# Patient Record
Sex: MALE | Race: WHITE | NOT HISPANIC OR LATINO | Employment: UNEMPLOYED | URBAN - METROPOLITAN AREA
[De-identification: names, ages, dates, MRNs, and addresses within clinical notes are randomized per-mention and may not be internally consistent; named-entity substitution may affect disease eponyms.]

---

## 2023-01-01 ENCOUNTER — APPOINTMENT (OUTPATIENT)
Dept: CT IMAGING | Facility: HOSPITAL | Age: 63
DRG: 872 | End: 2023-01-01
Payer: COMMERCIAL

## 2023-01-01 ENCOUNTER — HOSPITAL ENCOUNTER (INPATIENT)
Facility: HOSPITAL | Age: 63
LOS: 1 days | DRG: 872 | End: 2023-10-26
Attending: EMERGENCY MEDICINE | Admitting: STUDENT IN AN ORGANIZED HEALTH CARE EDUCATION/TRAINING PROGRAM
Payer: COMMERCIAL

## 2023-01-01 VITALS
TEMPERATURE: 98.4 F | HEART RATE: 132 BPM | WEIGHT: 159.83 LBS | BODY MASS INDEX: 22.88 KG/M2 | RESPIRATION RATE: 16 BRPM | OXYGEN SATURATION: 99 % | HEIGHT: 70 IN | SYSTOLIC BLOOD PRESSURE: 86 MMHG | DIASTOLIC BLOOD PRESSURE: 50 MMHG

## 2023-01-01 DIAGNOSIS — R11.2 NAUSEA AND VOMITING, UNSPECIFIED VOMITING TYPE: ICD-10-CM

## 2023-01-01 DIAGNOSIS — A41.9 SEPSIS WITH ACUTE RENAL FAILURE WITHOUT SEPTIC SHOCK, DUE TO UNSPECIFIED ORGANISM, UNSPECIFIED ACUTE RENAL FAILURE TYPE: Primary | ICD-10-CM

## 2023-01-01 DIAGNOSIS — R65.20 SEPSIS WITH ACUTE RENAL FAILURE WITHOUT SEPTIC SHOCK, DUE TO UNSPECIFIED ORGANISM, UNSPECIFIED ACUTE RENAL FAILURE TYPE: Primary | ICD-10-CM

## 2023-01-01 DIAGNOSIS — K80.81 BILIARY CALCULUS OF OTHER SITE WITH OBSTRUCTION: ICD-10-CM

## 2023-01-01 DIAGNOSIS — E87.5 HYPERKALEMIA: ICD-10-CM

## 2023-01-01 DIAGNOSIS — N17.9 SEPSIS WITH ACUTE RENAL FAILURE WITHOUT SEPTIC SHOCK, DUE TO UNSPECIFIED ORGANISM, UNSPECIFIED ACUTE RENAL FAILURE TYPE: Primary | ICD-10-CM

## 2023-01-01 LAB
ADV 40+41 DNA STL QL NAA+NON-PROBE: NOT DETECTED
ALBUMIN SERPL-MCNC: 3 G/DL (ref 3.5–5.2)
ALBUMIN/GLOB SERPL: 0.9 G/DL
ALP SERPL-CCNC: 1012 U/L (ref 39–117)
ALT SERPL W P-5'-P-CCNC: 66 U/L (ref 1–41)
ANION GAP SERPL CALCULATED.3IONS-SCNC: 33.8 MMOL/L (ref 5–15)
AST SERPL-CCNC: 166 U/L (ref 1–40)
ASTRO TYP 1-8 RNA STL QL NAA+NON-PROBE: NOT DETECTED
B PARAPERT DNA SPEC QL NAA+PROBE: NOT DETECTED
B PERT DNA SPEC QL NAA+PROBE: NOT DETECTED
BASOPHILS # BLD AUTO: 0.15 10*3/MM3 (ref 0–0.2)
BASOPHILS NFR BLD AUTO: 0.6 % (ref 0–1.5)
BILIRUB SERPL-MCNC: 2.1 MG/DL (ref 0–1.2)
BUN SERPL-MCNC: 56 MG/DL (ref 8–23)
BUN/CREAT SERPL: 10.2 (ref 7–25)
C CAYETANENSIS DNA STL QL NAA+NON-PROBE: NOT DETECTED
C COLI+JEJ+UPSA DNA STL QL NAA+NON-PROBE: NOT DETECTED
C PNEUM DNA NPH QL NAA+NON-PROBE: NOT DETECTED
CALCIUM SPEC-SCNC: 9.8 MG/DL (ref 8.6–10.5)
CHLORIDE SERPL-SCNC: 84 MMOL/L (ref 98–107)
CO2 SERPL-SCNC: 16.2 MMOL/L (ref 22–29)
CREAT SERPL-MCNC: 5.51 MG/DL (ref 0.76–1.27)
CRYPTOSP DNA STL QL NAA+NON-PROBE: NOT DETECTED
D-LACTATE SERPL-SCNC: 10.8 MMOL/L (ref 0.5–2)
D-LACTATE SERPL-SCNC: 2.7 MMOL/L (ref 0.5–2)
D-LACTATE SERPL-SCNC: 3.3 MMOL/L (ref 0.5–2)
DEPRECATED RDW RBC AUTO: 51.8 FL (ref 37–54)
DEPRECATED RDW RBC AUTO: 52.5 FL (ref 37–54)
E HISTOLYT DNA STL QL NAA+NON-PROBE: NOT DETECTED
EAEC PAA PLAS AGGR+AATA ST NAA+NON-PRB: NOT DETECTED
EC STX1+STX2 GENES STL QL NAA+NON-PROBE: NOT DETECTED
EGFRCR SERPLBLD CKD-EPI 2021: 11 ML/MIN/1.73
EOSINOPHIL # BLD AUTO: 0.07 10*3/MM3 (ref 0–0.4)
EOSINOPHIL NFR BLD AUTO: 0.3 % (ref 0.3–6.2)
EPEC EAE GENE STL QL NAA+NON-PROBE: NOT DETECTED
ERYTHROCYTE [DISTWIDTH] IN BLOOD BY AUTOMATED COUNT: 16.6 % (ref 12.3–15.4)
ERYTHROCYTE [DISTWIDTH] IN BLOOD BY AUTOMATED COUNT: 17.1 % (ref 12.3–15.4)
ETEC LTA+ST1A+ST1B TOX ST NAA+NON-PROBE: NOT DETECTED
FLUAV SUBTYP SPEC NAA+PROBE: NOT DETECTED
FLUBV RNA ISLT QL NAA+PROBE: NOT DETECTED
G LAMBLIA DNA STL QL NAA+NON-PROBE: NOT DETECTED
GLOBULIN UR ELPH-MCNC: 3.5 GM/DL
GLUCOSE BLDC GLUCOMTR-MCNC: 102 MG/DL (ref 70–130)
GLUCOSE SERPL-MCNC: 101 MG/DL (ref 65–99)
HADV DNA SPEC NAA+PROBE: NOT DETECTED
HCOV 229E RNA SPEC QL NAA+PROBE: NOT DETECTED
HCOV HKU1 RNA SPEC QL NAA+PROBE: NOT DETECTED
HCOV NL63 RNA SPEC QL NAA+PROBE: NOT DETECTED
HCOV OC43 RNA SPEC QL NAA+PROBE: NOT DETECTED
HCT VFR BLD AUTO: 32.6 % (ref 37.5–51)
HCT VFR BLD AUTO: 35.1 % (ref 37.5–51)
HGB BLD-MCNC: 10.2 G/DL (ref 13–17.7)
HGB BLD-MCNC: 10.8 G/DL (ref 13–17.7)
HMPV RNA NPH QL NAA+NON-PROBE: NOT DETECTED
HPIV1 RNA ISLT QL NAA+PROBE: NOT DETECTED
HPIV2 RNA SPEC QL NAA+PROBE: NOT DETECTED
HPIV3 RNA NPH QL NAA+PROBE: NOT DETECTED
HPIV4 P GENE NPH QL NAA+PROBE: NOT DETECTED
IMM GRANULOCYTES # BLD AUTO: 0.97 10*3/MM3 (ref 0–0.05)
IMM GRANULOCYTES NFR BLD AUTO: 4.1 % (ref 0–0.5)
LIPASE SERPL-CCNC: 50 U/L (ref 13–60)
LYMPHOCYTES # BLD AUTO: 2.99 10*3/MM3 (ref 0.7–3.1)
LYMPHOCYTES NFR BLD AUTO: 12.6 % (ref 19.6–45.3)
M PNEUMO IGG SER IA-ACNC: NOT DETECTED
MCH RBC QN AUTO: 26.5 PG (ref 26.6–33)
MCH RBC QN AUTO: 27.3 PG (ref 26.6–33)
MCHC RBC AUTO-ENTMCNC: 30.8 G/DL (ref 31.5–35.7)
MCHC RBC AUTO-ENTMCNC: 31.3 G/DL (ref 31.5–35.7)
MCV RBC AUTO: 86 FL (ref 79–97)
MCV RBC AUTO: 87.2 FL (ref 79–97)
MONOCYTES # BLD AUTO: 1.01 10*3/MM3 (ref 0.1–0.9)
MONOCYTES NFR BLD AUTO: 4.3 % (ref 5–12)
NEUTROPHILS NFR BLD AUTO: 18.51 10*3/MM3 (ref 1.7–7)
NEUTROPHILS NFR BLD AUTO: 78.1 % (ref 42.7–76)
NOROVIRUS GI+II RNA STL QL NAA+NON-PROBE: DETECTED
NRBC BLD AUTO-RTO: 0.6 /100 WBC (ref 0–0.2)
P SHIGELLOIDES DNA STL QL NAA+NON-PROBE: NOT DETECTED
PLATELET # BLD AUTO: 361 10*3/MM3 (ref 140–450)
PLATELET # BLD AUTO: 518 10*3/MM3 (ref 140–450)
PMV BLD AUTO: 10.9 FL (ref 6–12)
PMV BLD AUTO: 9.9 FL (ref 6–12)
POTASSIUM SERPL-SCNC: 5.5 MMOL/L (ref 3.5–5.2)
PROT SERPL-MCNC: 6.5 G/DL (ref 6–8.5)
RBC # BLD AUTO: 3.74 10*6/MM3 (ref 4.14–5.8)
RBC # BLD AUTO: 4.08 10*6/MM3 (ref 4.14–5.8)
RHINOVIRUS RNA SPEC NAA+PROBE: NOT DETECTED
RSV RNA NPH QL NAA+NON-PROBE: NOT DETECTED
RVA RNA STL QL NAA+NON-PROBE: NOT DETECTED
S ENT+BONG DNA STL QL NAA+NON-PROBE: NOT DETECTED
SAPO I+II+IV+V RNA STL QL NAA+NON-PROBE: NOT DETECTED
SARS-COV-2 RNA NPH QL NAA+NON-PROBE: NOT DETECTED
SHIGELLA SP+EIEC IPAH ST NAA+NON-PROBE: NOT DETECTED
SODIUM SERPL-SCNC: 134 MMOL/L (ref 136–145)
V CHOL+PARA+VUL DNA STL QL NAA+NON-PROBE: NOT DETECTED
V CHOLERAE DNA STL QL NAA+NON-PROBE: NOT DETECTED
WBC NRBC COR # BLD: 23.7 10*3/MM3 (ref 3.4–10.8)
WBC NRBC COR # BLD: 24.62 10*3/MM3 (ref 3.4–10.8)
Y ENTEROCOL DNA STL QL NAA+NON-PROBE: NOT DETECTED

## 2023-01-01 PROCEDURE — 25810000003 SODIUM CHLORIDE 0.9 % SOLUTION: Performed by: NURSE PRACTITIONER

## 2023-01-01 PROCEDURE — 83605 ASSAY OF LACTIC ACID: CPT | Performed by: EMERGENCY MEDICINE

## 2023-01-01 PROCEDURE — 94799 UNLISTED PULMONARY SVC/PX: CPT

## 2023-01-01 PROCEDURE — 0202U NFCT DS 22 TRGT SARS-COV-2: CPT | Performed by: EMERGENCY MEDICINE

## 2023-01-01 PROCEDURE — 99285 EMERGENCY DEPT VISIT HI MDM: CPT

## 2023-01-01 PROCEDURE — 25810000003 LACTATED RINGERS PER 1000 ML: Performed by: EMERGENCY MEDICINE

## 2023-01-01 PROCEDURE — 87507 IADNA-DNA/RNA PROBE TQ 12-25: CPT | Performed by: EMERGENCY MEDICINE

## 2023-01-01 PROCEDURE — 5A12012 PERFORMANCE OF CARDIAC OUTPUT, SINGLE, MANUAL: ICD-10-PCS | Performed by: EMERGENCY MEDICINE

## 2023-01-01 PROCEDURE — 87040 BLOOD CULTURE FOR BACTERIA: CPT | Performed by: EMERGENCY MEDICINE

## 2023-01-01 PROCEDURE — 85025 COMPLETE CBC W/AUTO DIFF WBC: CPT | Performed by: EMERGENCY MEDICINE

## 2023-01-01 PROCEDURE — 92950 HEART/LUNG RESUSCITATION CPR: CPT

## 2023-01-01 PROCEDURE — 83690 ASSAY OF LIPASE: CPT | Performed by: EMERGENCY MEDICINE

## 2023-01-01 PROCEDURE — 25010000002 EPINEPHRINE 1 MG/10ML SOLUTION PREFILLED SYRINGE: Performed by: EMERGENCY MEDICINE

## 2023-01-01 PROCEDURE — 85027 COMPLETE CBC AUTOMATED: CPT | Performed by: NURSE PRACTITIONER

## 2023-01-01 PROCEDURE — 25010000002 PIPERACILLIN SOD-TAZOBACTAM PER 1 G: Performed by: EMERGENCY MEDICINE

## 2023-01-01 PROCEDURE — 36415 COLL VENOUS BLD VENIPUNCTURE: CPT | Performed by: EMERGENCY MEDICINE

## 2023-01-01 PROCEDURE — 82948 REAGENT STRIP/BLOOD GLUCOSE: CPT

## 2023-01-01 PROCEDURE — 0BH17EZ INSERTION OF ENDOTRACHEAL AIRWAY INTO TRACHEA, VIA NATURAL OR ARTIFICIAL OPENING: ICD-10-PCS | Performed by: EMERGENCY MEDICINE

## 2023-01-01 PROCEDURE — 74176 CT ABD & PELVIS W/O CONTRAST: CPT

## 2023-01-01 PROCEDURE — 25010000002 ONDANSETRON PER 1 MG: Performed by: EMERGENCY MEDICINE

## 2023-01-01 PROCEDURE — 80053 COMPREHEN METABOLIC PANEL: CPT | Performed by: EMERGENCY MEDICINE

## 2023-01-01 PROCEDURE — 25010000002 AMIODARONE PER 30 MG: Performed by: EMERGENCY MEDICINE

## 2023-01-01 RX ORDER — SODIUM CHLORIDE 0.9 % (FLUSH) 0.9 %
10 SYRINGE (ML) INJECTION AS NEEDED
Status: DISCONTINUED | OUTPATIENT
Start: 2023-01-01 | End: 2023-01-01 | Stop reason: HOSPADM

## 2023-01-01 RX ORDER — SODIUM CHLORIDE 0.9 % (FLUSH) 0.9 %
10 SYRINGE (ML) INJECTION EVERY 12 HOURS SCHEDULED
Status: DISCONTINUED | OUTPATIENT
Start: 2023-01-01 | End: 2023-01-01 | Stop reason: HOSPADM

## 2023-01-01 RX ORDER — SODIUM CHLORIDE 9 MG/ML
40 INJECTION, SOLUTION INTRAVENOUS AS NEEDED
Status: DISCONTINUED | OUTPATIENT
Start: 2023-01-01 | End: 2023-01-01 | Stop reason: HOSPADM

## 2023-01-01 RX ORDER — IBUPROFEN 600 MG/1
1 TABLET ORAL
Status: DISCONTINUED | OUTPATIENT
Start: 2023-01-01 | End: 2023-01-01 | Stop reason: HOSPADM

## 2023-01-01 RX ORDER — ONDANSETRON 4 MG/1
4 TABLET, FILM COATED ORAL EVERY 6 HOURS PRN
Status: DISCONTINUED | OUTPATIENT
Start: 2023-01-01 | End: 2023-01-01 | Stop reason: HOSPADM

## 2023-01-01 RX ORDER — ATORVASTATIN CALCIUM 20 MG/1
20 TABLET, FILM COATED ORAL NIGHTLY
COMMUNITY

## 2023-01-01 RX ORDER — ONDANSETRON 2 MG/ML
4 INJECTION INTRAMUSCULAR; INTRAVENOUS EVERY 6 HOURS PRN
Status: DISCONTINUED | OUTPATIENT
Start: 2023-01-01 | End: 2023-01-01 | Stop reason: HOSPADM

## 2023-01-01 RX ORDER — MIRTAZAPINE 15 MG/1
7.5 TABLET, FILM COATED ORAL NIGHTLY
COMMUNITY

## 2023-01-01 RX ORDER — NICOTINE POLACRILEX 4 MG
15 LOZENGE BUCCAL
Status: DISCONTINUED | OUTPATIENT
Start: 2023-01-01 | End: 2023-01-01 | Stop reason: HOSPADM

## 2023-01-01 RX ORDER — CALCIUM CARBONATE 500 MG/1
2 TABLET, CHEWABLE ORAL 2 TIMES DAILY PRN
Status: DISCONTINUED | OUTPATIENT
Start: 2023-01-01 | End: 2023-01-01 | Stop reason: HOSPADM

## 2023-01-01 RX ORDER — DEXTROAMPHETAMINE SACCHARATE, AMPHETAMINE ASPARTATE, DEXTROAMPHETAMINE SULFATE AND AMPHETAMINE SULFATE 1.25; 1.25; 1.25; 1.25 MG/1; MG/1; MG/1; MG/1
5 TABLET ORAL 3 TIMES DAILY
COMMUNITY

## 2023-01-01 RX ORDER — SODIUM CHLORIDE 9 MG/ML
100 INJECTION, SOLUTION INTRAVENOUS CONTINUOUS
Status: DISCONTINUED | OUTPATIENT
Start: 2023-01-01 | End: 2023-01-01 | Stop reason: HOSPADM

## 2023-01-01 RX ORDER — ONDANSETRON 2 MG/ML
4 INJECTION INTRAMUSCULAR; INTRAVENOUS ONCE
Status: COMPLETED | OUTPATIENT
Start: 2023-01-01 | End: 2023-01-01

## 2023-01-01 RX ORDER — FOLIC ACID 1 MG/1
1 TABLET ORAL DAILY
COMMUNITY

## 2023-01-01 RX ORDER — PREDNISONE 10 MG/1
10 TABLET ORAL DAILY
COMMUNITY

## 2023-01-01 RX ORDER — OMEPRAZOLE 20 MG/1
20 CAPSULE, DELAYED RELEASE ORAL DAILY
COMMUNITY

## 2023-01-01 RX ORDER — DEXTROSE MONOHYDRATE 25 G/50ML
25 INJECTION, SOLUTION INTRAVENOUS
Status: DISCONTINUED | OUTPATIENT
Start: 2023-01-01 | End: 2023-01-01 | Stop reason: HOSPADM

## 2023-01-01 RX ORDER — ACETAMINOPHEN 325 MG/1
325 TABLET ORAL EVERY 6 HOURS PRN
COMMUNITY

## 2023-01-01 RX ORDER — VENLAFAXINE HYDROCHLORIDE 75 MG/1
75 CAPSULE, EXTENDED RELEASE ORAL DAILY
COMMUNITY

## 2023-01-01 RX ORDER — CHOLECALCIFEROL (VITAMIN D3) 125 MCG
5 CAPSULE ORAL NIGHTLY
COMMUNITY

## 2023-01-01 RX ORDER — ONDANSETRON HYDROCHLORIDE 8 MG/1
8 TABLET, FILM COATED ORAL EVERY 8 HOURS PRN
COMMUNITY

## 2023-01-01 RX ORDER — CLONAZEPAM 0.5 MG/1
0.5 TABLET ORAL 2 TIMES DAILY PRN
COMMUNITY

## 2023-01-01 RX ORDER — SODIUM CHLORIDE, SODIUM LACTATE, POTASSIUM CHLORIDE, CALCIUM CHLORIDE 600; 310; 30; 20 MG/100ML; MG/100ML; MG/100ML; MG/100ML
125 INJECTION, SOLUTION INTRAVENOUS CONTINUOUS
Status: DISCONTINUED | OUTPATIENT
Start: 2023-01-01 | End: 2023-01-01 | Stop reason: HOSPADM

## 2023-01-01 RX ORDER — CALCIUM CARBONATE 500 MG/1
2 TABLET, CHEWABLE ORAL EVERY 6 HOURS PRN
COMMUNITY

## 2023-01-01 RX ORDER — ASPIRIN 81 MG/1
81 TABLET ORAL DAILY
COMMUNITY

## 2023-01-01 RX ORDER — BISACODYL 5 MG/1
10 TABLET, DELAYED RELEASE ORAL DAILY PRN
COMMUNITY

## 2023-01-01 RX ORDER — AMIODARONE HYDROCHLORIDE 50 MG/ML
INJECTION, SOLUTION INTRAVENOUS
Status: COMPLETED | OUTPATIENT
Start: 2023-01-01 | End: 2023-01-01

## 2023-01-01 RX ORDER — NITROGLYCERIN 0.4 MG/1
0.4 TABLET SUBLINGUAL
Status: DISCONTINUED | OUTPATIENT
Start: 2023-01-01 | End: 2023-01-01 | Stop reason: HOSPADM

## 2023-01-01 RX ORDER — INSULIN LISPRO 100 [IU]/ML
2-7 INJECTION, SOLUTION INTRAVENOUS; SUBCUTANEOUS
Status: DISCONTINUED | OUTPATIENT
Start: 2023-01-01 | End: 2023-01-01 | Stop reason: HOSPADM

## 2023-01-01 RX ORDER — TORSEMIDE 20 MG/1
20 TABLET ORAL DAILY
COMMUNITY

## 2023-01-01 RX ADMIN — ONDANSETRON 4 MG: 2 INJECTION INTRAMUSCULAR; INTRAVENOUS at 21:47

## 2023-01-01 RX ADMIN — PIPERACILLIN SODIUM AND TAZOBACTAM SODIUM 3.38 G: 3; .375 INJECTION, SOLUTION INTRAVENOUS at 00:35

## 2023-01-01 RX ADMIN — EPINEPHRINE 1 MG: 0.1 INJECTION INTRACARDIAC; INTRAVENOUS at 06:52

## 2023-01-01 RX ADMIN — SODIUM BICARBONATE 50 MEQ: 84 INJECTION, SOLUTION INTRAVENOUS at 06:58

## 2023-01-01 RX ADMIN — AMIODARONE HYDROCHLORIDE 150 MG: 50 INJECTION, SOLUTION INTRAVENOUS at 07:06

## 2023-01-01 RX ADMIN — EPINEPHRINE 1 MG: 0.1 INJECTION INTRACARDIAC; INTRAVENOUS at 07:09

## 2023-01-01 RX ADMIN — EPINEPHRINE 1 MG: 0.1 INJECTION INTRACARDIAC; INTRAVENOUS at 07:00

## 2023-01-01 RX ADMIN — SODIUM CHLORIDE 100 ML/HR: 9 INJECTION, SOLUTION INTRAVENOUS at 04:32

## 2023-01-01 RX ADMIN — Medication 10 ML: at 02:15

## 2023-01-01 RX ADMIN — SODIUM CHLORIDE, POTASSIUM CHLORIDE, SODIUM LACTATE AND CALCIUM CHLORIDE 125 ML/HR: 600; 310; 30; 20 INJECTION, SOLUTION INTRAVENOUS at 21:44

## 2023-01-01 RX ADMIN — EPINEPHRINE 1 MG: 0.1 INJECTION INTRACARDIAC; INTRAVENOUS at 00:52

## 2023-01-01 RX ADMIN — EPINEPHRINE 1 MG: 0.1 INJECTION INTRACARDIAC; INTRAVENOUS at 06:55

## 2023-01-01 RX ADMIN — EPINEPHRINE 1 MG: 0.1 INJECTION INTRACARDIAC; INTRAVENOUS at 07:12

## 2023-10-11 ENCOUNTER — HOSPITAL ENCOUNTER (INPATIENT)
Facility: HOSPITAL | Age: 63
LOS: 9 days | Discharge: NURSING, REHAB OR LONG-TERM CARE | End: 2023-10-20
Attending: STUDENT IN AN ORGANIZED HEALTH CARE EDUCATION/TRAINING PROGRAM | Admitting: INTERNAL MEDICINE
Payer: COMMERCIAL

## 2023-10-11 ENCOUNTER — APPOINTMENT (OUTPATIENT)
Dept: GENERAL RADIOLOGY | Facility: HOSPITAL | Age: 63
End: 2023-10-11
Payer: COMMERCIAL

## 2023-10-11 ENCOUNTER — APPOINTMENT (OUTPATIENT)
Dept: CT IMAGING | Facility: HOSPITAL | Age: 63
End: 2023-10-11
Payer: COMMERCIAL

## 2023-10-11 DIAGNOSIS — R65.21 SEPTIC SHOCK: Primary | ICD-10-CM

## 2023-10-11 DIAGNOSIS — I95.9 HYPOTENSION, UNSPECIFIED HYPOTENSION TYPE: ICD-10-CM

## 2023-10-11 DIAGNOSIS — A41.9 SEPTIC SHOCK: Primary | ICD-10-CM

## 2023-10-11 LAB
ALBUMIN SERPL-MCNC: 2.8 G/DL (ref 3.5–5.2)
ALBUMIN/GLOB SERPL: 1 G/DL
ALP SERPL-CCNC: 547 U/L (ref 39–117)
ALT SERPL W P-5'-P-CCNC: 25 U/L (ref 1–41)
ANION GAP SERPL CALCULATED.3IONS-SCNC: 12.5 MMOL/L (ref 5–15)
AST SERPL-CCNC: 146 U/L (ref 1–40)
B PARAPERT DNA SPEC QL NAA+PROBE: NOT DETECTED
B PERT DNA SPEC QL NAA+PROBE: NOT DETECTED
BACTERIA UR QL AUTO: ABNORMAL /HPF
BASOPHILS # BLD AUTO: 0.07 10*3/MM3 (ref 0–0.2)
BASOPHILS NFR BLD AUTO: 0.4 % (ref 0–1.5)
BILIRUB SERPL-MCNC: 0.9 MG/DL (ref 0–1.2)
BILIRUB UR QL STRIP: NEGATIVE
BUN SERPL-MCNC: 10 MG/DL (ref 8–23)
BUN/CREAT SERPL: 4.3 (ref 7–25)
C PNEUM DNA NPH QL NAA+NON-PROBE: NOT DETECTED
CALCIUM SPEC-SCNC: 8.6 MG/DL (ref 8.6–10.5)
CHLORIDE SERPL-SCNC: 95 MMOL/L (ref 98–107)
CLARITY UR: CLEAR
CO2 SERPL-SCNC: 27.5 MMOL/L (ref 22–29)
COLOR UR: YELLOW
CREAT SERPL-MCNC: 2.3 MG/DL (ref 0.76–1.27)
D-LACTATE SERPL-SCNC: 1.8 MMOL/L (ref 0.5–2)
DEPRECATED RDW RBC AUTO: 47.5 FL (ref 37–54)
EGFRCR SERPLBLD CKD-EPI 2021: 31.3 ML/MIN/1.73
EOSINOPHIL # BLD AUTO: 0.12 10*3/MM3 (ref 0–0.4)
EOSINOPHIL NFR BLD AUTO: 0.6 % (ref 0.3–6.2)
ERYTHROCYTE [DISTWIDTH] IN BLOOD BY AUTOMATED COUNT: 15 % (ref 12.3–15.4)
FLUAV SUBTYP SPEC NAA+PROBE: NOT DETECTED
FLUBV RNA ISLT QL NAA+PROBE: NOT DETECTED
GLOBULIN UR ELPH-MCNC: 2.7 GM/DL
GLUCOSE SERPL-MCNC: 94 MG/DL (ref 65–99)
GLUCOSE UR STRIP-MCNC: NEGATIVE MG/DL
HADV DNA SPEC NAA+PROBE: NOT DETECTED
HCOV 229E RNA SPEC QL NAA+PROBE: NOT DETECTED
HCOV HKU1 RNA SPEC QL NAA+PROBE: NOT DETECTED
HCOV NL63 RNA SPEC QL NAA+PROBE: NOT DETECTED
HCOV OC43 RNA SPEC QL NAA+PROBE: NOT DETECTED
HCT VFR BLD AUTO: 26.2 % (ref 37.5–51)
HGB BLD-MCNC: 8.3 G/DL (ref 13–17.7)
HGB UR QL STRIP.AUTO: NEGATIVE
HMPV RNA NPH QL NAA+NON-PROBE: NOT DETECTED
HPIV1 RNA ISLT QL NAA+PROBE: NOT DETECTED
HPIV2 RNA SPEC QL NAA+PROBE: NOT DETECTED
HPIV3 RNA NPH QL NAA+PROBE: NOT DETECTED
HPIV4 P GENE NPH QL NAA+PROBE: NOT DETECTED
HYALINE CASTS UR QL AUTO: ABNORMAL /LPF
IMM GRANULOCYTES # BLD AUTO: 0.77 10*3/MM3 (ref 0–0.05)
IMM GRANULOCYTES NFR BLD AUTO: 4.1 % (ref 0–0.5)
KETONES UR QL STRIP: NEGATIVE
LEUKOCYTE ESTERASE UR QL STRIP.AUTO: NEGATIVE
LYMPHOCYTES # BLD AUTO: 2.5 10*3/MM3 (ref 0.7–3.1)
LYMPHOCYTES NFR BLD AUTO: 13.4 % (ref 19.6–45.3)
M PNEUMO IGG SER IA-ACNC: NOT DETECTED
MAGNESIUM SERPL-MCNC: 1.3 MG/DL (ref 1.6–2.4)
MCH RBC QN AUTO: 27.2 PG (ref 26.6–33)
MCHC RBC AUTO-ENTMCNC: 31.7 G/DL (ref 31.5–35.7)
MCV RBC AUTO: 85.9 FL (ref 79–97)
MONOCYTES # BLD AUTO: 0.9 10*3/MM3 (ref 0.1–0.9)
MONOCYTES NFR BLD AUTO: 4.8 % (ref 5–12)
NEUTROPHILS NFR BLD AUTO: 14.33 10*3/MM3 (ref 1.7–7)
NEUTROPHILS NFR BLD AUTO: 76.7 % (ref 42.7–76)
NITRITE UR QL STRIP: NEGATIVE
NRBC BLD AUTO-RTO: 0.3 /100 WBC (ref 0–0.2)
PH UR STRIP.AUTO: 7 [PH] (ref 5–8)
PLATELET # BLD AUTO: 379 10*3/MM3 (ref 140–450)
PMV BLD AUTO: 9.8 FL (ref 6–12)
POTASSIUM SERPL-SCNC: 3.1 MMOL/L (ref 3.5–5.2)
PROCALCITONIN SERPL-MCNC: 10.6 NG/ML (ref 0–0.25)
PROT SERPL-MCNC: 5.5 G/DL (ref 6–8.5)
PROT UR QL STRIP: ABNORMAL
RBC # BLD AUTO: 3.05 10*6/MM3 (ref 4.14–5.8)
RBC # UR STRIP: ABNORMAL /HPF
REF LAB TEST METHOD: ABNORMAL
RHINOVIRUS RNA SPEC NAA+PROBE: NOT DETECTED
RSV RNA NPH QL NAA+NON-PROBE: NOT DETECTED
SARS-COV-2 RNA NPH QL NAA+NON-PROBE: NOT DETECTED
SODIUM SERPL-SCNC: 135 MMOL/L (ref 136–145)
SP GR UR STRIP: 1.01 (ref 1–1.03)
SQUAMOUS #/AREA URNS HPF: ABNORMAL /HPF
TROPONIN T SERPL HS-MCNC: 678 NG/L
UROBILINOGEN UR QL STRIP: ABNORMAL
WBC # UR STRIP: ABNORMAL /HPF
WBC NRBC COR # BLD: 18.69 10*3/MM3 (ref 3.4–10.8)

## 2023-10-11 PROCEDURE — 93010 ELECTROCARDIOGRAM REPORT: CPT | Performed by: INTERNAL MEDICINE

## 2023-10-11 PROCEDURE — 70450 CT HEAD/BRAIN W/O DYE: CPT

## 2023-10-11 PROCEDURE — 71045 X-RAY EXAM CHEST 1 VIEW: CPT

## 2023-10-11 PROCEDURE — 83605 ASSAY OF LACTIC ACID: CPT | Performed by: PHYSICIAN ASSISTANT

## 2023-10-11 PROCEDURE — 25010000002 VANCOMYCIN 10 G RECONSTITUTED SOLUTION: Performed by: PHYSICIAN ASSISTANT

## 2023-10-11 PROCEDURE — 85025 COMPLETE CBC W/AUTO DIFF WBC: CPT | Performed by: PHYSICIAN ASSISTANT

## 2023-10-11 PROCEDURE — 81001 URINALYSIS AUTO W/SCOPE: CPT | Performed by: PHYSICIAN ASSISTANT

## 2023-10-11 PROCEDURE — 99285 EMERGENCY DEPT VISIT HI MDM: CPT

## 2023-10-11 PROCEDURE — 84145 PROCALCITONIN (PCT): CPT | Performed by: PHYSICIAN ASSISTANT

## 2023-10-11 PROCEDURE — 25010000002 CEFEPIME PER 500 MG: Performed by: PHYSICIAN ASSISTANT

## 2023-10-11 PROCEDURE — 80053 COMPREHEN METABOLIC PANEL: CPT | Performed by: PHYSICIAN ASSISTANT

## 2023-10-11 PROCEDURE — 25010000002 POTASSIUM CHLORIDE 10 MEQ/100ML SOLUTION: Performed by: PHYSICIAN ASSISTANT

## 2023-10-11 PROCEDURE — 87086 URINE CULTURE/COLONY COUNT: CPT | Performed by: INTERNAL MEDICINE

## 2023-10-11 PROCEDURE — 93005 ELECTROCARDIOGRAM TRACING: CPT | Performed by: PHYSICIAN ASSISTANT

## 2023-10-11 PROCEDURE — 87040 BLOOD CULTURE FOR BACTERIA: CPT | Performed by: PHYSICIAN ASSISTANT

## 2023-10-11 PROCEDURE — 84484 ASSAY OF TROPONIN QUANT: CPT | Performed by: PHYSICIAN ASSISTANT

## 2023-10-11 PROCEDURE — 0202U NFCT DS 22 TRGT SARS-COV-2: CPT | Performed by: PHYSICIAN ASSISTANT

## 2023-10-11 PROCEDURE — 25810000003 SODIUM CHLORIDE 0.9 % SOLUTION: Performed by: PHYSICIAN ASSISTANT

## 2023-10-11 PROCEDURE — 83735 ASSAY OF MAGNESIUM: CPT | Performed by: PHYSICIAN ASSISTANT

## 2023-10-11 PROCEDURE — P9612 CATHETERIZE FOR URINE SPEC: HCPCS

## 2023-10-11 PROCEDURE — 36415 COLL VENOUS BLD VENIPUNCTURE: CPT

## 2023-10-11 RX ORDER — POTASSIUM CHLORIDE 7.45 MG/ML
10 INJECTION INTRAVENOUS ONCE
Status: COMPLETED | OUTPATIENT
Start: 2023-10-11 | End: 2023-10-12

## 2023-10-11 RX ORDER — SODIUM CHLORIDE 0.9 % (FLUSH) 0.9 %
10 SYRINGE (ML) INJECTION AS NEEDED
Status: DISCONTINUED | OUTPATIENT
Start: 2023-10-11 | End: 2023-10-20 | Stop reason: HOSPADM

## 2023-10-11 RX ORDER — NOREPINEPHRINE BITARTRATE 0.03 MG/ML
.02-.3 INJECTION, SOLUTION INTRAVENOUS
Status: DISCONTINUED | OUTPATIENT
Start: 2023-10-11 | End: 2023-10-13

## 2023-10-11 RX ADMIN — CEFEPIME 2000 MG: 2 INJECTION, POWDER, FOR SOLUTION INTRAVENOUS at 22:32

## 2023-10-11 RX ADMIN — POTASSIUM CHLORIDE 10 MEQ: 7.46 INJECTION, SOLUTION INTRAVENOUS at 23:39

## 2023-10-11 RX ADMIN — Medication 0.06 MCG/KG/MIN: at 23:30

## 2023-10-11 RX ADMIN — VANCOMYCIN HYDROCHLORIDE 1750 MG: 10 INJECTION, POWDER, LYOPHILIZED, FOR SOLUTION INTRAVENOUS at 23:18

## 2023-10-11 RX ADMIN — Medication 0.02 MCG/KG/MIN: at 22:58

## 2023-10-11 RX ADMIN — Medication 0.06 MCG/KG/MIN: at 23:17

## 2023-10-12 ENCOUNTER — APPOINTMENT (OUTPATIENT)
Dept: ULTRASOUND IMAGING | Facility: HOSPITAL | Age: 63
End: 2023-10-12
Payer: COMMERCIAL

## 2023-10-12 ENCOUNTER — APPOINTMENT (OUTPATIENT)
Dept: CARDIOLOGY | Facility: HOSPITAL | Age: 63
End: 2023-10-12
Payer: COMMERCIAL

## 2023-10-12 LAB
ALBUMIN SERPL-MCNC: 2.7 G/DL (ref 3.5–5.2)
ALBUMIN/GLOB SERPL: 1 G/DL
ALP SERPL-CCNC: 528 U/L (ref 39–117)
ALT SERPL W P-5'-P-CCNC: 36 U/L (ref 1–41)
AMMONIA BLD-SCNC: 21 UMOL/L (ref 16–60)
ANION GAP SERPL CALCULATED.3IONS-SCNC: 12 MMOL/L (ref 5–15)
AORTIC DIMENSIONLESS INDEX: 1 (DI)
AST SERPL-CCNC: 235 U/L (ref 1–40)
BH CV ECHO MEAS - ACS: 2.07 CM
BH CV ECHO MEAS - AO MAX PG: 8.4 MMHG
BH CV ECHO MEAS - AO MEAN PG: 4 MMHG
BH CV ECHO MEAS - AO ROOT DIAM: 3.9 CM
BH CV ECHO MEAS - AO V2 MAX: 145 CM/SEC
BH CV ECHO MEAS - AO V2 VTI: 26.5 CM
BH CV ECHO MEAS - AVA(I,D): 3.8 CM2
BH CV ECHO MEAS - EDV(CUBED): 79.5 ML
BH CV ECHO MEAS - EDV(MOD-SP2): 116 ML
BH CV ECHO MEAS - EDV(MOD-SP4): 80 ML
BH CV ECHO MEAS - EF(MOD-BP): 62.7 %
BH CV ECHO MEAS - EF(MOD-SP2): 65.5 %
BH CV ECHO MEAS - EF(MOD-SP4): 58.8 %
BH CV ECHO MEAS - ESV(CUBED): 31 ML
BH CV ECHO MEAS - ESV(MOD-SP2): 40 ML
BH CV ECHO MEAS - ESV(MOD-SP4): 33 ML
BH CV ECHO MEAS - FS: 27 %
BH CV ECHO MEAS - IVS/LVPW: 0.92 CM
BH CV ECHO MEAS - IVSD: 1.1 CM
BH CV ECHO MEAS - LAT PEAK E' VEL: 11.9 CM/SEC
BH CV ECHO MEAS - LV MASS(C)D: 173.6 GRAMS
BH CV ECHO MEAS - LV MAX PG: 8.6 MMHG
BH CV ECHO MEAS - LV MEAN PG: 4 MMHG
BH CV ECHO MEAS - LV V1 MAX: 147 CM/SEC
BH CV ECHO MEAS - LV V1 VTI: 27.4 CM
BH CV ECHO MEAS - LVIDD: 4.3 CM
BH CV ECHO MEAS - LVIDS: 3.1 CM
BH CV ECHO MEAS - LVOT AREA: 3.7 CM2
BH CV ECHO MEAS - LVOT DIAM: 2.17 CM
BH CV ECHO MEAS - LVPWD: 1.2 CM
BH CV ECHO MEAS - MED PEAK E' VEL: 8.5 CM/SEC
BH CV ECHO MEAS - MV A MAX VEL: 84 CM/SEC
BH CV ECHO MEAS - MV DEC SLOPE: 352 CM/SEC2
BH CV ECHO MEAS - MV DEC TIME: 238 SEC
BH CV ECHO MEAS - MV E MAX VEL: 76.3 CM/SEC
BH CV ECHO MEAS - MV E/A: 0.91
BH CV ECHO MEAS - MV MAX PG: 3.8 MMHG
BH CV ECHO MEAS - MV MEAN PG: 2.06 MMHG
BH CV ECHO MEAS - MV P1/2T: 81.5 MSEC
BH CV ECHO MEAS - MV V2 VTI: 25.7 CM
BH CV ECHO MEAS - MVA(P1/2T): 2.7 CM2
BH CV ECHO MEAS - MVA(VTI): 3.9 CM2
BH CV ECHO MEAS - PA ACC TIME: 0.07 SEC
BH CV ECHO MEAS - PA V2 MAX: 133.8 CM/SEC
BH CV ECHO MEAS - PULM A REVS DUR: 0.12 SEC
BH CV ECHO MEAS - PULM A REVS VEL: 39 CM/SEC
BH CV ECHO MEAS - PULM DIAS VEL: 45.4 CM/SEC
BH CV ECHO MEAS - PULM S/D: 1.53
BH CV ECHO MEAS - PULM SYS VEL: 69.3 CM/SEC
BH CV ECHO MEAS - QP/QS: 0.51
BH CV ECHO MEAS - RAP SYSTOLE: 3 MMHG
BH CV ECHO MEAS - RV MAX PG: 3.1 MMHG
BH CV ECHO MEAS - RV V1 MAX: 88.7 CM/SEC
BH CV ECHO MEAS - RV V1 VTI: 19 CM
BH CV ECHO MEAS - RVOT DIAM: 1.86 CM
BH CV ECHO MEAS - RVSP: 22 MMHG
BH CV ECHO MEAS - SV(LVOT): 101 ML
BH CV ECHO MEAS - SV(MOD-SP2): 76 ML
BH CV ECHO MEAS - SV(MOD-SP4): 47 ML
BH CV ECHO MEAS - SV(RVOT): 51.6 ML
BH CV ECHO MEAS - TR MAX PG: 19.1 MMHG
BH CV ECHO MEAS - TR MAX VEL: 218.5 CM/SEC
BH CV ECHO MEASUREMENTS AVERAGE E/E' RATIO: 7.48
BH CV XLRA - RV BASE: 3.1 CM
BH CV XLRA - RV LENGTH: 6.5 CM
BH CV XLRA - RV MID: 2.6 CM
BH CV XLRA - TDI S': 13.1 CM/SEC
BILIRUB SERPL-MCNC: 1.1 MG/DL (ref 0–1.2)
BUN SERPL-MCNC: 13 MG/DL (ref 8–23)
BUN/CREAT SERPL: 5.2 (ref 7–25)
CA-I BLD-MCNC: 4.3 MG/DL (ref 4.6–5.4)
CA-I SERPL ISE-MCNC: 1.07 MMOL/L (ref 1.15–1.35)
CALCIUM SPEC-SCNC: 8.5 MG/DL (ref 8.6–10.5)
CHLORIDE SERPL-SCNC: 97 MMOL/L (ref 98–107)
CHLORIDE UR-SCNC: 20 MMOL/L
CO2 SERPL-SCNC: 26 MMOL/L (ref 22–29)
CREAT SERPL-MCNC: 2.51 MG/DL (ref 0.76–1.27)
CREAT UR-MCNC: 111.2 MG/DL
D-LACTATE SERPL-SCNC: 1.3 MMOL/L (ref 0.5–2)
DEPRECATED RDW RBC AUTO: 46.1 FL (ref 37–54)
EGFRCR SERPLBLD CKD-EPI 2021: 28.2 ML/MIN/1.73
ERYTHROCYTE [DISTWIDTH] IN BLOOD BY AUTOMATED COUNT: 14.9 % (ref 12.3–15.4)
GEN 5 2HR TROPONIN T REFLEX: 675 NG/L
GLOBULIN UR ELPH-MCNC: 2.7 GM/DL
GLUCOSE BLDC GLUCOMTR-MCNC: 128 MG/DL (ref 70–130)
GLUCOSE BLDC GLUCOMTR-MCNC: 128 MG/DL (ref 70–130)
GLUCOSE BLDC GLUCOMTR-MCNC: 146 MG/DL (ref 70–130)
GLUCOSE BLDC GLUCOMTR-MCNC: 148 MG/DL (ref 70–130)
GLUCOSE BLDC GLUCOMTR-MCNC: 202 MG/DL (ref 70–130)
GLUCOSE BLDC GLUCOMTR-MCNC: 99 MG/DL (ref 70–130)
GLUCOSE SERPL-MCNC: 127 MG/DL (ref 65–99)
HCT VFR BLD AUTO: 27.1 % (ref 37.5–51)
HGB BLD-MCNC: 8.7 G/DL (ref 13–17.7)
LEFT ATRIUM VOLUME INDEX: 20.2 ML/M2
MCH RBC QN AUTO: 27.5 PG (ref 26.6–33)
MCHC RBC AUTO-ENTMCNC: 32.1 G/DL (ref 31.5–35.7)
MCV RBC AUTO: 85.8 FL (ref 79–97)
MRSA DNA SPEC QL NAA+PROBE: NORMAL
PLATELET # BLD AUTO: 334 10*3/MM3 (ref 140–450)
PMV BLD AUTO: 9.4 FL (ref 6–12)
POTASSIUM SERPL-SCNC: 3.5 MMOL/L (ref 3.5–5.2)
PROT ?TM UR-MCNC: 160.7 MG/DL
PROT SERPL-MCNC: 5.4 G/DL (ref 6–8.5)
PROT/CREAT UR: 1445.1 MG/G CREA (ref 0–200)
QT INTERVAL: 401 MS
QTC INTERVAL: 507 MS
RBC # BLD AUTO: 3.16 10*6/MM3 (ref 4.14–5.8)
SINUS: 3.5 CM
SODIUM SERPL-SCNC: 135 MMOL/L (ref 136–145)
SODIUM UR-SCNC: <20 MMOL/L
STJ: 3.1 CM
TROPONIN T DELTA: -3 NG/L
VANCOMYCIN SERPL-MCNC: 19.4 MCG/ML (ref 5–40)
WBC NRBC COR # BLD: 22.31 10*3/MM3 (ref 3.4–10.8)

## 2023-10-12 PROCEDURE — 25010000002 HYDROCORTISONE SOD SUC (PF) 250 MG RECONSTITUTED SOLUTION: Performed by: INTERNAL MEDICINE

## 2023-10-12 PROCEDURE — 93306 TTE W/DOPPLER COMPLETE: CPT | Performed by: INTERNAL MEDICINE

## 2023-10-12 PROCEDURE — 84300 ASSAY OF URINE SODIUM: CPT | Performed by: INTERNAL MEDICINE

## 2023-10-12 PROCEDURE — 93306 TTE W/DOPPLER COMPLETE: CPT

## 2023-10-12 PROCEDURE — 84156 ASSAY OF PROTEIN URINE: CPT | Performed by: INTERNAL MEDICINE

## 2023-10-12 PROCEDURE — 82330 ASSAY OF CALCIUM: CPT | Performed by: INTERNAL MEDICINE

## 2023-10-12 PROCEDURE — 25810000003 SODIUM CHLORIDE 0.9 % SOLUTION: Performed by: INTERNAL MEDICINE

## 2023-10-12 PROCEDURE — 82948 REAGENT STRIP/BLOOD GLUCOSE: CPT

## 2023-10-12 PROCEDURE — 80053 COMPREHEN METABOLIC PANEL: CPT | Performed by: INTERNAL MEDICINE

## 2023-10-12 PROCEDURE — 84484 ASSAY OF TROPONIN QUANT: CPT | Performed by: PHYSICIAN ASSISTANT

## 2023-10-12 PROCEDURE — 25810000003 SODIUM CHLORIDE 0.9 % SOLUTION 250 ML FLEX CONT: Performed by: INTERNAL MEDICINE

## 2023-10-12 PROCEDURE — 25010000002 VANCOMYCIN 750 MG RECONSTITUTED SOLUTION 1 EACH VIAL: Performed by: INTERNAL MEDICINE

## 2023-10-12 PROCEDURE — 76705 ECHO EXAM OF ABDOMEN: CPT

## 2023-10-12 PROCEDURE — 63710000001 INSULIN REGULAR HUMAN PER 5 UNITS: Performed by: INTERNAL MEDICINE

## 2023-10-12 PROCEDURE — 25010000002 MAGNESIUM SULFATE IN D5W 1G/100ML (PREMIX) 1-5 GM/100ML-% SOLUTION: Performed by: INTERNAL MEDICINE

## 2023-10-12 PROCEDURE — 82140 ASSAY OF AMMONIA: CPT | Performed by: INTERNAL MEDICINE

## 2023-10-12 PROCEDURE — 87641 MR-STAPH DNA AMP PROBE: CPT | Performed by: INTERNAL MEDICINE

## 2023-10-12 PROCEDURE — 83605 ASSAY OF LACTIC ACID: CPT | Performed by: INTERNAL MEDICINE

## 2023-10-12 PROCEDURE — 82436 ASSAY OF URINE CHLORIDE: CPT | Performed by: INTERNAL MEDICINE

## 2023-10-12 PROCEDURE — 87040 BLOOD CULTURE FOR BACTERIA: CPT | Performed by: INTERNAL MEDICINE

## 2023-10-12 PROCEDURE — 25010000002 CEFEPIME PER 500 MG: Performed by: INTERNAL MEDICINE

## 2023-10-12 PROCEDURE — 99254 IP/OBS CNSLTJ NEW/EST MOD 60: CPT | Performed by: INTERNAL MEDICINE

## 2023-10-12 PROCEDURE — 80202 ASSAY OF VANCOMYCIN: CPT | Performed by: INTERNAL MEDICINE

## 2023-10-12 PROCEDURE — 85027 COMPLETE CBC AUTOMATED: CPT | Performed by: INTERNAL MEDICINE

## 2023-10-12 PROCEDURE — 25010000002 HEPARIN (PORCINE) PER 1000 UNITS: Performed by: INTERNAL MEDICINE

## 2023-10-12 PROCEDURE — 25010000002 HYDROCORTISONE SOD SUC (PF) 100 MG RECONSTITUTED SOLUTION: Performed by: INTERNAL MEDICINE

## 2023-10-12 PROCEDURE — 82570 ASSAY OF URINE CREATININE: CPT | Performed by: INTERNAL MEDICINE

## 2023-10-12 PROCEDURE — 99223 1ST HOSP IP/OBS HIGH 75: CPT | Performed by: INTERNAL MEDICINE

## 2023-10-12 RX ORDER — BISACODYL 10 MG
10 SUPPOSITORY, RECTAL RECTAL DAILY PRN
Status: DISCONTINUED | OUTPATIENT
Start: 2023-10-12 | End: 2023-10-20 | Stop reason: HOSPADM

## 2023-10-12 RX ORDER — POLYETHYLENE GLYCOL 3350 17 G/17G
17 POWDER, FOR SOLUTION ORAL DAILY PRN
Status: DISCONTINUED | OUTPATIENT
Start: 2023-10-12 | End: 2023-10-20 | Stop reason: HOSPADM

## 2023-10-12 RX ORDER — AMOXICILLIN 250 MG
2 CAPSULE ORAL 2 TIMES DAILY
Status: DISCONTINUED | OUTPATIENT
Start: 2023-10-12 | End: 2023-10-20 | Stop reason: HOSPADM

## 2023-10-12 RX ORDER — HEPARIN SODIUM 5000 [USP'U]/ML
5000 INJECTION, SOLUTION INTRAVENOUS; SUBCUTANEOUS EVERY 12 HOURS SCHEDULED
Status: DISCONTINUED | OUTPATIENT
Start: 2023-10-12 | End: 2023-10-20 | Stop reason: HOSPADM

## 2023-10-12 RX ORDER — MAGNESIUM SULFATE 1 G/100ML
1 INJECTION INTRAVENOUS
Status: COMPLETED | OUTPATIENT
Start: 2023-10-12 | End: 2023-10-12

## 2023-10-12 RX ORDER — IBUPROFEN 600 MG/1
1 TABLET ORAL
Status: DISCONTINUED | OUTPATIENT
Start: 2023-10-12 | End: 2023-10-20 | Stop reason: HOSPADM

## 2023-10-12 RX ORDER — NICOTINE POLACRILEX 4 MG
15 LOZENGE BUCCAL
Status: DISCONTINUED | OUTPATIENT
Start: 2023-10-12 | End: 2023-10-20 | Stop reason: HOSPADM

## 2023-10-12 RX ORDER — FAMOTIDINE 10 MG/ML
20 INJECTION, SOLUTION INTRAVENOUS DAILY
Status: DISCONTINUED | OUTPATIENT
Start: 2023-10-12 | End: 2023-10-13

## 2023-10-12 RX ORDER — SODIUM CHLORIDE 0.9 % (FLUSH) 0.9 %
10 SYRINGE (ML) INJECTION AS NEEDED
Status: DISCONTINUED | OUTPATIENT
Start: 2023-10-12 | End: 2023-10-20 | Stop reason: HOSPADM

## 2023-10-12 RX ORDER — ACETAMINOPHEN 325 MG/1
650 TABLET ORAL EVERY 4 HOURS PRN
Status: DISCONTINUED | OUTPATIENT
Start: 2023-10-12 | End: 2023-10-20 | Stop reason: HOSPADM

## 2023-10-12 RX ORDER — ACETAMINOPHEN 650 MG/1
650 SUPPOSITORY RECTAL EVERY 6 HOURS PRN
Status: DISCONTINUED | OUTPATIENT
Start: 2023-10-12 | End: 2023-10-20 | Stop reason: HOSPADM

## 2023-10-12 RX ORDER — SODIUM CHLORIDE 9 MG/ML
50 INJECTION, SOLUTION INTRAVENOUS CONTINUOUS
Status: DISCONTINUED | OUTPATIENT
Start: 2023-10-12 | End: 2023-10-14

## 2023-10-12 RX ORDER — HEPARIN SODIUM 1000 [USP'U]/ML
5000 INJECTION, SOLUTION INTRAVENOUS; SUBCUTANEOUS AS NEEDED
Status: DISCONTINUED | OUTPATIENT
Start: 2023-10-12 | End: 2023-10-20 | Stop reason: HOSPADM

## 2023-10-12 RX ORDER — SODIUM CHLORIDE 9 MG/ML
40 INJECTION, SOLUTION INTRAVENOUS AS NEEDED
Status: DISCONTINUED | OUTPATIENT
Start: 2023-10-12 | End: 2023-10-20 | Stop reason: HOSPADM

## 2023-10-12 RX ORDER — SODIUM CHLORIDE 0.9 % (FLUSH) 0.9 %
10 SYRINGE (ML) INJECTION EVERY 12 HOURS SCHEDULED
Status: DISCONTINUED | OUTPATIENT
Start: 2023-10-12 | End: 2023-10-20 | Stop reason: HOSPADM

## 2023-10-12 RX ORDER — BISACODYL 5 MG/1
5 TABLET, DELAYED RELEASE ORAL DAILY PRN
Status: DISCONTINUED | OUTPATIENT
Start: 2023-10-12 | End: 2023-10-20 | Stop reason: HOSPADM

## 2023-10-12 RX ORDER — NITROGLYCERIN 0.4 MG/1
0.4 TABLET SUBLINGUAL
Status: DISCONTINUED | OUTPATIENT
Start: 2023-10-12 | End: 2023-10-20 | Stop reason: HOSPADM

## 2023-10-12 RX ORDER — DEXTROSE MONOHYDRATE 25 G/50ML
25 INJECTION, SOLUTION INTRAVENOUS
Status: DISCONTINUED | OUTPATIENT
Start: 2023-10-12 | End: 2023-10-20 | Stop reason: HOSPADM

## 2023-10-12 RX ORDER — ONDANSETRON 2 MG/ML
4 INJECTION INTRAMUSCULAR; INTRAVENOUS EVERY 6 HOURS PRN
Status: DISCONTINUED | OUTPATIENT
Start: 2023-10-12 | End: 2023-10-20 | Stop reason: HOSPADM

## 2023-10-12 RX ADMIN — Medication 10 ML: at 03:03

## 2023-10-12 RX ADMIN — HYDROCORTISONE SODIUM SUCCINATE 200 MG: 250 INJECTION, POWDER, FOR SOLUTION INTRAMUSCULAR; INTRAVENOUS at 00:19

## 2023-10-12 RX ADMIN — FAMOTIDINE 20 MG: 10 INJECTION INTRAVENOUS at 15:06

## 2023-10-12 RX ADMIN — CEFEPIME 2000 MG: 2 INJECTION, POWDER, FOR SOLUTION INTRAVENOUS at 23:03

## 2023-10-12 RX ADMIN — HEPARIN SODIUM 5000 UNITS: 5000 INJECTION INTRAVENOUS; SUBCUTANEOUS at 03:06

## 2023-10-12 RX ADMIN — INSULIN HUMAN 8 UNITS: 100 INJECTION, SOLUTION PARENTERAL at 20:47

## 2023-10-12 RX ADMIN — Medication 10 ML: at 08:33

## 2023-10-12 RX ADMIN — Medication 10 ML: at 20:48

## 2023-10-12 RX ADMIN — HYDROCORTISONE SODIUM SUCCINATE 100 MG: 100 INJECTION, POWDER, FOR SOLUTION INTRAMUSCULAR; INTRAVENOUS at 15:59

## 2023-10-12 RX ADMIN — VANCOMYCIN HYDROCHLORIDE 750 MG: 750 INJECTION, POWDER, LYOPHILIZED, FOR SOLUTION INTRAVENOUS at 15:05

## 2023-10-12 RX ADMIN — HEPARIN SODIUM 5000 UNITS: 1000 INJECTION, SOLUTION INTRAVENOUS; SUBCUTANEOUS at 05:24

## 2023-10-12 RX ADMIN — HYDROCORTISONE SODIUM SUCCINATE 100 MG: 100 INJECTION, POWDER, FOR SOLUTION INTRAMUSCULAR; INTRAVENOUS at 08:33

## 2023-10-12 RX ADMIN — HEPARIN SODIUM 5000 UNITS: 5000 INJECTION INTRAVENOUS; SUBCUTANEOUS at 08:33

## 2023-10-12 RX ADMIN — CEFEPIME 2000 MG: 2 INJECTION, POWDER, FOR SOLUTION INTRAVENOUS at 10:42

## 2023-10-12 RX ADMIN — HEPARIN SODIUM 5000 UNITS: 5000 INJECTION INTRAVENOUS; SUBCUTANEOUS at 23:03

## 2023-10-12 RX ADMIN — SODIUM CHLORIDE 100 ML/HR: 9 INJECTION, SOLUTION INTRAVENOUS at 03:15

## 2023-10-12 RX ADMIN — MAGNESIUM SULFATE HEPTAHYDRATE 1 G: 1 INJECTION, SOLUTION INTRAVENOUS at 17:37

## 2023-10-12 RX ADMIN — MAGNESIUM SULFATE HEPTAHYDRATE 1 G: 1 INJECTION, SOLUTION INTRAVENOUS at 15:58

## 2023-10-12 RX ADMIN — MAGNESIUM SULFATE HEPTAHYDRATE 1 G: 1 INJECTION, SOLUTION INTRAVENOUS at 15:06

## 2023-10-12 NOTE — NURSING NOTE
Pt recently discharged from [facility] and was in [place] manner before he transferred here. As per his brother (tom), was diagnosed with diann and currently in dialysis 3 weeks ago and yesterday was last dialysis. For further information can call Dr. Tom Cardozo (Brother) phone no. [phone].

## 2023-10-12 NOTE — PAYOR COMM NOTE
"Chandler Cardozo (63 y.o. Male)                           ATTENTION; INPATIENT AUTHORIZATION REQUEST - ICD 10 - R65.21 , N17.9                           [facility]AC[facility] N[facility] [id number] [facility] [address] [facility] [facility] [id number]                          PHYSICIAN NPI - [id number] [facility] [facility] [address] [facility] [id number]                            REPLY TO UR DEPT  [phone] OR CALL   NISREEN BLACKWELL LPN        Date of Birth   1960    Social Security Number       Address   [address] [facility] [address]    Home Phone       MRN   [id number]       Samaritan   Samaritan    Marital Status                               Admission Date   10/11/23    Admission Type   Emergency    Admitting Provider   Jesus Shay Jr., MD    Attending Provider   Jesus Shay Jr., MD    Department, Room/Bed   [facility] INTENSIVE CARE, I373/1       Discharge Date       Discharge Disposition       Discharge Destination                                 Attending Provider: eJsus Shay Jr., MD    Allergies: Kiwi Extract    Isolation: None   Infection: None   Code Status: CPR    Ht: 170.2 cm (67\")   Wt: 81 kg (178 lb 9.2 oz)    Admission Cmt: None   Principal Problem: Septic shock [A41.9,R65.21]                   Active Insurance as of 10/11/2023       Primary Coverage       Payor Plan Insurance Group Employer/Plan Group    [facility] BY ERNESTO [facility] BY ERNESTO [id number]       Payor Plan Address Payor Plan Phone Number Payor Plan Fax Number Effective Dates    [address] [address]   2/1/2022 - None Entered    [facility] [id number]         Subscriber Name Subscriber Birth Date Member ID       CHANDLER CARDOZO 1960 [id number]                     Emergency Contacts        (Rel.) Home Phone Work Phone Mobile Phone    ANN CARDOZO (Son) [phone]    ANTHONY DWYER (Sister) [phone]    " Judy Garg (Sister) -- -- [phone]                 History & Physical        Jesus Wilcox Jr., MD at 10/12/23 0016                        Patient Care Team:  Provider, No Known as PCP - General      Subjective    Patient is a 63 y.o. male.  Asked to admit for septic shock.  [facility] Saturday after a 3-week stay with several episodes of sepsis the etiology was never determined they suspected possibly an aspiration pneumonia.  Unfortunately there are no [facility] records on the care everywhere and calls to [facility] medical records they could not provide anything.  Thankfully his sisters arrived and his brother is an ID physician in [place] and they were able to provide excellent history.  He has metastatic melanoma with liver metastases.  He had been on immunotherapy and that had to be stopped he had a lot of side effects he had checkpoint inhibitor enteritis among others he has been on chronic steroids for this they note that every time he got septic he would get hypotensive and he was responded briskly to steroids.  They have been trying to wean him back off of steroids.  With his previous episodes of sepsis he sustained some acute kidney injury and has been dialyzed he has a temporary tunneled dialysis catheter he had been making urine and they were hopeful that he would not need continued dialysis.  Apparently yesterday he was bright and alert and today he just started getting lethargic and weaker.  He has undergone dialysis the last couple of days.  The patient initially was quite lethargic but he is waking up now and he denies any pain he denies shortness of breath may be a little cough.  He may have had a diarrhea stool today he is not certain.  We have not seen any diarrhea in the emergency department.  He does make urine and he has not had any dysuria or hematuria.  No nausea or vomiting.  He is additionally a diabetic  "and he had some surgery on his left second toe related to diabetes complications.      Review of Systems:  As above patient is still pretty lethargic      History  No past medical history on file.  No past surgical history on file.  Social History     Socioeconomic History    Marital status:      No family history on file.      Allergies:  Patient has no allergy information on record.    Medications:  Prior to Admission medications    Not on File     Hydrocortisone Sod Suc (PF), 200 mg, Intravenous, Once  potassium chloride, 10 mEq, Intravenous, Once  vancomycin, 20 mg/kg, Intravenous, Once      norepinephrine, 0.02-0.3 mcg/kg/min, Last Rate: 0.06 mcg/kg/min (10/11/23 2330)        Objective    Vital Signs  Vital Sign Min/Max for last 24 hours  Temp  Min: 100.4 °F (38 °C)  Max: 100.4 °F (38 °C)   BP  Min: 81/48  Max: 102/56   Pulse  Min: 89  Max: 97   Resp  Min: 16  Max: 16   SpO2  Min: 90 %  Max: 96 %   No data recorded   Weight  Min: 83.9 kg (184 lb 14.4 oz)  Max: 83.9 kg (184 lb 14.4 oz)       Intake/Output Summary (Last 24 hours) at 10/12/2023 0016  Last data filed at 10/11/2023 2302  Gross per 24 hour   Intake 600 ml   Output --   Net 600 ml     I/O this shift:  In: 600 [I.V.:500; IV Piggyback:100]  Out: -   Last Weight and Admission Weight        10/11/23  2153   Weight: 83.9 kg (184 lb 14.4 oz)     Flowsheet Rows      Flowsheet Row First Filed Value   Admission Height 170.2 cm (67\") Documented at 10/11/2023 2153   Admission Weight 83.9 kg (184 lb 14.4 oz) Documented at 10/11/2023 2153            Body mass index is 28.96 kg/m².           Physical Exam:  General Appearance: Well-developed white male he is resting on a stretcher again he is pretty somnolent but we can arouse him he will talk he does move all extremities  Eyes: Conjunctiva are clear and anicteric pupils are about 3 to 3-1/2 mm and equal bilaterally  ENT: Mucous membranes are dry no erythema no exudates, nasal septum midline  Neck: No " adenopathy or thyromegaly no jugular venous tension and trachea midline  Lungs: Clear I do not hear wheezes rales or rhonchi  Cardiac: Regular rate rhythm no murmur no gallop  Abdomen: Soft nontender no palpable hepatosplenomegaly or masses  : Normal external male genitalia  Musculoskeletal: He has looks like may be some sutures in the distal portion of his left second toe that toes a little shorter looks like may be a portion was removed his sister said he had surgery secondary to diabetes complications on that toe.  Right anterior chest tunneled dialysis type catheter the site looks okay  Skin: No jaundice no petechiae I do not see any significant rashes  Neuro: He is waking up apparently was pretty obtunded when he arrived he is answering some questions he does fall asleep easily his answers seem to be appropriate he is moving all 4 extremities to command  Extremities/P Vascular: No clubbing no cyanosis no edema he does have palpable radial and dorsalis pedis pulses now his blood pressure is up he is on Levophed drip currently  MSE: Hard to tell he is very flat and very somnolent currently      Labs:  Results from last 7 days   Lab Units 10/11/23  2156   GLUCOSE mg/dL 94   SODIUM mmol/L 135*   POTASSIUM mmol/L 3.1*   MAGNESIUM mg/dL 1.3*   CO2 mmol/L 27.5   CHLORIDE mmol/L 95*   ANION GAP mmol/L 12.5   CREATININE mg/dL 2.30*   BUN mg/dL 10   BUN / CREAT RATIO  4.3*   CALCIUM mg/dL 8.6   ALK PHOS U/L 547*   TOTAL PROTEIN g/dL 5.5*   ALT (SGPT) U/L 25   AST (SGOT) U/L 146*   BILIRUBIN mg/dL 0.9   ALBUMIN g/dL 2.8*   GLOBULIN gm/dL 2.7     Estimated Creatinine Clearance: 34.5 mL/min (A) (by C-G formula based on SCr of 2.3 mg/dL (H)).      Results from last 7 days   Lab Units 10/11/23  2156   WBC 10*3/mm3 18.69*   RBC 10*6/mm3 3.05*   HEMOGLOBIN g/dL 8.3*   HEMATOCRIT % 26.2*   MCV fL 85.9   MCH pg 27.2   MCHC g/dL 31.7   RDW % 15.0   RDW-SD fl 47.5   MPV fL 9.8   PLATELETS 10*3/mm3 379   NEUTROPHIL % % 76.7*    LYMPHOCYTE % % 13.4*   MONOCYTES % % 4.8*   EOSINOPHIL % % 0.6   BASOPHIL % % 0.4   IMM GRAN % % 4.1*   NEUTROS ABS 10*3/mm3 14.33*   LYMPHS ABS 10*3/mm3 2.50   MONOS ABS 10*3/mm3 0.90   EOS ABS 10*3/mm3 0.12   BASOS ABS 10*3/mm3 0.07   IMMATURE GRANS (ABS) 10*3/mm3 0.77*   NRBC /100 WBC 0.3*         Results from last 7 days   Lab Units 10/11/23  2156   HSTROP T ng/L 678*             Results from last 7 days   Lab Units 10/11/23  2156   LACTATE mmol/L 1.8   PROCALCITONIN ng/mL 10.60*         Microbiology Results (last 10 days)       Procedure Component Value - Date/Time    Respiratory Panel PCR w/COVID-19(SARS-CoV-2) EL/BARNEY/DYLAN/PAD/COR/MAD/NIGEL In-House, NP Swab in UTM/VTM, 3-4 HR TAT - Swab, Nasopharynx [[id number]]  (Normal) Collected: 10/11/23 2235    Lab Status: Final result Specimen: Swab from Nasopharynx Updated: 10/11/23 2343     ADENOVIRUS, PCR Not Detected     Coronavirus 229E Not Detected     Coronavirus HKU1 Not Detected     Coronavirus NL63 Not Detected     Coronavirus OC43 Not Detected     COVID19 Not Detected     Human Metapneumovirus Not Detected     Human Rhinovirus/Enterovirus Not Detected     Influenza A PCR Not Detected     Influenza B PCR Not Detected     Parainfluenza Virus 1 Not Detected     Parainfluenza Virus 2 Not Detected     Parainfluenza Virus 3 Not Detected     Parainfluenza Virus 4 Not Detected     RSV, PCR Not Detected     Bordetella pertussis pcr Not Detected     Bordetella parapertussis PCR Not Detected     Chlamydophila pneumoniae PCR Not Detected     Mycoplasma pneumo by PCR Not Detected    Narrative:      In the setting of a positive respiratory panel with a viral infection PLUS a negative procalcitonin without other underlying concern for bacterial infection, consider observing off antibiotics or discontinuation of antibiotics and continue supportive care. If the respiratory panel is positive for atypical bacterial infection (Bordetella pertussis, Chlamydophila pneumoniae, or  Mycoplasma pneumoniae), consider antibiotic de-escalation to target atypical bacterial infection.              Diagnostics:  XR Chest 1 View    Result Date: 10/11/2023  EMERGENCY PORTABLE VIEW OF THE CHEST ON 10/11/2023  CLINICAL HISTORY: Altered mental status.  COMPARISON: There are no prior chest x-rays for comparison.  FINDINGS: There is a right internal jugular HemoCath in place. Its distal tip projects over the superior aspect of the right atrium. The cardiomediastinal silhouette is upper limits of normal. The pulmonary vasculature is within normal limits. The lung volumes are low with horizontal platelike atelectasis at the lung bases. The remainder of the lungs are clear. Costophrenic angles are sharp.      1. No definite active disease is seen in the chest. Lung volumes are low with horizontal platelike atelectasis at the lung bases. There is a right internal jugular HemoCath in place with its tip in the superior aspect of the right atrium.      CT Head Without Contrast    Result Date: 10/11/2023  EMERGENCY CT SCAN OF THE HEAD WITHOUT CONTRAST ON 10/11/2023  CLINICAL HISTORY: Altered mental status and confusion  TECHNIQUE: Spiral CT images were obtained from the base of the skull to the vertex without intravenous contrast. The images were reformatted and are submitted in 3 mm thick axial, sagittal and coronal CT sections with brain algorithm.  COMPARISON: There are no prior head CTs for comparison.  FINDINGS: There is some mild patchy low-density in the frontal periventricular white matter consistent with mild small vessel disease. The remainder of the brain parenchyma is normal in attenuation. There is mild diffuse cerebral atrophy. The ventricles are normal in size. I see no focal mass effect. There is no midline shift. No extra axial fluid collections are identified. There is no evidence of acute intracranial hemorrhage. The calvarium and the skull base are normal in appearance. The paranasal sinuses and  the mastoid air cells and the middle ear cavities are clear.      1. No acute intracranial abnormality is identified. There is mild small vessel disease in the periventricular white matter of the cerebral hemispheres. There is mild diffuse cerebral atrophy. The remainder of the head CT is normal. The etiology of the patient's confusion and altered mental status is not established on this exam.  Radiation dose reduction techniques were utilized, including automated exposure control and exposure modulation based on body size.   This report was finalized on 10/11/2023 11:23 PM by Dr. Jeff Vargas M.D on Workstation: [id number]          Viewed the chest x-ray and he does have a dialysis type catheter is a little basilar atelectasis probably there may be a little haziness in that right upper chest I do not have any old films for comparison    Assessment & Plan    Shock presumably septic apparently he has had several episodes of this with no definitive source identified.  His family has noted that he has been on steroids for many months and they have been weaning him and every time he goes into shock he responds briskly to steroids.  He may have significant adrenal insufficiency I am going to go ahead and give him a stat dose of hydrocortisone.  We will continue vasopressors he is 9 received fluids already in the emergency department may be able to liberalize his fluids a little they were hesitant with his renal dysfunction but he is making urine.  His troponin is up now this may be due to his renal failure also have to put cardiac in the differential for shock causes.  I am going to hold on central line for his vasopressors until I see how he responds to the steroids given the family's report.  Infection certainly suggest the possibility of infection we will panculture blood and urine certainly on the differential would be a pneumonia, line sepsis.  His brother says that they had did an LP and MRIs looking for sources  echocardiogram nothing was found at [facility].  I will get infectious disease involved here and the brother infectious disease in [place] would appreciate a call from our infectious disease.  Again the infection has to leave my differential with his procalcitonin up even with his renal insufficiency is fairly high and he has a significant leukocytosis.  Acute kidney injury with acute renal failure patient has been receiving dialysis apparently had ATN from shock previously but has been making urine fairly good volumes recently according to family.  We will get nephrology involved I am afraid his kidneys may take another hit with this hypotension and vasopressor.  Metastatic melanoma with abdominal nodes and liver metastases he has had several brain MRIs within the last several months and no CNS metastases have been noted.  He has not been on any chemo or immunotherapy for a couple of months due to his illness and previous intolerance to drugs.  Anemia we do not know his baseline family thinks he is chronically low will follow his hemoglobin see no evidence of GI bleed.  Elevated troponin we will follow this EKG does not look like an acute ischemic changes be up related his renal disease he very well could have a non-ST elevation type II MI demand ischemia with his shock.  I think he probably deserves an echocardiogram both to rule out endocarditis and to evaluate LV function.  Hypokalemia mild hesitant to give him too much potassium with his renal dysfunction we will monitor closely  Elevated AST his ALT is normal this could suggest a cardiac event could also just be some ischemic hepatitis or related to his metastases.  Will trend.  Protein calorie malnutrition comes in with a very low albumin  Diabetes mellitus type 2 we will put him on sliding scale insulin until we determine his insulin requirements will use a frequent dosing regimen      Jesus Wilcox Jr, MD  10/12/23  00:16 EDT    Time: Critical care time 76  minutes, 40 minutes of this time was before midnight and 36 minutes was after midnight 10/11 and 10/12/2023    Electronically signed by Jesus Wilcox Jr., MD at 10/12/23 0121          Emergency Department Notes        Olamide Lipscomb RN at 10/12/23 0028          Nursing report ED to floor  Delvin Cardozo  63 y.o.  male    HPI :   Chief Complaint   Patient presents with    Altered Mental Status       Admitting doctor:   Jesus Wilcox Jr., MD    Admitting diagnosis:   The primary encounter diagnosis was Septic shock. A diagnosis of Hypotension, unspecified hypotension type was also pertinent to this visit.    Code status:   Current Code Status       Date Active Code Status Order ID Comments User Context       Not on file            Allergies:   Patient has no allergy information on record.    Isolation:   No active isolations    Intake and Output    Intake/Output Summary (Last 24 hours) at 10/12/2023 0028  Last data filed at 10/11/2023 2302  Gross per 24 hour   Intake 600 ml   Output --   Net 600 ml       Weight:       10/11/23  2153   Weight: 83.9 kg (184 lb 14.4 oz)       Most recent vitals:   Vitals:    10/11/23 2351 10/12/23 0001 10/12/23 0011 10/12/23 0021   BP: 99/75 102/56 113/68 99/56   Pulse: 91 89 98 92   Resp:       Temp:       SpO2: 93% 94% 95% 94%   Weight:       Height:           Active LDAs/IV Access:   Lines, Drains & Airways       Active LDAs       Name Placement date Placement time Site Days    Peripheral IV Anterior;Left Forearm --  --  Forearm  --    Peripheral IV 10/11/23 2200 Anterior;Distal;Right Forearm 10/11/23  2200  Forearm  less than 1                    Labs (abnormal labs have a star):   Labs Reviewed   COMPREHENSIVE METABOLIC PANEL - Abnormal; Notable for the following components:       Result Value    Creatinine 2.30 (*)     Sodium 135 (*)     Potassium 3.1 (*)     Chloride 95 (*)     Total Protein 5.5 (*)     Albumin 2.8 (*)     AST (SGOT) 146 (*)     Alkaline Phosphatase 547  "(*)     BUN/Creatinine Ratio 4.3 (*)     eGFR 31.3 (*)     All other components within normal limits    Narrative:     GFR Normal >60  Chronic Kidney Disease <60  Kidney Failure <15     URINALYSIS W/ MICROSCOPIC IF INDICATED (NO CULTURE) - Abnormal; Notable for the following components:    Protein,  mg/dL (2+) (*)     All other components within normal limits   MAGNESIUM - Abnormal; Notable for the following components:    Magnesium 1.3 (*)     All other components within normal limits   TROPONIN - Abnormal; Notable for the following components:    HS Troponin T 678 (*)     All other components within normal limits    Narrative:     High Sensitive Troponin T Reference Range:  <10.0 ng/L- Negative Female for AMI  <15.0 ng/L- Negative Male for AMI  >=10 - Abnormal Female indicating possible myocardial injury.  >=15 - Abnormal Male indicating possible myocardial injury.   Clinicians would have to utilize clinical acumen, EKG, Troponin, and serial changes to determine if it is an Acute Myocardial Infarction or myocardial injury due to an underlying chronic condition.        PROCALCITONIN - Abnormal; Notable for the following components:    Procalcitonin 10.60 (*)     All other components within normal limits    Narrative:     As a Marker for Sepsis (Non-Neonates):    1. <0.5 ng/mL represents a low risk of severe sepsis and/or septic shock.  2. >2 ng/mL represents a high risk of severe sepsis and/or septic shock.    As a Marker for Lower Respiratory Tract Infections that require antibiotic therapy:    PCT on Admission    Antibiotic Therapy       6-12 Hrs later    >0.5                Strongly Recommended  >0.25 - <0.5        Recommended   0.1 - 0.25          Discouraged              Remeasure/reassess PCT  <0.1                Strongly Discouraged     Remeasure/reassess PCT    As 28 day mortality risk marker: \"Change in Procalcitonin Result\" (>80% or <=80%) if Day 0 (or Day 1) and Day 4 values are available. Refer to " [email or web address]://[email or web address]-pct-calculator.[email or web address]    Change in PCT <=80%  A decrease of PCT levels below or equal to 80% defines a positive change in PCT test result representing a higher risk for 28-day all-cause mortality of patients diagnosed with severe sepsis for septic shock.    Change in PCT >80%  A decrease of PCT levels of more than 80% defines a negative change in PCT result representing a lower risk for 28-day all-cause mortality of patients diagnosed with severe sepsis or septic shock.      CBC WITH AUTO DIFFERENTIAL - Abnormal; Notable for the following components:    WBC 18.69 (*)     RBC 3.05 (*)     Hemoglobin 8.3 (*)     Hematocrit 26.2 (*)     Neutrophil % 76.7 (*)     Lymphocyte % 13.4 (*)     Monocyte % 4.8 (*)     Immature Grans % 4.1 (*)     Neutrophils, Absolute 14.33 (*)     Immature Grans, Absolute 0.77 (*)     nRBC 0.3 (*)     All other components within normal limits   URINALYSIS, MICROSCOPIC ONLY - Abnormal; Notable for the following components:    WBC, UA 6-12 (*)     Bacteria, UA Trace (*)     All other components within normal limits   RESPIRATORY PANEL PCR W/ COVID-19 (SARS-COV-2) EL/BARNEY/DYLAN/PAD/COR/MAD/NIGEL IN-HOUSE, NP SWAB IN [facility], 3-4 HR TAT - Normal    Narrative:     In the setting of a positive respiratory panel with a viral infection PLUS a negative procalcitonin without other underlying concern for bacterial infection, consider observing off antibiotics or discontinuation of antibiotics and continue supportive care. If the respiratory panel is positive for atypical bacterial infection (Bordetella pertussis, Chlamydophila pneumoniae, or Mycoplasma pneumoniae), consider antibiotic de-escalation to target atypical bacterial infection.   LACTIC ACID, PLASMA - Normal   BLOOD CULTURE   BLOOD CULTURE   HIGH SENSITIVITIY TROPONIN T 2HR   CBC AND DIFFERENTIAL    Narrative:     The following orders were created for panel order CBC & Differential.  Procedure                                Abnormality         Status                     ---------                               -----------         ------                     CBC Auto Differential[[id number]]        Abnormal            Final result                 Please view results for these tests on the individual orders.       EKG:   ECG 12 Lead Altered Mental Status   Preliminary Result   HEART RATE= 96  bpm   RR Interval= 625  ms   NE Interval= 121  ms   P Horizontal Axis= 23  deg   P Front Axis= 42  deg   QRSD Interval= 110  ms   QT Interval= 401  ms   QTcB= 507  ms   QRS Axis= -1  deg   T Wave Axis= 25  deg   - ABNORMAL ECG -   Sinus rhythm   Prolonged QT interval   Electronically Signed By:    Date and Time of Study: 2023-10-11 21:50:19          Meds given in ED:   Medications   sodium chloride 0.9 % flush 10 mL (has no administration in time range)   vancomycin 1750 mg/500 mL 0.9% NS IVPB ([facility]) (1,750 mg Intravenous New Bag 10/11/23 2318)   norepinephrine (LEVOPHED) 8 mg in 250 mL NS infusion (premix) (0.06 mcg/kg/min × 83.9 kg Intravenous New Bag 10/11/23 2330)   potassium chloride 10 mEq in 100 mL IVPB (10 mEq Intravenous New Bag 10/11/23 2339)   cefepime 2 gm IVPB in 100 ml NS (VTB) (0 mg Intravenous Stopped 10/11/23 2302)   Hydrocortisone Sod Suc (PF) (Solu-CORTEF) injection 200 mg (200 mg Intravenous Given 10/12/23 0019)       Imaging results:  XR Chest 1 View    Result Date: 10/11/2023  1. No definite active disease is seen in the chest. Lung volumes are low with horizontal platelike atelectasis at the lung bases. There is a right internal jugular HemoCath in place with its tip in the superior aspect of the right atrium.      CT Head Without Contrast    Result Date: 10/11/2023  1. No acute intracranial abnormality is identified. There is mild small vessel disease in the periventricular white matter of the cerebral hemispheres. There is mild diffuse cerebral atrophy. The remainder of the head CT is normal. The etiology of the patient's  confusion and altered mental status is not established on this exam.  Radiation dose reduction techniques were utilized, including automated exposure control and exposure modulation based on body size.   This report was finalized on 10/11/2023 11:23 PM by Dr. Jeff Vargas M.D on Workstation: [id number]       Ambulatory status:   - bedrest    Social issues:   Social History     Socioeconomic History    Marital status:        NIH Stroke Scale:       Olamide Lipscomb RN  10/12/23 00:28 EDT         Electronically signed by Olamide Lipscomb RN at 10/12/23 0028       Lesli Norris PCT at 10/11/23 5583          Called [facility] medical Records to obtain records from [facility]. Unable to obtain records at this time. Pt was admitted there on 10/10/23 with no discharge date for records. Spoke with Isadora.     Electronically signed by Lesli Norris PCT at 10/11/23 1317       Charlie Avitia III, PA at 10/11/23 2152        Procedure Orders    1. Critical Care [[id number]] ordered by Charlie Avitia III, PA                  EMERGENCY DEPARTMENT ENCOUNTER    Room Number:  I373/1  PCP: Provider, No Known      HPI:  Chief Complaint: AMS  A complete HPI/ROS/PMH/PSH/SH/FH are unobtainable due to: Patient is a poor historian and confused.  Context: Delvin Cardozo is a 63 y.o. male who presents [facility] the ED [facility].  Patient has Hx of metastatic dz with alleged Kidney involvement.  Patient was recently admitted to [facility] for encephlopathy.  When the patient was discharged from [facility] he allegedly began routine dialysis.  Patient denies anything hurting him at this time.  It is reported that he has been confused and getting worse since this morning.  Exact time of onset unknown.    Nursing home paperwork shows patient is not on anticoagulants.There are not records in Epic on this patient to be reviewed.  Nephrologist is unknown at this time.          PAST MEDICAL HISTORY  Active Ambulatory  Problems     Diagnosis Date Noted    No Active Ambulatory Problems     Resolved Ambulatory Problems     Diagnosis Date Noted    No Resolved Ambulatory Problems     No Additional Past Medical History         PAST SURGICAL HISTORY  No past surgical history on file.      FAMILY HISTORY  No family history on file.      SOCIAL HISTORY  Social History     Socioeconomic History    Marital status:          ALLERGIES  Patient has no allergy information on record.        REVIEW OF SYSTEMS  Review of Systems     All systems reviewed and negative except for those discussed in HPI.       PHYSICAL EXAM  ED Triage Vitals   Temp Heart Rate Resp BP SpO2   10/11/23 2147 10/11/23 2147 10/11/23 2153 10/11/23 2147 10/11/23 2147   100.4 °F (38 °C) 93 16 (!) 89/53 92 %      Temp src Heart Rate Source Patient Position BP Location FiO2 (%)   -- -- -- -- --              Physical Exam      GENERAL: Frail in appearance, febrile, hypotensive, in significant distress   HENT: nares patent  EYES: no scleral icterus  CV: regular rhythm, normal rate, no murmur noted  RESPIRATORY: normal effort  ABDOMEN: soft, nontender  MUSCULOSKELETAL: no deformity  NEURO: alert to self disoriented to date place and time, moves all extremities, follows commands, speech slurred  PSYCH:  cooperative  SKIN: warm, dry    Vital signs and nursing notes reviewed.          LAB RESULTS  Recent Results (from the past 24 hour(s))   ECG 12 Lead Altered Mental Status    Collection Time: 10/11/23  9:50 PM   Result Value Ref Range    QT Interval 401 ms    QTC Interval 507 ms   Comprehensive Metabolic Panel    Collection Time: 10/11/23  9:56 PM    Specimen: Blood   Result Value Ref Range    Glucose 94 65 - 99 mg/dL    BUN 10 8 - 23 mg/dL    Creatinine 2.30 (H) 0.76 - 1.27 mg/dL    Sodium 135 (L) 136 - 145 mmol/L    Potassium 3.1 (L) 3.5 - 5.2 mmol/L    Chloride 95 (L) 98 - 107 mmol/L    CO2 27.5 22.0 - 29.0 mmol/L    Calcium 8.6 8.6 - 10.5 mg/dL    Total Protein 5.5 (L)  6.0 - 8.5 g/dL    Albumin 2.8 (L) 3.5 - 5.2 g/dL    ALT (SGPT) 25 1 - 41 U/L    AST (SGOT) 146 (H) 1 - 40 U/L    Alkaline Phosphatase 547 (H) 39 - 117 U/L    Total Bilirubin 0.9 0.0 - 1.2 mg/dL    Globulin 2.7 gm/dL    A/G Ratio 1.0 g/dL    BUN/Creatinine Ratio 4.3 (L) 7.0 - 25.0    Anion Gap 12.5 5.0 - 15.0 mmol/L    eGFR 31.3 (L) >60.0 mL/min/1.73   Magnesium    Collection Time: 10/11/23  9:56 PM    Specimen: Blood   Result Value Ref Range    Magnesium 1.3 (L) 1.6 - 2.4 mg/dL   High Sensitivity Troponin T    Collection Time: 10/11/23  9:56 PM    Specimen: Blood   Result Value Ref Range    HS Troponin T 678 (C) <15 ng/L   Lactic Acid, Plasma    Collection Time: 10/11/23  9:56 PM    Specimen: Blood   Result Value Ref Range    Lactate 1.8 0.5 - 2.0 mmol/L   Procalcitonin    Collection Time: 10/11/23  9:56 PM    Specimen: Blood   Result Value Ref Range    Procalcitonin 10.60 (H) 0.00 - 0.25 ng/mL   CBC Auto Differential    Collection Time: 10/11/23  9:56 PM    Specimen: Blood   Result Value Ref Range    WBC 18.69 (H) 3.40 - 10.80 10*3/mm3    RBC 3.05 (L) 4.14 - 5.80 10*6/mm3    Hemoglobin 8.3 (L) 13.0 - 17.7 g/dL    Hematocrit 26.2 (L) 37.5 - 51.0 %    MCV 85.9 79.0 - 97.0 fL    MCH 27.2 26.6 - 33.0 pg    MCHC 31.7 31.5 - 35.7 g/dL    RDW 15.0 12.3 - 15.4 %    RDW-SD 47.5 37.0 - 54.0 fl    MPV 9.8 6.0 - 12.0 fL    Platelets 379 140 - 450 10*3/mm3    Neutrophil % 76.7 (H) 42.7 - 76.0 %    Lymphocyte % 13.4 (L) 19.6 - 45.3 %    Monocyte % 4.8 (L) 5.0 - 12.0 %    Eosinophil % 0.6 0.3 - 6.2 %    Basophil % 0.4 0.0 - 1.5 %    Immature Grans % 4.1 (H) 0.0 - 0.5 %    Neutrophils, Absolute 14.33 (H) 1.70 - 7.00 10*3/mm3    Lymphocytes, Absolute 2.50 0.70 - 3.10 10*3/mm3    Monocytes, Absolute 0.90 0.10 - 0.90 10*3/mm3    Eosinophils, Absolute 0.12 0.00 - 0.40 10*3/mm3    Basophils, Absolute 0.07 0.00 - 0.20 10*3/mm3    Immature Grans, Absolute 0.77 (H) 0.00 - 0.05 10*3/mm3    nRBC 0.3 (H) 0.0 - 0.2 /100 WBC   Urinalysis With  Microscopic If Indicated (No Culture) - Urine, Catheter    Collection Time: 10/11/23 10:06 PM    Specimen: Urine, Catheter   Result Value Ref Range    Color, UA Yellow Yellow, Straw    Appearance, UA Clear Clear    pH, UA 7.0 5.0 - 8.0    Specific Gravity, UA 1.008 1.005 - 1.030    Glucose, UA Negative Negative    Ketones, UA Negative Negative    Bilirubin, UA Negative Negative    Blood, UA Negative Negative    Protein,  mg/dL (2+) (A) Negative    Leuk Esterase, UA Negative Negative    Nitrite, UA Negative Negative    Urobilinogen, UA 0.2 E.U./dL 0.2 - 1.0 E.U./dL   Urinalysis, Microscopic Only - Urine, Catheter    Collection Time: 10/11/23 10:06 PM    Specimen: Urine, Catheter   Result Value Ref Range    RBC, UA None Seen None Seen, 0-2 /HPF    WBC, UA 6-12 (A) None Seen, 0-2 /HPF    Bacteria, UA Trace (A) None Seen /HPF    Squamous Epithelial Cells, UA 0-2 None Seen, 0-2 /HPF    Hyaline Casts, UA None Seen None Seen /LPF    Methodology Manual Light Microscopy    Respiratory Panel PCR w/COVID-19(SARS-CoV-2) EL/BARNEY/DYLAN/PAD/COR/MAD/NIGEL In-House, NP Swab in UTM/VTM, 3-4 HR TAT - Swab, Nasopharynx    Collection Time: 10/11/23 10:35 PM    Specimen: Nasopharynx; Swab   Result Value Ref Range    ADENOVIRUS, PCR Not Detected Not Detected    Coronavirus 229E Not Detected Not Detected    Coronavirus HKU1 Not Detected Not Detected    Coronavirus NL63 Not Detected Not Detected    Coronavirus OC43 Not Detected Not Detected    COVID19 Not Detected Not Detected - Ref. Range    Human Metapneumovirus Not Detected Not Detected    Human Rhinovirus/Enterovirus Not Detected Not Detected    Influenza A PCR Not Detected Not Detected    Influenza B PCR Not Detected Not Detected    Parainfluenza Virus 1 Not Detected Not Detected    Parainfluenza Virus 2 Not Detected Not Detected    Parainfluenza Virus 3 Not Detected Not Detected    Parainfluenza Virus 4 Not Detected Not Detected    RSV, PCR Not Detected Not Detected    Bordetella  pertussis pcr Not Detected Not Detected    Bordetella parapertussis PCR Not Detected Not Detected    Chlamydophila pneumoniae PCR Not Detected Not Detected    Mycoplasma pneumo by PCR Not Detected Not Detected   High Sensitivity Troponin T 2Hr    Collection Time: 10/12/23 12:37 AM    Specimen: Blood   Result Value Ref Range    HS Troponin T 675 (C) <15 ng/L    Troponin T Delta -3 >=-4 - <+4 ng/L   POC Glucose Once    Collection Time: 10/12/23 12:56 AM    Specimen: Blood   Result Value Ref Range    Glucose 99 70 - 130 mg/dL   MRSA Screen, PCR (Inpatient) - Swab, Nares    Collection Time: 10/12/23  1:59 AM    Specimen: Nares; Swab   Result Value Ref Range    MRSA PCR No MRSA Detected No MRSA Detected   CBC (No Diff)    Collection Time: 10/12/23  2:51 AM    Specimen: Blood   Result Value Ref Range    WBC 22.31 (H) 3.40 - 10.80 10*3/mm3    RBC 3.16 (L) 4.14 - 5.80 10*6/mm3    Hemoglobin 8.7 (L) 13.0 - 17.7 g/dL    Hematocrit 27.1 (L) 37.5 - 51.0 %    MCV 85.8 79.0 - 97.0 fL    MCH 27.5 26.6 - 33.0 pg    MCHC 32.1 31.5 - 35.7 g/dL    RDW 14.9 12.3 - 15.4 %    RDW-SD 46.1 37.0 - 54.0 fl    MPV 9.4 6.0 - 12.0 fL    Platelets 334 140 - 450 10*3/mm3   Comprehensive Metabolic Panel    Collection Time: 10/12/23  2:51 AM    Specimen: Blood   Result Value Ref Range    Glucose 127 (H) 65 - 99 mg/dL    BUN 13 8 - 23 mg/dL    Creatinine 2.51 (H) 0.76 - 1.27 mg/dL    Sodium 135 (L) 136 - 145 mmol/L    Potassium 3.5 3.5 - 5.2 mmol/L    Chloride 97 (L) 98 - 107 mmol/L    CO2 26.0 22.0 - 29.0 mmol/L    Calcium 8.5 (L) 8.6 - 10.5 mg/dL    Total Protein 5.4 (L) 6.0 - 8.5 g/dL    Albumin 2.7 (L) 3.5 - 5.2 g/dL    ALT (SGPT) 36 1 - 41 U/L    AST (SGOT) 235 (H) 1 - 40 U/L    Alkaline Phosphatase 528 (H) 39 - 117 U/L    Total Bilirubin 1.1 0.0 - 1.2 mg/dL    Globulin 2.7 gm/dL    A/G Ratio 1.0 g/dL    BUN/Creatinine Ratio 5.2 (L) 7.0 - 25.0    Anion Gap 12.0 5.0 - 15.0 mmol/L    eGFR 28.2 (L) >60.0 mL/min/1.73   Calcium, Ionized     Collection Time: 10/12/23  5:23 AM    Specimen: Blood   Result Value Ref Range    Ionized Calcium 1.07 (L) 1.15 - 1.35 mmol/L    Ionized Calcium 4.3 (L) 4.6 - 5.4 mg/dL   Lactic Acid, Plasma    Collection Time: 10/12/23  5:24 AM    Specimen: Blood   Result Value Ref Range    Lactate 1.3 0.5 - 2.0 mmol/L   POC Glucose Once    Collection Time: 10/12/23  5:43 AM    Specimen: Blood   Result Value Ref Range    Glucose 128 70 - 130 mg/dL       Ordered the above labs and reviewed the results.        RADIOLOGY  XR Chest 1 View    Result Date: 10/11/2023  EMERGENCY PORTABLE VIEW OF THE CHEST ON 10/11/2023  CLINICAL HISTORY: Altered mental status.  COMPARISON: There are no prior chest x-rays for comparison.  FINDINGS: There is a right internal jugular HemoCath in place. Its distal tip projects over the superior aspect of the right atrium. The cardiomediastinal silhouette is upper limits of normal. The pulmonary vasculature is within normal limits. The lung volumes are low with horizontal platelike atelectasis at the lung bases. The remainder of the lungs are clear. Costophrenic angles are sharp.      1. No definite active disease is seen in the chest. Lung volumes are low with horizontal platelike atelectasis at the lung bases. There is a right internal jugular HemoCath in place with its tip in the superior aspect of the right atrium.      CT Head Without Contrast    Result Date: 10/11/2023  EMERGENCY CT SCAN OF THE HEAD WITHOUT CONTRAST ON 10/11/2023  CLINICAL HISTORY: Altered mental status and confusion  TECHNIQUE: Spiral CT images were obtained from the base of the skull to the vertex without intravenous contrast. The images were reformatted and are submitted in 3 mm thick axial, sagittal and coronal CT sections with brain algorithm.  COMPARISON: There are no prior head CTs for comparison.  FINDINGS: There is some mild patchy low-density in the frontal periventricular white matter consistent with mild small vessel disease.  The remainder of the brain parenchyma is normal in attenuation. There is mild diffuse cerebral atrophy. The ventricles are normal in size. I see no focal mass effect. There is no midline shift. No extra axial fluid collections are identified. There is no evidence of acute intracranial hemorrhage. The calvarium and the skull base are normal in appearance. The paranasal sinuses and the mastoid air cells and the middle ear cavities are clear.      1. No acute intracranial abnormality is identified. There is mild small vessel disease in the periventricular white matter of the cerebral hemispheres. There is mild diffuse cerebral atrophy. The remainder of the head CT is normal. The etiology of the patient's confusion and altered mental status is not established on this exam.  Radiation dose reduction techniques were utilized, including automated exposure control and exposure modulation based on body size.   This report was finalized on 10/11/2023 11:23 PM by Dr. Jeff Vargas M.D on Workstation: [employer]       Ordered the above noted radiological studies. Reviewed by me in PACS.          PROCEDURES  Critical Care    Performed by: Charlie Avitia III, PA  Authorized by: Douglas Case MD    Critical care provider statement:     Critical care time (minutes):  30    Critical care was necessary to treat or prevent imminent or life-threatening deterioration of the following conditions:  Sepsis    Critical care was time spent personally by me on the following activities:  Blood draw for specimens, development of treatment plan with patient or surrogate, discussions with consultants, evaluation of patient's response to treatment, obtaining history from patient or surrogate, examination of patient, ordering and performing treatments and interventions, ordering and review of laboratory studies, ordering and review of radiographic studies, pulse oximetry, re-evaluation of patient's condition and vascular access  procedures (There were no charts in epic to review at the time of my assessment)    I assumed direction of critical care for this patient from another provider in my specialty: yes              MEDICATIONS GIVEN IN ER  Medications   Vancomycin Pharmacy Intermittent/Pulse Dosing (has no administration in time range)   heparin (porcine) injection 5,000 Units (5,000 Units Intracatheter Given 10/12/23 0524)   cefepime 2 gm IVPB in 100 ml NS (VTB) (0 mg Intravenous Stopped 10/11/23 2302)   vancomycin 1750 mg/500 mL 0.9% NS IVPB ([facility]) (1,750 mg Intravenous New Bag 10/11/23 2318)   potassium chloride 10 mEq in 100 mL IVPB (0 mEq Intravenous Stopped 10/12/23 0039)   Hydrocortisone Sod Suc (PF) (Solu-CORTEF) injection 200 mg (200 mg Intravenous Given 10/12/23 0019)   Patient was also given a liter of saline prior to placing on Levophed.  Patient was a dialysis patient and the second liter of fluid was not given due to concerns for fluid overload.      MEDICAL DECISION MAKING, PROGRESS, and CONSULTS    Discussion below represents my analysis of pertinent findings related to patient's condition, differential diagnosis, treatment plan and final disposition.      Orders placed during this visit:  Orders Placed This Encounter   Procedures    Critical Care    Blood Culture - Blood,    Blood Culture - Blood,    Respiratory Panel PCR w/COVID-19(SARS-CoV-2) EL/BARNEY/DYLAN/PAD/COR/MAD/NIGEL In-House, NP Swab in UTM/VTM, 3-4 HR TAT - Swab, Nasopharynx    Blood Culture - Blood, Blood, Central Line    MRSA Screen, PCR (Inpatient) - Swab, Nares    Urine Culture - Urine,    XR Chest 1 View    CT Head Without Contrast    Comprehensive Metabolic Panel    Urinalysis With Microscopic If Indicated (No Culture) - Urine, Catheter    Magnesium    High Sensitivity Troponin T    Lactic Acid, Plasma    Procalcitonin    CBC Auto Differential    High Sensitivity Troponin T 2Hr    Urinalysis, Microscopic Only - Urine, Clean Catch    CBC (No Diff)    Calcium,  Ionized    Comprehensive Metabolic Panel    Lactic Acid, Plasma    Vancomycin, Random    NPO Diet NPO Type: Strict NPO    Obtain medical records    Vital Signs Every Hour and Per Hospital Policy Based on Patient Condition    Telemetry - Place Orders & Notify Provider of Results When Patient Experiences Acute Chest Pain, Dysrhythmia or Respiratory Distress    Continuous Pulse Oximetry    Height & Weight    Daily Weights    Intake & Output    Oral Care - Patient Not on NPPV & Not Intubated    Target Arousal Level RASS 0 to -1    Use Mobility Guidelines for Advancement of Activity    ICU / CCU - Maintain IV Access    ICU / CCU - Place Order Consult Intensivist For Critical Care Management (If Patient Not Admitted to Cardiology for Primary Cardiology Condition)    ICU / CCU - Notify All Physicians When Patient is Transferred    As patient's mental status improves he can have a bedside swallow and if he passes can have a clear liquid diet  Physician Communication Order    Initiation Sequence for Vasoactive Infusions    Weaning Sequence for Vasoactive Infusions    Code Status and Medical Interventions:    Pulmonology (on-call MD unless specified)    Inpatient Infectious Diseases Consult    Inpatient Cardiology Consult    POC Glucose Once    POC Glucose Q4H    POC Glucose Once    ECG 12 Lead Altered Mental Status    ECG 12 Lead Tachycardia    Adult Transthoracic Echo Complete W/ Cont if Necessary Per Protocol    Wound Ostomy Eval & Treat    Insert Peripheral IV    Insert Peripheral IV    Inpatient Admission    CBC & Differential         Additional sources:  - Discussed/obtained information from independent historians:   EMS gives some history  Additional information was obtained to confirm the patient's history.    - External (non-ED) record review: No records to review            - Chronic or social conditions impacting care: Patient is a poor historian        Differential diagnosis:    Sepsis secondary to UTI, sepsis  secondary to PNA, unknown source of septicemia/bacteremia.  Will initiate septic work-up.    Independent interpretation of labs, radiology studies, and discussions with consultants:  ED Course as of 10/12/23 0712   Wed Oct 11, 2023   2151 Temp: 100.4 °F (38 °C) [RC]   2154 BP(!): 89/53 [RC]   2257 Patient has not responded to IV fluids.  He has been given a liter.  He is a dialysis patient.  We will start on Levophed.  Cefepime and vancomycin have already been initiated. [RC]   2257 EKG interpreted by me demonstrates sinus rhythm, rate of 96, no OK prolongation, no ST elevation [MW]   2258 CT head without contrast shows no acute process. [RC]   2315 HS Troponin T(!!): 678 [MW]   2316 Procalcitonin(!): 10.60 [RC]   2329 Discussed patient's case with Dr. Wilcox.  To admit his care in the ICU.  Working diagnosis will be sepsis of an unknown origin [RC]      ED Course User Index  [MW] Douglas Case MD  [RC] Charlie Avitia III, PA               DIAGNOSIS  Final diagnoses:   Septic shock   Hypotension, unspecified hypotension type         DISPOSITION  ADMISSION    Discussed treatment plan and reason for admission with pt/family and admitting physician.  Pt/family voiced understanding of the plan for admission for further testing/treatment as needed.        Latest Documented Vital Signs:  As of 07:12 EDT  BP- 138/79 HR- 73 Temp- 98.6 °F (37 °C) (Rectal) O2 sat- 95%      --    Please note that portions of this were completed with a voice recognition program.       Note Disclaimer: At [facility], we believe that sharing information builds trust and better relationships. You are receiving this note because you are receiving care at [facility] or recently visited. It is possible you will see health information before a provider has talked with you about it. This kind of information can be easy to misunderstand. To help you fully understand what it means for your health, we urge you to discuss this note  with your provider.         Charlie Avitia III, PA  10/12/23 0712      Electronically signed by Charlie Avitia III, PA at 10/12/23 0712       Olamide Lipscomb, RN at 10/11/23 2143          Per EMS family came to check on pt this morning and pt was AxO x 1, normally AxO x 4. Pt received hemodialysis today and was HoTN after.  Pt presents to the ER with garbled speech and lethragic.    Electronically signed by Olamide Lipscomb RN at 10/11/23 2147       Vital Signs (last day)       Date/Time Temp Temp src Pulse Resp BP Patient Position SpO2    10/12/23 1245 -- -- 76 -- 144/80 -- 96    10/12/23 1230 -- -- 75 -- 139/79 -- 96    10/12/23 1215 -- -- 74 -- 149/81 -- 96    10/12/23 1200 -- -- 80 18 157/95 Lying 98    10/12/23 1130 -- -- 79 -- 150/84 -- 95    10/12/23 1115 97.7 (36.5) Oral 76 -- 158/85 -- 94    10/12/23 1100 -- -- 80 -- 162/88 -- 93    10/12/23 1045 -- -- 78 -- 153/85 -- 95    10/12/23 1030 -- -- 73 -- 143/88 -- 94    10/12/23 1000 -- -- 75 -- 152/80 -- 96    10/12/23 0945 -- -- 75 -- 151/82 -- 96    10/12/23 0900 -- -- 76 -- 145/79 -- 97    10/12/23 0800 -- -- 75 18 131/78 Lying 96    10/12/23 0700 96.8 (36) Oral 73 20 138/79 -- 95    10/12/23 0600 -- -- 75 18 132/72 -- 96    10/12/23 0500 -- -- 75 18 126/74 -- 96    10/12/23 0445 -- -- 75 18 138/79 -- 96    10/12/23 0430 -- -- 78 18 136/71 -- 95    10/12/23 0415 -- -- 81 -- 116/71 -- 94    10/12/23 0400 98.6 (37) Rectal 83 16 121/79 -- 94    10/12/23 0345 -- -- 80 18 123/74 -- 93    10/12/23 0330 -- -- 93 18 140/80 -- 93    10/12/23 0315 -- -- 89 -- 118/72 -- 94    10/12/23 0300 -- -- 86 -- 126/71 -- 93    10/12/23 0245 -- -- 90 -- 109/64 -- 94    10/12/23 0230 -- -- 95 -- 157/71 -- 93    10/12/23 0215 -- -- 92 -- 131/82 -- 95    10/12/23 0200 -- -- 92 -- 132/80 -- 93    10/12/23 0115 -- -- 90 -- 96/48 -- 94    10/12/23 0100 -- -- 93 -- -- -- 92    10/12/23 0021 -- -- 92 -- 99/56 -- 94    10/12/23 0011 -- -- 98 -- 113/68 -- 95    10/12/23 0001 --  -- 89 -- 102/56 -- 94    10/11/23 2351 -- -- 91 -- 99/75 -- 93    10/11/23 2320 -- -- 92 -- -- -- 96    10/11/23 2316 -- -- 90 -- -- -- 95    10/11/23 2315 -- -- 97 -- 81/48 -- 94    10/11/23 23:02:42 -- -- 94 -- 82/51 -- 90    10/11/23 2233 -- -- 93 -- -- -- --    10/11/23 2231 -- -- 92 -- 85/51 -- 92    10/11/23 2229 -- -- 89 -- -- -- 91    10/11/23 2225 -- -- 96 -- 88/54 -- 95    10/11/23 2201 -- -- 94 -- 101/60 -- 93    10/11/23 2153 -- -- -- 16 -- -- --    10/11/23 2147 100.4 (38) -- 93 -- 89/53 -- 92          Oxygen Therapy (last day)       Date/Time SpO2 Device (Oxygen Therapy) Flow (L/min) Oxygen Concentration (%) ETCO2 (mmHg)    10/12/23 1245 96 -- -- -- --    10/12/23 1230 96 -- -- -- --    10/12/23 1215 96 -- -- -- --    10/12/23 1200 98 -- -- -- --    10/12/23 1130 95 -- -- -- --    10/12/23 1115 94 -- -- -- --    10/12/23 1100 93 -- -- -- --    10/12/23 1045 95 -- -- -- --    10/12/23 1030 94 -- -- -- --    10/12/23 1000 96 -- -- -- --    10/12/23 0945 96 -- -- -- --    10/12/23 0900 97 -- -- -- --    10/12/23 0800 96 room air -- -- --    10/12/23 0700 95 -- -- -- --    10/12/23 0600 96 -- -- -- --    10/12/23 0500 96 -- -- -- --    10/12/23 0445 96 -- -- -- --    10/12/23 0430 95 -- -- -- --    10/12/23 0415 94 -- -- -- --    10/12/23 0400 94 -- -- -- --    10/12/23 0345 93 -- -- -- --    10/12/23 0330 93 -- -- -- --    10/12/23 0315 94 -- -- -- --    10/12/23 0300 93 -- -- -- --    10/12/23 0245 94 -- -- -- --    10/12/23 0230 93 -- -- -- --    10/12/23 0215 95 -- -- -- --    10/12/23 0200 93 -- -- -- --    10/12/23 0115 94 -- -- -- --    10/12/23 0102 -- room air -- -- --    10/12/23 0100 92 -- -- -- --    10/12/23 0021 94 -- -- -- --    10/12/23 0011 95 -- -- -- --    10/12/23 0001 94 -- -- -- --    10/11/23 2351 93 -- -- -- --    10/11/23 2320 96 -- -- -- --    10/11/23 2316 95 -- -- -- --    10/11/23 2315 94 -- -- -- --    10/11/23 23:02:42 90 -- -- -- --    10/11/23 2231 92 -- -- -- --     10/11/23 2229 91 -- -- -- --    10/11/23 2225 95 -- -- -- --    10/11/23 2201 93 -- -- -- --    10/11/23 2147 92 room air -- -- --          Lines, Drains & Airways       Active LDAs       Name Placement date Placement time Site Days    Peripheral IV Anterior;Left Forearm --  --  Forearm  --    Peripheral IV 10/11/23 2200 Anterior;Distal;Right Forearm 10/11/23  2200  Forearm  less than 1    Hemodialysis Cath Double --  --  Internal Jugular  --              Inactive LDAs       None                  Facility-Administered Medications as of 10/12/2023   Medication Dose Route Frequency Provider Last Rate Last Admin    acetaminophen (TYLENOL) tablet 650 mg  650 mg Oral Q4H PRN Jesus Wilcox Jr., MD        Or    acetaminophen (TYLENOL) suppository 650 mg  650 mg Rectal Q6H PRN Jesus Wilcox Jr., MD        sennosides-docusate (PERICOLACE) 8.6-50 MG per tablet 2 tablet  2 tablet Oral BID Jesus Wilcox Jr., MD        And    polyethylene glycol (MIRALAX) packet 17 g  17 g Oral Daily PRN Jesus Wilcox Jr., MD        And    bisacodyl (DULCOLAX) EC tablet 5 mg  5 mg Oral Daily PRN Jesus Wilcox Jr., MD        And    bisacodyl (DULCOLAX) suppository 10 mg  10 mg Rectal Daily PRN Jesus Wilcox Jr., MD        [COMPLETED] cefepime 2 gm IVPB in 100 ml NS (VTB)  2,000 mg Intravenous Once Charlie Avitia III, PA   Stopped at 10/11/23 2302    cefepime 2 gm IVPB in 100 ml NS (VTB)  2,000 mg Intravenous Q12H Jesus Wilcox Jr., MD   2,000 mg at 10/12/23 1042    dextrose (D50W) (25 g/50 mL) IV injection 25 g  25 g Intravenous Q15 Min PRN Jesus Wilcox Jr., MD        dextrose (GLUTOSE) oral gel 15 g  15 g Oral Q15 Min PRN Jesus Wilcox Jr., MD        EPINEPHrine 5 mg in 250 mL NS infusion  0.02-0.3 mcg/kg/min Intravenous Titrated Jesus Wilcox Jr., MD        famotidine (PEPCID) injection 20 mg  20 mg Intravenous Daily Hermann Quiroga MD        glucagon (GLUCAGEN) injection 1 mg   1 mg Intramuscular Q15 Min PRN Jesus Wilcox Jr., MD        heparin (porcine) 5000 UNIT/ML injection 5,000 Units  5,000 Units Subcutaneous Q12H Jesus Wilcox Jr., MD   5,000 Units at 10/12/23 0833    heparin (porcine) injection 5,000 Units  5,000 Units Intracatheter PRN Jesus Wilcox Jr., MD   5,000 Units at 10/12/23 0524    Hydrocortisone Sod Suc (PF) (Solu-CORTEF) injection 100 mg  100 mg Intravenous Q8H Jesus Wilcox Jr., MD   100 mg at 10/12/23 0833    [COMPLETED] Hydrocortisone Sod Suc (PF) (Solu-CORTEF) injection 200 mg  200 mg Intravenous Once Jesus Wilcox Jr., MD   200 mg at 10/12/23 0019    insulin regular (humuLIN R,novoLIN R) injection 4-24 Units  4-24 Units Subcutaneous Q4H Jesus Wilcox Jr., MD        magnesium sulfate in D5W 1g/100mL (PREMIX)  1 g Intravenous Q1H Antoine Hay MD        nitroglycerin (NITROSTAT) SL tablet 0.4 mg  0.4 mg Sublingual Q5 Min PRN Jesus Wilcox Jr., MD        norepinephrine (LEVOPHED) 8 mg in 250 mL NS infusion (premix)  0.02-0.3 mcg/kg/min Intravenous Titrated Charlie Avitia III, PA   Stopped at 10/12/23 0400    ondansetron (ZOFRAN) injection 4 mg  4 mg Intravenous Q6H PRN Jesus Wilcox Jr., MD        Pharmacy to dose vancomycin   Does not apply Continuous PRN Jesus Wilcox Jr., MD        [COMPLETED] potassium chloride 10 mEq in 100 mL IVPB  10 mEq Intravenous Once Charlie Avitia III, PA   Stopped at 10/12/23 0039    sodium chloride 0.9 % flush 10 mL  10 mL Intravenous PRN Charlie Avitia III, PA        sodium chloride 0.9 % flush 10 mL  10 mL Intravenous Q12H Jesus Wilcox Jr., MD   10 mL at 10/12/23 0833    sodium chloride 0.9 % flush 10 mL  10 mL Intravenous PRN Jesus Wilcox Jr., MD        sodium chloride 0.9 % infusion 40 mL  40 mL Intravenous PRN Jesus Wilcox Jr., MD        sodium chloride 0.9 % infusion  100 mL/hr Intravenous Continuous Jesus Wilcox Jr.,   mL/hr at 10/12/23 0700 100 mL/hr at 10/12/23 0700    [COMPLETED] vancomycin 1750 mg/500 mL 0.9% NS IVPB ([facility])  20 mg/kg Intravenous Once Charlie Avitia III, PA   1,750 mg at 10/11/23 2318    vancomycin 750 mg in sodium chloride 0.9 % 250 mL IVPB-VTB  750 mg Intravenous Once Hermann Quiroga MD        Vancomycin Pharmacy Intermittent/Pulse Dosing   Does not apply Daily Jesus Wilcox Jr., MD        vasopressin (PITRESSIN) 20 units in 100 mL NS infusion  0.03 Units/min Intravenous Continuous Jesus Wilcox Jr., MD         Orders (last 24 hrs)        Start     Ordered    10/13/23 0600  Daily CHG Bath While Central Line in Place  Daily       10/12/23 0916    10/13/23 0600  CBC & Differential  Morning Draw         10/12/23 1034    10/13/23 0600  Comprehensive Metabolic Panel  Morning Draw         10/12/23 1034    10/13/23 0600  Vancomycin, Random  Morning Draw         10/12/23 1301    10/13/23 0600  Magnesium  Morning Draw         10/12/23 1309    10/13/23 0600  Phosphorus  Morning Draw         10/12/23 1309    10/13/23 0500  ECG 12 Lead Tachycardia  Tomorrow AM         10/12/23 0118    10/13/23 0430  Magnesium  Morning Draw         10/12/23 1355    10/13/23 0430  Phosphorus  Morning Draw         10/12/23 1355    10/13/23 0430  Comprehensive Metabolic Panel  Morning Draw         10/12/23 1355    10/13/23 0430  CBC & Differential  Morning Draw         10/12/23 1355    10/13/23 0430  Uric Acid  Morning Draw         10/12/23 1355    10/12/23 1800  Bladder Scan  Every 6 Hours        Comments: Call Dr. Hay if it is greater than 350    10/12/23 1355    10/12/23 1445  magnesium sulfate in D5W 1g/100mL (PREMIX)  Every 1 Hour         10/12/23 1355    10/12/23 1400  vancomycin 750 mg in sodium chloride 0.9 % 250 mL IVPB-VTB  Once         10/12/23 1301    10/12/23 1400  famotidine (PEPCID) injection 20 mg  Daily         10/12/23 1305    10/12/23 1356  Sodium, Urine, Random -  Once         10/12/23 4755     10/12/23 1356  Protein / Creatinine Ratio, Urine -  Once         10/12/23 1355    10/12/23 1356  Chloride, Urine, Random -  Once         10/12/23 1355    10/12/23 1311  Ammonia  Daily       10/12/23 1310    10/12/23 1306  US Liver  1 Time Imaging         10/12/23 1306    10/12/23 1200  Vancomycin, Random  Timed         10/12/23 0126    10/12/23 1157  POC Glucose Once  PROCEDURE ONCE        Comments: Complete no more than 45 minutes prior to patient eating      10/12/23 1152    10/12/23 1043  Inpatient Nephrology Consult  Once        Specialty:  Nephrology  Provider:  Antoine Hay MD    10/12/23 1042    10/12/23 1015  cefepime 2 gm IVPB in 100 ml NS (VTB)  Every 12 Hours        Note to Pharmacy: Please adjust dose as needed for renal function etc.    10/12/23 0118    10/12/23 0916  Connectors / Hubs Must Be Scrubbed 15 Seconds Using 70% Alcohol Before Access - Allow to Dry Before Accessing Line  Continuous         10/12/23 0916    10/12/23 0916  Change CHG Dressing or Transparent Dressing with CHG Disk and Needleless Connectors Every 7 Days  Weekly        Comments: If Not Done By Dialysis RN  Per [facility] Policy    10/12/23 0916    10/12/23 0900  sennosides-docusate (PERICOLACE) 8.6-50 MG per tablet 2 tablet  2 Times Daily        See Hyperspace for full Linked Orders Report.    10/12/23 0118    10/12/23 0900  Vancomycin Pharmacy Intermittent/Pulse Dosing  Daily         10/12/23 0126    10/12/23 0800  Hydrocortisone Sod Suc (PF) (Solu-CORTEF) injection 100 mg  Every 8 Hours         10/12/23 0120    10/12/23 0749  POC Glucose Once  PROCEDURE ONCE        Comments: Complete no more than 45 minutes prior to patient eating      10/12/23 0747    10/12/23 0711  Urine Culture - Urine, Urine, Catheter  Once        Comments: Please ensure 'Use Existing Specimen' is selected if this order is for urine culture add-on.  If no button appears, please contact the lab for assistance.      10/12/23 0710    10/12/23 0710  Critical  Care  Once        Comments: This order was created via procedure documentation    10/12/23 0709    10/12/23 0702  Inpatient Cardiology Consult  IN AM        Specialty:  Cardiology  Provider:  Andrew Nguyen MD    10/12/23 0118    10/12/23 0630  Initiation Sequence for Vasoactive Infusions  Every Shift Nursing      Comments: Initiation Sequence for Vasoactive Infusions    10/12/23 0118    10/12/23 0630  Weaning Sequence for Vasoactive Infusions  Every Shift Nursing      Comments: Weaning Sequence for Vasoactive Infusions    10/12/23 0118    10/12/23 0612  Wound Ostomy Eval & Treat  Once         10/12/23 0612    10/12/23 0600  CBC (No Diff)  Morning Draw         10/12/23 0118    10/12/23 0600  Calcium, Ionized  Morning Draw         10/12/23 0118    10/12/23 0600  Comprehensive Metabolic Panel  Morning Draw         10/12/23 0118    10/12/23 0600  Lactic Acid, Plasma  Morning Draw         10/12/23 0118    10/12/23 0546  POC Glucose Once  PROCEDURE ONCE        Comments: Complete no more than 45 minutes prior to patient eating      10/12/23 0543    10/12/23 0531  Urinalysis With Culture If Indicated - Urine, Catheter  Once,   Status:  Canceled        Comments: Please ensure 'Use Existing Specimen' is selected if this order is for urine culture add-on.  If no button appears, please contact the lab for assistance.      10/12/23 0534    10/12/23 0442  heparin (porcine) injection 5,000 Units  As Needed         10/12/23 0442    10/12/23 0400  POC Glucose Q4H  Every 4 Hours      Comments: Complete no more than 45 minutes prior to patient eating      10/12/23 0118    10/12/23 0215  sodium chloride 0.9 % flush 10 mL  Every 12 Hours Scheduled         10/12/23 0118    10/12/23 0215  heparin (porcine) 5000 UNIT/ML injection 5,000 Units  Every 12 Hours Scheduled         10/12/23 0118    10/12/23 0215  insulin regular (humuLIN R,novoLIN R) injection 4-24 Units  Every 4 Hours         10/12/23 0118    10/12/23 0215  sodium  chloride 0.9 % infusion  Continuous         10/12/23 0118    10/12/23 0215  vasopressin (PITRESSIN) 20 units in 100 mL NS infusion  Continuous         10/12/23 0118    10/12/23 0215  EPINEPHrine 5 mg in 250 mL NS infusion  Titrated         10/12/23 0118    10/12/23 0215  cefepime 2 gm IVPB in 100 ml NS (VTB)  Once,   Status:  Discontinued         10/12/23 0118    10/12/23 0200  Vital Signs Every Hour and Per Hospital Policy Based on Patient Condition  Every Hour       10/12/23 0118    10/12/23 0200  Intake & Output  Every Hour       10/12/23 0118    10/12/23 0122  I attest that I reassessed tissue perfusion after fluid resuscitation was completed  Once        Comments: I attest that I reassessed tissue perfusion after fluid resuscitation was completed    10/12/23 0122    10/12/23 0119  Daily Weights  Daily       10/12/23 0118    10/12/23 0117  Pharmacy to dose vancomycin  Continuous PRN         10/12/23 0118    10/12/23 0117  MRSA Screen, PCR (Inpatient) - Swab, Nares  Once         10/12/23 0118    10/12/23 0115  ondansetron (ZOFRAN) injection 4 mg  Every 6 Hours PRN         10/12/23 0118    10/12/23 0115  acetaminophen (TYLENOL) tablet 650 mg  Every 4 Hours PRN        See Hyperspace for full Linked Orders Report.    10/12/23 0118    10/12/23 0115  acetaminophen (TYLENOL) suppository 650 mg  Every 6 Hours PRN        See Hyperspace for full Linked Orders Report.    10/12/23 0118    10/12/23 0113  Blood Culture - Blood, Blood, Central Line  Once        Comments: Please access and get culture from his dialysis catheter      10/12/23 0118    10/12/23 0113  Urinalysis With Culture If Indicated - Urine, Clean Catch  Once,   Status:  Canceled         10/12/23 0118    10/12/23 0113  Adult Transthoracic Echo Complete W/ Cont if Necessary Per Protocol  Once        Comments: Patient with shock elevated troponin also sepsis of unclear etiology rule out endocarditis    10/12/23 0118    10/12/23 0111  As patient's mental status  improves he can have a bedside swallow and if he passes can have a clear liquid diet  Physician Communication Order  Continuous        Comments: As patient's mental status improves he can have a bedside swallow and if he passes can have a clear liquid diet    10/12/23 0118    10/12/23 0111  Inpatient Infectious Diseases Consult  Once        Specialty:  Infectious Diseases  Provider:  Regina Fleming MD    10/12/23 0118    10/12/23 0110  NPO Diet NPO Type: Strict NPO  Diet Effective Now         10/12/23 0118    10/12/23 0109  dextrose (D50W) (25 g/50 mL) IV injection 25 g  Every 15 Minutes PRN         10/12/23 0118    10/12/23 0109  glucagon (GLUCAGEN) injection 1 mg  Every 15 Minutes PRN         10/12/23 0118    10/12/23 0109  dextrose (GLUTOSE) oral gel 15 g  Every 15 Minutes PRN         10/12/23 0118    10/12/23 0109  Continuous Cardiac Monitoring  Continuous        Comments: Follow Standing Orders As Outlined in Process Instructions (Open Order Report to View Full Instructions)    10/12/23 0118    10/12/23 0109  Telemetry - Maintain IV Access  Continuous,   Status:  Canceled         10/12/23 0118    10/12/23 0109  Telemetry - Place Orders & Notify Provider of Results When Patient Experiences Acute Chest Pain, Dysrhythmia or Respiratory Distress  Until Discontinued         10/12/23 0118    10/12/23 0109  Continuous Pulse Oximetry  Continuous         10/12/23 0118    10/12/23 0109  Height & Weight  Once         10/12/23 0118    10/12/23 0109  Oral Care - Patient Not on NPPV & Not Intubated  Every Shift       10/12/23 0118    10/12/23 0109  Target Arousal Level RASS 0 to -1  Continuous         10/12/23 0118    10/12/23 0109  Use Mobility Guidelines for Advancement of Activity  Continuous         10/12/23 0118    10/12/23 0109  Insert Peripheral IV  Once         10/12/23 0118    10/12/23 0109  Saline Lock & Maintain IV Access  Continuous,   Status:  Canceled         10/12/23 0118    10/12/23 0109  Code Status and  Medical Interventions:  Continuous         10/12/23 0118    10/12/23 0109  ICU / CCU - Maintain IV Access  Continuous         10/12/23 0118    10/12/23 0109  ICU / CCU - Place Order Consult Intensivist For Critical Care Management (If Patient Not Admitted to Cardiology for Primary Cardiology Condition)  Continuous         10/12/23 0118    10/12/23 0109  ICU / CCU - Notify All Physicians When Patient is Transferred  Once         10/12/23 0118    10/12/23 0108  sodium chloride 0.9 % flush 10 mL  As Needed         10/12/23 0118    10/12/23 0108  sodium chloride 0.9 % infusion 40 mL  As Needed         10/12/23 0118    10/12/23 0108  polyethylene glycol (MIRALAX) packet 17 g  Daily PRN        See Hyperspace for full Linked Orders Report.    10/12/23 0118    10/12/23 0108  bisacodyl (DULCOLAX) EC tablet 5 mg  Daily PRN        See Hyperspace for full Linked Orders Report.    10/12/23 0118    10/12/23 0108  bisacodyl (DULCOLAX) suppository 10 mg  Daily PRN        See Hyperspace for full Linked Orders Report.    10/12/23 0118    10/12/23 0108  nitroglycerin (NITROSTAT) SL tablet 0.4 mg  Every 5 Minutes PRN         10/12/23 0118    10/12/23 0058  POC Glucose Once  PROCEDURE ONCE        Comments: Complete no more than 45 minutes prior to patient eating      10/12/23 0056    10/12/23 0021  Hydrocortisone Sod Suc (PF) (Solu-CORTEF) injection 200 mg  Once         10/12/23 0005    10/11/23 2356  High Sensitivity Troponin T 2Hr  PROCEDURE ONCE         10/11/23 2234    10/11/23 2340  Inpatient Admission  Once         10/11/23 2340    10/11/23 2335  Sepsis Fluid Exclusion: Concern For Fluid Overload; Bolus Volume Given / Ordered (mL): 1,000  Once         10/11/23 2334    10/11/23 2333  potassium chloride 10 mEq in 100 mL IVPB  Once         10/11/23 2317    10/11/23 2312  norepinephrine (LEVOPHED) 8 mg in 250 mL NS infusion (premix)  Titrated         10/11/23 2256    10/11/23 0235  Pulmonology (on-call MD unless specified)  Once         Specialty:  Pulmonary Disease  Provider:  (Not yet assigned)    10/11/23 2259    10/11/23 2242  Urinalysis, Microscopic Only - Urine, Catheter  Once         10/11/23 2241    10/11/23 2230  vancomycin 1750 mg/500 mL 0.9% NS IVPB ([facility])  Once         10/11/23 2153    10/11/23 2215  Respiratory Panel PCR w/COVID-19(SARS-CoV-2) [facility] In-House, NP Swab in UTM/VTM, 3-4 HR TAT - Swab, Nasopharynx  STAT         10/11/23 2215    10/11/23 2209  cefepime 2 gm IVPB in 100 ml NS (VTB)  Once         10/11/23 2153    10/11/23 2200  Obtain medical records  Once        Comments: Most recent [facility] admission.  Notify physician when available.    10/11/23 2159    10/11/23 2152  CT Head Without Contrast  1 Time Imaging         10/11/23 2153    10/11/23 2151  High Sensitivity Troponin T  Once         10/11/23 2151    10/11/23 2151  Insert Peripheral IV  Once        See Hyperspace for full Linked Orders Report.    10/11/23 2151    10/11/23 2151  Blood Culture - Blood, Hand, Right  Once         10/11/23 2151    10/11/23 2151  Blood Culture - Blood, Wrist, Left  Once         10/11/23 2151    10/11/23 2151  Lactic Acid, Plasma  Once         10/11/23 2151    10/11/23 2151  Procalcitonin  Once         10/11/23 2151    10/11/23 2150  sodium chloride 0.9 % flush 10 mL  As Needed        See Hyperspace for full Linked Orders Report.    10/11/23 2151    10/11/23 2150  ECG 12 Lead Altered Mental Status  Once         10/11/23 2151    10/11/23 2150  Cardiac Monitoring  Continuous,   Status:  Canceled        Comments: Follow Standing Orders As Outlined in Process Instructions (Open Order Report to View Full Instructions)    10/11/23 2151    10/11/23 2150  Pulse Oximetry, Continuous  Continuous,   Status:  Canceled         10/11/23 2151    10/11/23 2150  Magnesium  STAT         10/11/23 2151    10/11/23 2149  CBC & Differential  Once         10/11/23 2151    10/11/23 2149  Comprehensive Metabolic Panel  Once         10/11/23 2151     10/11/23 2149  Urinalysis With Microscopic If Indicated (No Culture) - Urine, Catheter  Once         10/11/23 2151    10/11/23 2149  XR Chest 1 View  1 Time Imaging         10/11/23 2151    10/11/23 2149  CBC Auto Differential  PROCEDURE ONCE         10/11/23 2151    Unscheduled  Follow Hypoglycemia Standing Orders For Blood Glucose <70 & Notify Provider of Treatment  As Needed      Comments: Follow Hypoglycemia Orders As Outlined in Process Instructions (Open Order Report to View Full Instructions)  Notify Provider Any Time Hypoglycemia Treatment is Administered    10/12/23 0118    Unscheduled  Change Dressing to IV Site As Needed When Damp, Loose or Soiled  As Needed       10/12/23 0916    Unscheduled  Change Needleless Connectors  As Needed      Comments: Change Needleless Connectors When:  - Administration Set Changed  - Dressing Changed  - Removed For Any Reason  - Residual Blood or Debris Within Connector  - Prior to Drawing Blood Cultures  - Contamination of Connector  - After Administration of Blood or Blood Components    10/12/23 0916                     Physician Progress Notes (last 24 hours)        Hermann Quiroga MD at 10/12/23 1301          Lethargic and mumbles responses.    Lungs clear.  Ext no edema.    Labs noted.    Hypotension - improved  Adrenal insufficiency - suspected  ? Sepsis  BURAK vs CKD  Metastatic melanoma  Chronic systemic steroids  DM2  Anemia  Acute hyponatremia  Hypomagnesemia      BP currently holding without pressors.  Continue stress dose steroids.  Appreciate ID input. Check usg Liver.  Appreciate nephrology input, discussed.  Note recent CT imaging at [facility]. Check ammonia.  SS for DM2.  Monitor Hb.    Spoke to his brother Dr. Eddi Cardozo.    Hermann Quiroga MD  10/12/23  13:08 EDT     Electronically signed by Hermann Quiroga MD at 10/12/23 1310          Consult Notes (last 24 hours)        Antoine Hay MD at 10/12/23 1337        Consult Orders    1. Inpatient Nephrology  Consult [[id number]] ordered by Hermann Quiroga MD at 10/12/23 1042                   [facility] Consult Note      Patient Name: Delvin Cardozo  : 1960  MRN: [id number]  Primary Care Physician:  Provider, No Known  Referring Physician: No ref. provider found  Date of admission: 10/11/2023    Subjective     Reason for Consult: Acute kidney injury    HPI:   Delvin Cardozo is a 63 y.o. male patient was admitted on 10/11/2023 with shock was felt may be septic shock.  He had prolonged hospitalization at [facility] subsequently was sent to [facility] he developed acute kidney injury requiring dialysis and he had a tunneled dialysis catheter.  The etiology thought to be a acute tubular injury associated with the multiple factors including he was on immune check therapy which at that time caused colitis he was treated with large dose steroids also he was given the facility so that would suggest he may had C. difficile that.  He has history of metastatic melanoma, history of hypertension, he has diabetes mellitus type 2.  At the SNF he was getting daily dialysis  His creatinine on admission yesterday was 2.3 this morning 2.5 and electrolyte within acceptable range  Bladder was scanned and he was feeling her she needs to void 345 cc.  His blood pressure is stable on no pressors at this time he is not getting any IV fluid  He denies chest pain or shortness of breath he had some discomfort when he take a deep breath in the right upper quadrant.  He has recurrent diarrhea, no nausea or vomiting present no abdominal pain he has lower extremity edema    Review of Systems:   14 point review of systems is otherwise negative except for mentioned above on HPI    Personal History     Past Medical History:   Diagnosis Date    BURAK (acute kidney injury)     Diabetes mellitus     Dialysis patient     new onset     Hypertension     Melanoma     Tumor     left flank       No past surgical history on  file.    Family History: family history is not on file.    Social History:      Home Medications:  Prior to Admission medications    Not on File       Allergies:  Allergies   Allergen Reactions    Kiwi Extract Unknown - Low Severity       Objective     Vitals:   Temp:  [96.8 °F (36 °C)-100.4 °F (38 °C)] 97.7 °F (36.5 °C)  Heart Rate:  [73-98] 76  Resp:  [16-20] 18  BP: ()/(48-95) 144/80    Intake/Output Summary (Last 24 hours) at 10/12/2023 1337  Last data filed at 10/12/2023 0700  Gross per 24 hour   Intake 2717.38 ml   Output --   Net 2717.38 ml       Physical Exam:   Constitutional: Awake, alert, no acute distress.  Chronically ill  HEENT: Sclera anicteric, no conjunctival injection, oral mucosa slightly  Neck: Supple, no thyromegaly, no lymphadenopathy, trachea at midline, no JVD, tunneled dialysis catheter in the right IJ with exit site below the right clavicle  Respiratory: Bilateral rhonchi, nonlabored respiration  Cardiovascular: RRR, no murmurs, no rubs or gallops, no carotid bruit  Gastrointestinal: Positive bowel sounds, abdomen is soft, nontender and nondistended  : Slightly palpable bladder  Musculoskeletal: 2+ lower extremity edema, no clubbing or cyanosis  Psychiatric: Appropriate affect, cooperative  Neurologic: Oriented x3, moving all extremities, normal speech and mental status  Skin: Warm and dry       Scheduled Meds:     cefepime, 2,000 mg, Intravenous, Q12H  famotidine, 20 mg, Intravenous, Daily  heparin (porcine), 5,000 Units, Subcutaneous, Q12H  hydrocortisone sodium succinate, 100 mg, Intravenous, Q8H  insulin regular, 4-24 Units, Subcutaneous, Q4H  senna-docusate sodium, 2 tablet, Oral, BID  sodium chloride, 10 mL, Intravenous, Q12H  vancomycin, 750 mg, Intravenous, Once  Vancomycin Pharmacy Intermittent/Pulse Dosing, , Does not apply, Daily      IV Meds:   EPINEPHrine, 0.02-0.3 mcg/kg/min  norepinephrine, 0.02-0.3 mcg/kg/min, Last Rate: Stopped (10/12/23 0400)  Pharmacy to dose  vancomycin,   sodium chloride, 100 mL/hr, Last Rate: 100 mL/hr (10/12/23 0700)  vasopressin, 0.03 Units/min        Results Reviewed:   I have personally reviewed the results from the time of this admission to 10/12/2023 13:37 EDT     Lab Results   Component Value Date    GLUCOSE 127 (H) 10/12/2023    CALCIUM 8.5 (L) 10/12/2023     (L) 10/12/2023    K 3.5 10/12/2023    CO2 26.0 10/12/2023    CL 97 (L) 10/12/2023    BUN 13 10/12/2023    CREATININE 2.51 (H) 10/12/2023    BCR 5.2 (L) 10/12/2023    ANIONGAP 12.0 10/12/2023      Lab Results   Component Value Date    MG 1.3 (L) 10/11/2023    ALBUMIN 2.7 (L) 10/12/2023           Assessment / Plan       Septic shock      ASSESSMENT:  Acute kidney injury has been requiring dialysis since early September 202 and he was transferred to SNF and he was dialyzed daily over the SNF.  The etiology of the BURAK probably ATN associated with sepsis, but also he was on immune O checks therapy so concern about GN but it was opted at that time when he was at [facility] not to proceed with kidney biopsy.  Septic shock improved since admission, the concern if he had catheter related sepsis will follow the recommendation of ID.  Metastatic melanoma previously on immunotherapy  Adrenal insufficiency currently on steroid  Hypomagnesemia    PLAN:  Check bladder scan every 6 hours  Check random urine for sodium, chloride and protein to creatinine ratio  Replete magnesium but I would not replete potassium at this time  We will monitor electrolyte and volume status and decide if dialysis is needed in the next 24 hours  Surveillance labs    I discussed the case with Dr. Quiroga, with the patient's nurse also with the patient's brother Dr. Eddi Cardozo  I reviewed the record and other providers notes, I reviewed imaging    Thank you for involving us in the care of Delvin Cardozo.  Please feel free to call with any questions.    Antoine Hay MD  10/12/23  13:37  "EDT    [facility]  [phone]      Please note that portions of this note were completed with a voice recognition program.    Electronically signed by Antoine Hay MD at 10/12/23 6862       Yahir Moody MD at 10/12/23 0802        Consult Orders    1. Inpatient Infectious Diseases Consult [[id number]] ordered by Jesus Wilcox Jr., MD at 10/12/23 0118                 Referring Provider: Dr Wilcox    Reason for Consultation: Patient with sepsis unclear source please help evaluate    History of present illness:  Delvin Cardozo is a 63 y.o. who I am asked to evaluate and give opinion for \"Patient with sepsis unclear source please help evaluate\" History is obtained from the patient's chart since he cannot provide any. He was brought to [facility] on 10/11/23 around 9:51 PM from [facility] due to garbled speech, lethargy, and weakness. He has a history of metastatic melanoma w/ liver metastases for which he was most recently on immunotherapy but it had to be stopped due to side effects including enteritis. He ahs also been on long term steroids. He was recently admitted to [facility] for about 3 weeks. I have requested a DC summary. It sounds like there were concerns for sepsis but no specific pathogen ever identified. There were also issues w/ hypotension especially when steroids were attempted to be weaned. He is now on dialysis and has a R chest HD catheter in place.     In the ER he did have a fever to 100.4 F with HR 93 and BP 89/53. Labs were notable for WBC 18, Procal 10, Crt 2.3, UA with 6-12 WBCs, and a negative RPP. He was given vancomycin, cefepime, and stress dose steroids. He was initially on vasopressors but these have now been weaned off. Blood cultures are pending. ID asked to evaluate.     PMH:  Metastatic melanoma on immunotherapy  Hemodialysis patient  Chronic steroid use      Antibiotic allergies and intolerances:  " None    Medications:    Current Facility-Administered Medications:     acetaminophen (TYLENOL) tablet 650 mg, 650 mg, Oral, Q4H PRN **OR** acetaminophen (TYLENOL) suppository 650 mg, 650 mg, Rectal, Q6H PRN, Jesus Wilcox Jr., MD    sennosides-docusate (PERICOLACE) 8.6-50 MG per tablet 2 tablet, 2 tablet, Oral, BID **AND** polyethylene glycol (MIRALAX) packet 17 g, 17 g, Oral, Daily PRN **AND** bisacodyl (DULCOLAX) EC tablet 5 mg, 5 mg, Oral, Daily PRN **AND** bisacodyl (DULCOLAX) suppository 10 mg, 10 mg, Rectal, Daily PRN, Jesus Wilcox Jr., MD    cefepime 2 gm IVPB in 100 ml NS (VTB), 2,000 mg, Intravenous, Q12H, Jesus Wilcox Jr., MD    dextrose (D50W) (25 g/50 mL) IV injection 25 g, 25 g, Intravenous, Q15 Min PRN, Jeuss Wilcox Jr., MD    dextrose (GLUTOSE) oral gel 15 g, 15 g, Oral, Q15 Min PRN, Jesus Wilcox Jr., MD    EPINEPHrine 5 mg in 250 mL NS infusion, 0.02-0.3 mcg/kg/min, Intravenous, Titrated, Jesus Wilcox Jr., MD    glucagon (GLUCAGEN) injection 1 mg, 1 mg, Intramuscular, Q15 Min PRN, Jesus Wilcox Jr., MD    heparin (porcine) 5000 UNIT/ML injection 5,000 Units, 5,000 Units, Subcutaneous, Q12H, Jesus Wilcox Jr., MD, 5,000 Units at 10/12/23 0306    heparin (porcine) injection 5,000 Units, 5,000 Units, Intracatheter, PRN, Jesus Wilcox Jr., MD, 5,000 Units at 10/12/23 0524    Hydrocortisone Sod Suc (PF) (Solu-CORTEF) injection 100 mg, 100 mg, Intravenous, Q8H, Jesus Wilcox Jr., MD    insulin regular (humuLIN R,novoLIN R) injection 4-24 Units, 4-24 Units, Subcutaneous, Q4H, Jesus Wilcox Jr., MD    nitroglycerin (NITROSTAT) SL tablet 0.4 mg, 0.4 mg, Sublingual, Q5 Min PRN, Jesus Wilcox Jr., MD    norepinephrine (LEVOPHED) 8 mg in 250 mL NS infusion (premix), 0.02-0.3 mcg/kg/min, Intravenous, Titrated, Charlie Avitia III, PA, Stopped at 10/12/23 0400    ondansetron (ZOFRAN) injection 4 mg, 4 mg, Intravenous, Q6H PRN,  Jesus Wilcox Jr., MD    Pharmacy to dose vancomycin, , Does not apply, Continuous PRN, Jesus Wilcox Jr., MD    [COMPLETED] Insert Peripheral IV, , , Once **AND** sodium chloride 0.9 % flush 10 mL, 10 mL, Intravenous, PRN, Charlie Avitia III, PA    sodium chloride 0.9 % flush 10 mL, 10 mL, Intravenous, Q12H, Jesus Wilcox Jr., MD, 10 mL at 10/12/23 0303    sodium chloride 0.9 % flush 10 mL, 10 mL, Intravenous, PRN, Jesus Wilcox Jr., MD    sodium chloride 0.9 % infusion 40 mL, 40 mL, Intravenous, PRN, Jesus Wilcox Jr., MD    sodium chloride 0.9 % infusion, 100 mL/hr, Intravenous, Continuous, Jesus Wilcox Jr., MD, Last Rate: 100 mL/hr at 10/12/23 0700, 100 mL/hr at 10/12/23 0700    Vancomycin Pharmacy Intermittent/Pulse Dosing, , Does not apply, Daily, Jesus Wilcox Jr., MD    vasopressin (PITRESSIN) 20 units in 100 mL NS infusion, 0.03 Units/min, Intravenous, Continuous, Jesus Wilcox Jr., MD      Objective   Vital Signs   Temp:  [98.6 °F (37 °C)-100.4 °F (38 °C)] 98.6 °F (37 °C)  Heart Rate:  [73-98] 73  Resp:  [16-20] 20  BP: ()/(48-82) 138/79    Physical Exam:   General: opens eyes to loud voice but doesn't answer any questions  Eyes: no scleral icterus  ENT: no thrush  Cardiovascular: NR  Respiratory: normal work of breathing on ambient air  GI: Abdomen is soft, no rebound or guarding  :  no Fernandez catheter  MSK: L 2nd toe with distal scab but no erythema or purulence  Neurological: Alert and oriented x 0  Psychiatric: lethargic  Vasc: R chest HD catheter w/o erythema; RUE PIV w/o erythema    Labs:     Lab Results   Component Value Date    WBC 22.31 (H) 10/12/2023    HGB 8.7 (L) 10/12/2023    HCT 27.1 (L) 10/12/2023    MCV 85.8 10/12/2023     10/12/2023       Lab Results   Component Value Date    GLUCOSE 127 (H) 10/12/2023    BUN 13 10/12/2023    CREATININE 2.51 (H) 10/12/2023    BCR 5.2 (L) 10/12/2023    CO2 26.0 10/12/2023    CALCIUM 8.5  (L) 10/12/2023    ALBUMIN 2.7 (L) 10/12/2023     (H) 10/12/2023    ALT 36 10/12/2023     LA 1.3  UA 6-12 WBCs  Procal 10    Microbiology:  10/11  RPP: negative  10/11 BCx: pending  10/12 MRSA nares: negative  10/12 BCx: pending      Radiology:  CXR  negative for pneumonia on my read; HD catheter is present    CT head no acute process per radiology report    ASSESSMENT/PLAN:  Septic shock  Metastatic melanoma previously on immunotherapy  Hemodialysis patient  Adrenal insufficiency  Chronic steroid use  Elevated procalcitonin    With HD catheter in place and procal of 10, I would be most worried about his blood cultures turning positive. I will follow-up those results and continue empiric vancomycin and renally-dosed cefepime.  I have requested a DC summary from [facility]. Stress dose steroids per ICU MD. WBC rising likely due to steroids. He has been weaned off of pressors. Guarded prognosis given underlying malignancy and acute medical problems.     While the patient is on vancomycin, vancomycin levels will be ordered and reviewed with dose adjustments made as needed. The patient will have a daily creatinine level checked while on vancomycin as part of monitoring this medication.     ID will follow.       Electronically signed by Yahir Moody MD at 10/12/23 6537

## 2023-10-12 NOTE — CONSULTS
[facility] Consult Note      Patient Name: Delvin Cardozo  : 1960  MRN: [id number]  Primary Care Physician:  Provider, No Known  Referring Physician: No ref. provider found  Date of admission: 10/11/2023    Subjective     Reason for Consult: Acute kidney injury    HPI:   Delvin Cardozo is a 63 y.o. male patient was admitted on 10/11/2023 with shock was felt may be septic shock.  He had prolonged hospitalization at [facility] subsequently was sent to [facility] he developed acute kidney injury requiring dialysis and he had a tunneled dialysis catheter.  The etiology thought to be a acute tubular injury associated with the multiple factors including he was on immune check therapy which at that time caused colitis he was treated with large dose steroids also he was given the facility so that would suggest he may had C. difficile that.  He has history of metastatic melanoma, history of hypertension, he has diabetes mellitus type 2.  At the SNF he was getting daily dialysis  His creatinine on admission yesterday was 2.3 this morning 2.5 and electrolyte within acceptable range  Bladder was scanned and he was feeling her she needs to void 345 cc.  His blood pressure is stable on no pressors at this time he is not getting any IV fluid  He denies chest pain or shortness of breath he had some discomfort when he take a deep breath in the right upper quadrant.  He has recurrent diarrhea, no nausea or vomiting present no abdominal pain he has lower extremity edema    Review of Systems:   14 point review of systems is otherwise negative except for mentioned above on HPI    Personal History     Past Medical History:   Diagnosis Date    BURAK (acute kidney injury)     Diabetes mellitus     Dialysis patient     new onset     Hypertension     Melanoma     Tumor     left flank       No past surgical history on file.    Family History: family history is not on file.    Social History:      Home  Medications:  Prior to Admission medications    Not on File       Allergies:  Allergies   Allergen Reactions    Kiwi Extract Unknown - Low Severity       Objective     Vitals:   Temp:  [96.8 °F (36 °C)-100.4 °F (38 °C)] 97.7 °F (36.5 °C)  Heart Rate:  [73-98] 76  Resp:  [16-20] 18  BP: ()/(48-95) 144/80    Intake/Output Summary (Last 24 hours) at 10/12/2023 1337  Last data filed at 10/12/2023 0700  Gross per 24 hour   Intake 2717.38 ml   Output --   Net 2717.38 ml       Physical Exam:   Constitutional: Awake, alert, no acute distress.  Chronically ill  HEENT: Sclera anicteric, no conjunctival injection, oral mucosa slightly  Neck: Supple, no thyromegaly, no lymphadenopathy, trachea at midline, no JVD, tunneled dialysis catheter in the right IJ with exit site below the right clavicle  Respiratory: Bilateral rhonchi, nonlabored respiration  Cardiovascular: RRR, no murmurs, no rubs or gallops, no carotid bruit  Gastrointestinal: Positive bowel sounds, abdomen is soft, nontender and nondistended  : Slightly palpable bladder  Musculoskeletal: 2+ lower extremity edema, no clubbing or cyanosis  Psychiatric: Appropriate affect, cooperative  Neurologic: Oriented x3, moving all extremities, normal speech and mental status  Skin: Warm and dry       Scheduled Meds:     cefepime, 2,000 mg, Intravenous, Q12H  famotidine, 20 mg, Intravenous, Daily  heparin (porcine), 5,000 Units, Subcutaneous, Q12H  hydrocortisone sodium succinate, 100 mg, Intravenous, Q8H  insulin regular, 4-24 Units, Subcutaneous, Q4H  senna-docusate sodium, 2 tablet, Oral, BID  sodium chloride, 10 mL, Intravenous, Q12H  vancomycin, 750 mg, Intravenous, Once  Vancomycin Pharmacy Intermittent/Pulse Dosing, , Does not apply, Daily      IV Meds:   EPINEPHrine, 0.02-0.3 mcg/kg/min  norepinephrine, 0.02-0.3 mcg/kg/min, Last Rate: Stopped (10/12/23 0400)  Pharmacy to dose vancomycin,   sodium chloride, 100 mL/hr, Last Rate: 100 mL/hr (10/12/23  0700)  vasopressin, 0.03 Units/min        Results Reviewed:   I have personally reviewed the results from the time of this admission to 10/12/2023 13:37 EDT     Lab Results   Component Value Date    GLUCOSE 127 (H) 10/12/2023    CALCIUM 8.5 (L) 10/12/2023     (L) 10/12/2023    K 3.5 10/12/2023    CO2 26.0 10/12/2023    CL 97 (L) 10/12/2023    BUN 13 10/12/2023    CREATININE 2.51 (H) 10/12/2023    BCR 5.2 (L) 10/12/2023    ANIONGAP 12.0 10/12/2023      Lab Results   Component Value Date    MG 1.3 (L) 10/11/2023    ALBUMIN 2.7 (L) 10/12/2023           Assessment / Plan       Septic shock      ASSESSMENT:  Acute kidney injury has been requiring dialysis since early September 202 and he was transferred to SNF and he was dialyzed daily over the SNF.  The etiology of the BURAK probably ATN associated with sepsis, but also he was on immune O checks therapy so concern about GN but it was opted at that time when he was at [facility] not to proceed with kidney biopsy.  Septic shock improved since admission, the concern if he had catheter related sepsis will follow the recommendation of ID.  Metastatic melanoma previously on immunotherapy  Adrenal insufficiency currently on steroid  Hypomagnesemia    PLAN:  Check bladder scan every 6 hours  Check random urine for sodium, chloride and protein to creatinine ratio  Replete magnesium but I would not replete potassium at this time  We will monitor electrolyte and volume status and decide if dialysis is needed in the next 24 hours  Surveillance labs    I discussed the case with Dr. Quiroga, with the patient's nurse also with the patient's brother Dr. Eddi Cardozo  I reviewed the record and other providers notes, I reviewed imaging    Thank you for involving us in the care of Delvin Cardozo.  Please feel free to call with any questions.    Antoine Hay MD  10/12/23  13:37 EDT    [facility]  [phone]      Please note that  portions of this note were completed with a voice recognition program.

## 2023-10-12 NOTE — CONSULTS
Date of Consultation: 10/12/23    Referral Provider: Elliot Wilcox MD     Reason for Consultation: Elevated troponin     Encounter Provider: Andrew Nguyen MD    Group of Service: [facility]     Patient Name: Delvin Cardozo    :1960    Chief complaint: Altered mental status.    History of Present Illness: Delvin Cardozo is a 63 year old who does not follow with a cardiologist and has a past medical history that is significant for metastatic melanoma with liver metastases which he had been receiving immunotherapy for. He was recently hospitalized at [facility] for three weeks with sepsis. His family reports that he had had issues since stopping immunotherapy. He has been on chronic steroids and reportedly when he becomes septic he responds well to steroids. He developed an acute kidney injury during a previous episode of sepsis and has required dialysis. He has a temporary tunneled catheter. He is making urine.     He presented to the ED on 10/11/23 from [facility] with altered mental status. He received dialysis yesterday without reported complication. He was found to be hypotensive and lethargic. Workup in the ED revealed a HS troponin of 678 initially which decreased to 675 on second draw. He was hypokalemic with a potassium of 3.1, his creatinine was 2.3. His procalcitonin was elevated at 10.60 and WBC 18.69. His chest xray showed no acute findings. CT of the head showed no acute findings.     He is being treated for presumed sepsis.  When I saw him earlier this morning, he was very somnolent, and I really could not arouse him for any meaningful conversation.  His initial high-sensitivity troponin was 678, and this was 675 on repeat.  His EKG did not show any acute changes.        Past Medical History:   Diagnosis Date    BURAK (acute kidney injury)     Diabetes mellitus     Dialysis patient     new onset     Hypertension     Melanoma     Tumor     left flank         No past  "surgical history on file.      Allergies   Allergen Reactions    Kiwi Extract Unknown - Low Severity         No current facility-administered medications on file prior to encounter.     No current outpatient medications on file prior to encounter.         Social History     Socioeconomic History    Marital status:          History reviewed. No pertinent family history.    REVIEW OF SYSTEMS:   The patient was fairly somnolent, and a review of systems is not possible at this time.     Objective:     Vitals:    10/12/23 1615 10/12/23 1700 10/12/23 1800 10/12/23 1900   BP: (!) 157/101 166/92 149/88 131/78   BP Location: Right arm      Patient Position: Lying      Pulse: 80 84 80 86   Resp: 18      Temp:       TempSrc:       SpO2: 96% 97% 96% 96%   Weight:       Height:         Body mass index is 27.97 kg/m².  Flowsheet Rows      Flowsheet Row First Filed Value   Admission Height 170.2 cm (67\") Documented at 10/11/2023 2153   Admission Weight 83.9 kg (184 lb 14.4 oz) Documented at 10/11/2023 2153             General:    Somnolent, does not arouse for meaningful conversation                   Head:    Normocephalic, atraumatic.   Eyes:          Conjunctivae and sclerae normal, no icterus.   Throat:   No oral lesions, no thrush, oral mucosa moist.    Neck:   Supple, trachea midline.   Lungs:     Clear to auscultation bilaterally     Heart:    Regular rhythm and normal rate.  No murmurs, gallops, or rubs noted.   Abdomen:     Soft, non-tender, non-distended, positive bowel sounds.    Extremities:   No clubbing, cyanosis, or edema.     Pulses:   Pulses palpable and equal bilaterally.    Skin:   No bleeding or rash.   Neuro:   Non-focal.  Moves all extremities well.    Psychiatric:   Somnolent.     Lab Review:                Results from last 7 days   Lab Units 10/12/23  0251   SODIUM mmol/L 135*   POTASSIUM mmol/L 3.5   CHLORIDE mmol/L 97*   CO2 mmol/L 26.0   BUN mg/dL 13   CREATININE mg/dL 2.51*   GLUCOSE mg/dL 127* "   CALCIUM mg/dL 8.5*     Results from last 7 days   Lab Units 10/12/23  0037 10/11/23  2156   HSTROP T ng/L 675* 678*     Results from last 7 days   Lab Units 10/12/23  0251   WBC 10*3/mm3 22.31*   HEMOGLOBIN g/dL 8.7*   HEMATOCRIT % 27.1*   PLATELETS 10*3/mm3 334             Results from last 7 days   Lab Units 10/11/23  2156   MAGNESIUM mg/dL 1.3*           EKG (reviewed by me personally):          Assessment:   1.  Sepsis with septic shock  2.  Altered mental status  3.  Melanoma, previously on immunotherapy  4.  Recent acute kidney injury, now requiring hemodialysis  5.  Adrenal insufficiency, treated with steroids  6.  Elevated troponin, type II NSTEMI likely secondary to 1 and #4  7.  Anemia of chronic disease  8.  Diabetes    Plan:       Again, I could not get him to arouse for meaningful conversation today when I saw him.  However, he is now requiring hemodialysis, and I suspect that the elevated troponin is a combination of a type II NSTEMI secondary to the septic shock, as well as with a component from the kidney disease.  I doubt that this is a type I NSTEMI as the troponin levels did not rise significantly on the second set.    For now, I am going to check an echocardiogram to assess for wall motion abnormalities and overall ejection fraction.  He is being treated for possible underlying sepsis currently, and he may ultimately need his hemodialysis catheter changed.  He is off of pressors, which is a good sign.  I really do not feel there is any cardiogenic component to his shock or presentation.    Cardiology will continue to follow.  Thank you very much for this consult.    Flavio Nguyen MD

## 2023-10-12 NOTE — CONSULTS
"Referring Provider: Dr Wilcox    Reason for Consultation: Patient with sepsis unclear source please help evaluate    History of present illness:  Delvin Cardozo is a 63 y.o. who I am asked to evaluate and give opinion for \"Patient with sepsis unclear source please help evaluate\" History is obtained from the patient's chart since he cannot provide any. He was brought to [facility] on 10/11/23 around 9:51 PM from [facility] due to garbled speech, lethargy, and weakness. He has a history of metastatic melanoma w/ liver metastases for which he was most recently on immunotherapy but it had to be stopped due to side effects including enteritis. He ahs also been on long term steroids. He was recently admitted to [facility] for about 3 weeks. I have requested a DC summary. It sounds like there were concerns for sepsis but no specific pathogen ever identified. There were also issues w/ hypotension especially when steroids were attempted to be weaned. He is now on dialysis and has a R chest HD catheter in place.     In the ER he did have a fever to 100.4 F with HR 93 and BP 89/53. Labs were notable for WBC 18, Procal 10, Crt 2.3, UA with 6-12 WBCs, and a negative RPP. He was given vancomycin, cefepime, and stress dose steroids. He was initially on vasopressors but these have now been weaned off. Blood cultures are pending. ID asked to evaluate.     PMH:  Metastatic melanoma on immunotherapy  Hemodialysis patient  Chronic steroid use      Antibiotic allergies and intolerances:  None    Medications:    Current Facility-Administered Medications:     acetaminophen (TYLENOL) tablet 650 mg, 650 mg, Oral, Q4H PRN **OR** acetaminophen (TYLENOL) suppository 650 mg, 650 mg, Rectal, Q6H PRN, Jesus Wilcox Jr., MD    sennosides-docusate (PERICOLACE) 8.6-50 MG per tablet 2 tablet, 2 tablet, Oral, BID **AND** polyethylene glycol (MIRALAX) packet 17 g, 17 g, Oral, Daily PRN **AND** bisacodyl (DULCOLAX) EC tablet 5 mg, 5 mg, Oral, " Daily PRN **AND** bisacodyl (DULCOLAX) suppository 10 mg, 10 mg, Rectal, Daily PRN, Jesus Wilcox Jr., MD    cefepime 2 gm IVPB in 100 ml NS (VTB), 2,000 mg, Intravenous, Q12H, Jesus Wilcox Jr., MD    dextrose (D50W) (25 g/50 mL) IV injection 25 g, 25 g, Intravenous, Q15 Min PRN, Jesus Wilcox Jr., MD    dextrose (GLUTOSE) oral gel 15 g, 15 g, Oral, Q15 Min PRN, Jesus Wilcox Jr., MD    EPINEPHrine 5 mg in 250 mL NS infusion, 0.02-0.3 mcg/kg/min, Intravenous, Titrated, Jesus Wilcox Jr., MD    glucagon (GLUCAGEN) injection 1 mg, 1 mg, Intramuscular, Q15 Min PRN, Jesus Wilcox Jr., MD    heparin (porcine) 5000 UNIT/ML injection 5,000 Units, 5,000 Units, Subcutaneous, Q12H, Jesus Wilcox Jr., MD, 5,000 Units at 10/12/23 0306    heparin (porcine) injection 5,000 Units, 5,000 Units, Intracatheter, PRN, Jesus Wilcox Jr., MD, 5,000 Units at 10/12/23 0524    Hydrocortisone Sod Suc (PF) (Solu-CORTEF) injection 100 mg, 100 mg, Intravenous, Q8H, Jesus Wilcox Jr., MD    insulin regular (humuLIN R,novoLIN R) injection 4-24 Units, 4-24 Units, Subcutaneous, Q4H, Jesus Wilcox Jr., MD    nitroglycerin (NITROSTAT) SL tablet 0.4 mg, 0.4 mg, Sublingual, Q5 Min PRN, Jesus Wilcox Jr., MD    norepinephrine (LEVOPHED) 8 mg in 250 mL NS infusion (premix), 0.02-0.3 mcg/kg/min, Intravenous, Titrated, Charlie Avitia III, PA, Stopped at 10/12/23 0400    ondansetron (ZOFRAN) injection 4 mg, 4 mg, Intravenous, Q6H PRN, Jesus Wilcox Jr., MD    Pharmacy to dose vancomycin, , Does not apply, Continuous PRN, Jesus Wilcox Jr., MD    [COMPLETED] Insert Peripheral IV, , , Once **AND** sodium chloride 0.9 % flush 10 mL, 10 mL, Intravenous, PRN, Charlie Avitia III, PA    sodium chloride 0.9 % flush 10 mL, 10 mL, Intravenous, Q12H, Jesus Wilcox Jr., MD, 10 mL at 10/12/23 0303    sodium chloride 0.9 % flush 10 mL, 10 mL, Intravenous, PRN, Brenden,  Jesus Zarate Jr., MD    sodium chloride 0.9 % infusion 40 mL, 40 mL, Intravenous, PRN, Jesus Wilcox Jr., MD    sodium chloride 0.9 % infusion, 100 mL/hr, Intravenous, Continuous, Jesus Wilcox Jr., MD, Last Rate: 100 mL/hr at 10/12/23 0700, 100 mL/hr at 10/12/23 0700    Vancomycin Pharmacy Intermittent/Pulse Dosing, , Does not apply, Daily, Jesus Wilcox Jr., MD    vasopressin (PITRESSIN) 20 units in 100 mL NS infusion, 0.03 Units/min, Intravenous, Continuous, Jesus Wilcox Jr., MD      Objective   Vital Signs   Temp:  [98.6 °F (37 °C)-100.4 °F (38 °C)] 98.6 °F (37 °C)  Heart Rate:  [73-98] 73  Resp:  [16-20] 20  BP: ()/(48-82) 138/79    Physical Exam:   General: opens eyes to loud voice but doesn't answer any questions  Eyes: no scleral icterus  ENT: no thrush  Cardiovascular: NR  Respiratory: normal work of breathing on ambient air  GI: Abdomen is soft, no rebound or guarding  :  no Fernandez catheter  MSK: L 2nd toe with distal scab but no erythema or purulence  Neurological: Alert and oriented x 0  Psychiatric: lethargic  Vasc: R chest HD catheter w/o erythema; RUE PIV w/o erythema    Labs:     Lab Results   Component Value Date    WBC 22.31 (H) 10/12/2023    HGB 8.7 (L) 10/12/2023    HCT 27.1 (L) 10/12/2023    MCV 85.8 10/12/2023     10/12/2023       Lab Results   Component Value Date    GLUCOSE 127 (H) 10/12/2023    BUN 13 10/12/2023    CREATININE 2.51 (H) 10/12/2023    BCR 5.2 (L) 10/12/2023    CO2 26.0 10/12/2023    CALCIUM 8.5 (L) 10/12/2023    ALBUMIN 2.7 (L) 10/12/2023     (H) 10/12/2023    ALT 36 10/12/2023     LA 1.3  UA 6-12 WBCs  Procal 10    Microbiology:  10/11  RPP: negative  10/11 BCx: pending  10/12 MRSA nares: negative  10/12 BCx: pending      Radiology:  CXR  negative for pneumonia on my read; HD catheter is present    CT head no acute process per radiology report    ASSESSMENT/PLAN:  Septic shock  Metastatic melanoma previously on  immunotherapy  Hemodialysis patient  Adrenal insufficiency  Chronic steroid use  Elevated procalcitonin    With HD catheter in place and procal of 10, I would be most worried about his blood cultures turning positive. I will follow-up those results and continue empiric vancomycin and renally-dosed cefepime.  I have requested a DC summary from [facility]. Stress dose steroids per ICU MD. WBC rising likely due to steroids. He has been weaned off of pressors. Guarded prognosis given underlying malignancy and acute medical problems.     While the patient is on vancomycin, vancomycin levels will be ordered and reviewed with dose adjustments made as needed. The patient will have a daily creatinine level checked while on vancomycin as part of monitoring this medication.     ID will follow.      7

## 2023-10-12 NOTE — PLAN OF CARE
Goal Outcome Evaluation:     Patient arrived in sepsis, noted large open wound on left posterior flank, wound care consulted, levophed weaned off at this time. Will continue to watch for blood pressure drop.

## 2023-10-12 NOTE — ED NOTES
Called [facility] medical Records to obtain records from [facility]. Unable to obtain records at this time. Pt was admitted there on 10/10/23 with no discharge date for records. Spoke with David

## 2023-10-12 NOTE — PROGRESS NOTES
"[facility] Clinical Pharmacy Services: Vancomycin Monitoring Note    Delvin Cardozo is a 63 y.o. male who is on day 2/7 of pharmacy to dose vancomycin for Sepsis.    Previous Vancomycin Dose: intermittent:  LD  10/11 2318  1750 mg x 1  Updated Cultures and Sensitivities:   -10/12  BC pending  -10/12 MRSA screen negative   -10/11  Urine:  pend  Results from last 7 days   Lab Units 10/12/23  1154   VANCOMYCIN RM mcg/mL 19.40     Vitals/Labs  Ht: 170.2 cm (67\"); Wt: 81 kg (178 lb 9.2 oz)   Temp Readings from Last 1 Encounters:   10/12/23 97.7 °F (36.5 °C) (Oral)     Estimated Creatinine Clearance: 31.1 mL/min (A) (by C-G formula based on SCr of 2.51 mg/dL (H)).       Results from last 7 days   Lab Units 10/12/23  0251 10/11/23  2156   CREATININE mg/dL 2.51* 2.30*   WBC 10*3/mm3 22.31* 18.69*     Assessment/Plan    -Vanc random level at 1154 today as 19.4 mcg/ml. Received loading dose of 1750 mg last night at 2318. Scr is slightly decreased. Will continue intermittent dosing.     Current Vancomycin Dose: redose w/ 750 mg iv x 1 today  Next Level Date and Time: Vanc Random on 10/13 w/ am labs  We will continue to monitor patient changes and renal function     Thank you for involving pharmacy in this patient's care. Please contact pharmacy with any questions or concerns.       Bj Goncalves [facility]  Clinical Pharmacist          "

## 2023-10-12 NOTE — PROGRESS NOTES
"[facility] Clinical Pharmacy Services: Vancomycin Pharmacokinetic Initial Consult Note    Delvin Cardozo is a 63 y.o. male who is on day 1 of pharmacy to dose vancomycin.    Indication: Sepsis  Consulting Provider: Dr Jesus Wilcox  Planned Duration of Therapy: 7 days  Loading Dose Ordered or Given: 1750 mg on 10/11/23 at 2300  Target: Dose by Levels    Other Antimicrobials: Cefepeme    Vitals/Labs  Ht: 170.2 cm (67\"); Wt: 83.9 kg (184 lb 14.4 oz)  Temp Readings from Last 1 Encounters:   10/11/23 100.4 °F (38 °C)    Estimated Creatinine Clearance: 34.5 mL/min (A) (by C-G formula based on SCr of 2.3 mg/dL (H)).        Results from last 7 days   Lab Units 10/11/23  2156   CREATININE mg/dL 2.30*   WBC 10*3/mm3 18.69*     Assessment/Plan:    Vancomycin Dose:   Pharmacy will intermittently pulse dose Vancomycin based on levels.   Vanc Random has been ordered for 10/12/23 at 1200     Pharmacy will follow patient's kidney function and will adjust doses and obtain levels as necessary. Thank you for involving pharmacy in this patient's care. Please contact pharmacy with any questions or concerns.                           Josh Hawkins [facility]  Clinical Pharmacist    "

## 2023-10-12 NOTE — CASE MANAGEMENT/SOCIAL WORK
Discharge Planning Assessment  [facility]     Patient Name: Delvin Cardozo  MRN: [id number]  Today's Date: 10/12/2023    Admit Date: 10/11/2023    Plan: Pending   Discharge Needs Assessment    No documentation.                  Discharge Plan       Row Name 10/12/23 1420       Plan    Plan Pending    Plan Comments CCP spoke with Araceli/Liseth who states miriam is from a Medicaid bed hold in their short term rehab. He was receiving dialysis at their facility. CCP spoke with patient's son Bj over the phone regarding discharge planning. Per request of son, CCP sent additional SNF referrals to facilities with HD. Son is agreeable to [facility] if there are no additional accepting facilities. CCP to follow to see if patient will continue to need HD at discharge. TERE GAMEZ                  Continued Care and Services - Admitted Since 10/11/2023       Destination       Service Provider Request Status Selected Services Address Phone Fax Patient Preferred    [facility] Accepted N/A [address] [facility] [id number] [phone] --    [facility] Pending - Request Sent N/A [address] [facility] [address] [phone] --       Internal Comment last updated by Agustina Farris CSW 10/12/2023 1419    [facility] Pending - Request Sent N/A [address] [facility] [facility] [address] [phone] --    [facility] Pending - Request Sent N/A [address] [facility] [address] [phone] --                     Demographic Summary    No documentation.                  Functional Status    No documentation.                  Psychosocial    No documentation.                  Abuse/Neglect    No documentation.                  Legal    No documentation.                  Substance Abuse    No documentation.                  Patient Forms     No documentation.                     Agustina Farris, VIBHAW

## 2023-10-12 NOTE — ED NOTES
Nursing report ED to floor  Delvin Cardozo  63 y.o.  male    HPI :   Chief Complaint   Patient presents with    Altered Mental Status       Admitting doctor:   Jesus Wilcox Jr., MD    Admitting diagnosis:   The primary encounter diagnosis was Septic shock. A diagnosis of Hypotension, unspecified hypotension type was also pertinent to this visit.    Code status:   Current Code Status       Date Active Code Status Order ID Comments User Context       Not on file            Allergies:   Patient has no allergy information on record.    Isolation:   No active isolations    Intake and Output    Intake/Output Summary (Last 24 hours) at 10/12/2023 0028  Last data filed at 10/11/2023 2302  Gross per 24 hour   Intake 600 ml   Output --   Net 600 ml       Weight:       10/11/23  2153   Weight: 83.9 kg (184 lb 14.4 oz)       Most recent vitals:   Vitals:    10/11/23 2351 10/12/23 0001 10/12/23 0011 10/12/23 0021   BP: 99/75 102/56 113/68 99/56   Pulse: 91 89 98 92   Resp:       Temp:       SpO2: 93% 94% 95% 94%   Weight:       Height:           Active LDAs/IV Access:   Lines, Drains & Airways       Active LDAs       Name Placement date Placement time Site Days    Peripheral IV Anterior;Left Forearm --  --  Forearm  --    Peripheral IV 10/11/23 2200 Anterior;Distal;Right Forearm 10/11/23  2200  Forearm  less than 1                    Labs (abnormal labs have a star):   Labs Reviewed   COMPREHENSIVE METABOLIC PANEL - Abnormal; Notable for the following components:       Result Value    Creatinine 2.30 (*)     Sodium 135 (*)     Potassium 3.1 (*)     Chloride 95 (*)     Total Protein 5.5 (*)     Albumin 2.8 (*)     AST (SGOT) 146 (*)     Alkaline Phosphatase 547 (*)     BUN/Creatinine Ratio 4.3 (*)     eGFR 31.3 (*)     All other components within normal limits    Narrative:     GFR Normal >60  Chronic Kidney Disease <60  Kidney Failure <15     URINALYSIS W/ MICROSCOPIC IF INDICATED (NO CULTURE) - Abnormal; Notable for the  "following components:    Protein,  mg/dL (2+) (*)     All other components within normal limits   MAGNESIUM - Abnormal; Notable for the following components:    Magnesium 1.3 (*)     All other components within normal limits   TROPONIN - Abnormal; Notable for the following components:    HS Troponin T 678 (*)     All other components within normal limits    Narrative:     High Sensitive Troponin T Reference Range:  <10.0 ng/L- Negative Female for AMI  <15.0 ng/L- Negative Male for AMI  >=10 - Abnormal Female indicating possible myocardial injury.  >=15 - Abnormal Male indicating possible myocardial injury.   Clinicians would have to utilize clinical acumen, EKG, Troponin, and serial changes to determine if it is an Acute Myocardial Infarction or myocardial injury due to an underlying chronic condition.        PROCALCITONIN - Abnormal; Notable for the following components:    Procalcitonin 10.60 (*)     All other components within normal limits    Narrative:     As a Marker for Sepsis (Non-Neonates):    1. <0.5 ng/mL represents a low risk of severe sepsis and/or septic shock.  2. >2 ng/mL represents a high risk of severe sepsis and/or septic shock.    As a Marker for Lower Respiratory Tract Infections that require antibiotic therapy:    PCT on Admission    Antibiotic Therapy       6-12 Hrs later    >0.5                Strongly Recommended  >0.25 - <0.5        Recommended   0.1 - 0.25          Discouraged              Remeasure/reassess PCT  <0.1                Strongly Discouraged     Remeasure/reassess PCT    As 28 day mortality risk marker: \"Change in Procalcitonin Result\" (>80% or <=80%) if Day 0 (or Day 1) and Day 4 values are available. Refer to http://www.bras-pct-calculator.com    Change in PCT <=80%  A decrease of PCT levels below or equal to 80% defines a positive change in PCT test result representing a higher risk for 28-day all-cause mortality of patients diagnosed with severe sepsis for septic " shock.    Change in PCT >80%  A decrease of PCT levels of more than 80% defines a negative change in PCT result representing a lower risk for 28-day all-cause mortality of patients diagnosed with severe sepsis or septic shock.      CBC WITH AUTO DIFFERENTIAL - Abnormal; Notable for the following components:    WBC 18.69 (*)     RBC 3.05 (*)     Hemoglobin 8.3 (*)     Hematocrit 26.2 (*)     Neutrophil % 76.7 (*)     Lymphocyte % 13.4 (*)     Monocyte % 4.8 (*)     Immature Grans % 4.1 (*)     Neutrophils, Absolute 14.33 (*)     Immature Grans, Absolute 0.77 (*)     nRBC 0.3 (*)     All other components within normal limits   URINALYSIS, MICROSCOPIC ONLY - Abnormal; Notable for the following components:    WBC, UA 6-12 (*)     Bacteria, UA Trace (*)     All other components within normal limits   RESPIRATORY PANEL PCR W/ COVID-19 (SARS-COV-2) EL/BARNEY/DYLAN/PAD/COR/MAD/NIGEL IN-HOUSE, NP SWAB IN [facility], 3-4 HR TAT - Normal    Narrative:     In the setting of a positive respiratory panel with a viral infection PLUS a negative procalcitonin without other underlying concern for bacterial infection, consider observing off antibiotics or discontinuation of antibiotics and continue supportive care. If the respiratory panel is positive for atypical bacterial infection (Bordetella pertussis, Chlamydophila pneumoniae, or Mycoplasma pneumoniae), consider antibiotic de-escalation to target atypical bacterial infection.   LACTIC ACID, PLASMA - Normal   BLOOD CULTURE   BLOOD CULTURE   HIGH SENSITIVITIY TROPONIN T 2HR   CBC AND DIFFERENTIAL    Narrative:     The following orders were created for panel order CBC & Differential.  Procedure                               Abnormality         Status                     ---------                               -----------         ------                     CBC Auto Differential[[id number]]        Abnormal            Final result                 Please view results for these tests on the  individual orders.       EKG:   ECG 12 Lead Altered Mental Status   Preliminary Result   HEART RATE= 96  bpm   RR Interval= 625  ms   WI Interval= 121  ms   P Horizontal Axis= 23  deg   P Front Axis= 42  deg   QRSD Interval= 110  ms   QT Interval= 401  ms   QTcB= 507  ms   QRS Axis= -1  deg   T Wave Axis= 25  deg   - ABNORMAL ECG -   Sinus rhythm   Prolonged QT interval   Electronically Signed By:    Date and Time of Study: 2023-10-11 21:50:19          Meds given in ED:   Medications   sodium chloride 0.9 % flush 10 mL (has no administration in time range)   vancomycin 1750 mg/500 mL 0.9% NS IVPB ([facility]) (1,750 mg Intravenous New Bag 10/11/23 2318)   norepinephrine (LEVOPHED) 8 mg in 250 mL NS infusion (premix) (0.06 mcg/kg/min × 83.9 kg Intravenous New Bag 10/11/23 2330)   potassium chloride 10 mEq in 100 mL IVPB (10 mEq Intravenous New Bag 10/11/23 2339)   cefepime 2 gm IVPB in 100 ml NS (VTB) (0 mg Intravenous Stopped 10/11/23 2302)   Hydrocortisone Sod Suc (PF) (Solu-CORTEF) injection 200 mg (200 mg Intravenous Given 10/12/23 0019)       Imaging results:  XR Chest 1 View    Result Date: 10/11/2023  1. No definite active disease is seen in the chest. Lung volumes are low with horizontal platelike atelectasis at the lung bases. There is a right internal jugular HemoCath in place with its tip in the superior aspect of the right atrium.      CT Head Without Contrast    Result Date: 10/11/2023  1. No acute intracranial abnormality is identified. There is mild small vessel disease in the periventricular white matter of the cerebral hemispheres. There is mild diffuse cerebral atrophy. The remainder of the head CT is normal. The etiology of the patient's confusion and altered mental status is not established on this exam.  Radiation dose reduction techniques were utilized, including automated exposure control and exposure modulation based on body size.   This report was finalized on 10/11/2023 11:23 PM by Dr. Jeff Vargas,  M.D on Workstation: [id number]       Ambulatory status:   - bedrest    Social issues:   Social History     Socioeconomic History    Marital status:        NIH Stroke Scale:       Olamide Lipscomb RN  10/12/23 00:28 EDT

## 2023-10-12 NOTE — DISCHARGE PLACEMENT REQUEST
"Chandler Carodzo (63 y.o. Male)       Date of Birth   1960    Social Security Number       Address   [address] [place] [facility] [address]    Home Phone       MRN   [id number]       Congregation   Qu[employer]    Marital Status                               Admission Date   10/11/23    Admission Type   Emergency    Admitting Provider   Jesus Wilcox Jr., MD    Attending Provider   Jesus Wilcox Jr., MD    Department, Room/Bed   [facility] INTENSIVE CARE, I373/1       Discharge Date       Discharge Disposition       Discharge Destination                                 Attending Provider: Jesus Wilcox Jr., MD    Allergies: Kiwi Extract    Isolation: None   Infection: None   Code Status: CPR    Ht: 170.2 cm (67\")   Wt: 81 kg (178 lb 9.2 oz)    Admission Cmt: None   Principal Problem: Septic shock [A41.9,R65.21]                   Active Insurance as of 10/11/2023       Primary Coverage       Payor Plan Insurance Group Employer/Plan Group    [facility] BY ERNESTO [id number]       Payor Plan Address Payor Plan Phone Number Payor Plan Fax Number Effective Dates    [address] [address]   2/1/2022 - None Entered    [facility] [address]         Subscriber Name Subscriber Birth Date Member ID       CHANDLER CARDOZO 1960 [id number]                     Emergency Contacts        (Rel.) Home Phone Work Phone Mobile Phone    ANN CARDOZO (Son) [phone]    ANTHONY DWYER (Sister) [phone]    Judy Garg (Sister) -- -- [phone]                "

## 2023-10-12 NOTE — PLAN OF CARE
"Goal Outcome Evaluation: pt remain in icu on room air. Stopped levo since morning. Pt unable to voiced bladder scanned twice q 6hrs, resulted 275 and 343 mls. Unable to straight cath by 2 nurses. curled up after insertion. Pt stated 'want to try to void by standing.\" Finally able to void 200mls. Send the specimen to the lab.  BM X3. Vital stable. Plan of care ongoing.                          "

## 2023-10-12 NOTE — ED PROVIDER NOTES
Problem: Knowledge Deficit - Standard  Goal: Patient and family/care givers will demonstrate understanding of plan of care, disease process/condition, diagnostic tests and medications  Outcome: Progressing     Problem: Psychosocial  Goal: Patient's level of anxiety will decrease  Outcome: Progressing     Problem: Discharge Barriers/Planning  Goal: Patient's continuum of care needs are met  Outcome: Progressing     Problem: Hemodynamics  Goal: Patient's hemodynamics, fluid balance and neurologic status will be stable or improve  Outcome: Progressing     Problem: Infection - Standard  Goal: Patient will remain free from infection  Outcome: Progressing  Flowsheets (Taken 5/8/2023 2200)  Standard Infection Interventions:   Assessed for signs and symptoms of infection   Instructed patient/family on signs and symptoms of infection   Implemented standard precautions   The patient is Stable - Low risk of patient condition declining or worsening    Shift Goals  Clinical Goals: Monitor HR, stress test, Echo  Patient Goals: Rest    Progress made toward(s) clinical / shift goals:  improving    Patient is not progressing towards the following goals:       MD ATTESTATION NOTE    The SANDEEP and I have discussed this patient's history, physical exam, and treatment plan.  I have reviewed the documentation and personally had a face to face interaction with the patient. I affirm the documentation and agree with the treatment and plan.  The attached note describes my personal findings.      I provided a substantive portion of the care of the patient.  I personally performed the physical exam in its entirety, and below are my findings.        Brief HPI: 63-year-old male with metastatic cancer presenting for evaluation of altered mental status with confusion worsening throughout the day today.    PHYSICAL EXAM  ED Triage Vitals   Temp Heart Rate Resp BP SpO2   10/11/23 2147 10/11/23 2147 10/11/23 2153 10/11/23 2147 10/11/23 2147   100.4 °F (38 °C) 93 16 (!) 89/53 92 %      Temp src Heart Rate Source Patient Position BP Location FiO2 (%)   -- -- -- -- --                GENERAL: Ill-appearing and toxic  HENT: nares patent  EYES: no scleral icterus  CV: regular rhythm, normal rate  RESPIRATORY: normal effort  ABDOMEN: soft  MUSCULOSKELETAL: no deformity  NEURO: Alert and oriented x1, does move all extremities and follows commands  PSYCH:  calm, cooperative  SKIN: warm, dry    Vital signs and nursing notes reviewed.        Plan: 63-year-old male presenting for evaluation of encephalopathy.  Laboratory evaluation is notable for leukocytosis of 18, troponin of 678, Pro-Jono of 10, creatinine of 2.3, severely elevated AST, alk phos.  Patient is very ill-appearing.  Broad-spectrum antibiotics initiated.  Chest x-ray is unremarkable.  CT head is unremarkable.  Patient will be admitted to the ICU as he was minimally responsive to fluid resuscitation and required initiation of Levophed.  Unclear etiology of septic shock.       Douglas Case MD  10/12/23 3487

## 2023-10-12 NOTE — NURSING NOTE
CWOCN- consult for left flank and left toe.   Patient reports he has melanoma to the left flank. Most of the area is firm, purple, dark with an opening about 2x2cm noted. It does not appear to tunnel. There is serous drainage. Recommend to keep the tissue clean and dry, paint with betadine daily and cover with dry dressing such as a silicone border dressing.   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 The tip is necrotic, hard, without drainage. Recommend to paint with betadine to keep dry, clean. It does not need to be wrapped but patient should wear clean socks if up to the chair or ambulating. Discussed plan with patient.        10/12/23 1354   Wound 10/12/23 0102 Left flank   Placement Date/Time: 10/12/23 0102   Side: Left  Location: flank  Wound Outcome: (c)    Dressing Appearance moist drainage   Base moist;gray;maroon/purple   Periwound indurated   Periwound Temperature warm   Periwound Skin Turgor firm   Edges irregular;jagged   Drainage Characteristics/Odor serous   Drainage Amount scant   Care, Wound cleansed with  (betadine)   Dressing Care dressing changed;border dressing   Periwound Care dry periwound area maintained   Wound 10/12/23 Left second toe Arterial Ulcer   Placement Date: 10/12/23   Present on Original Admission: Yes  Side: Left  Location: second toe  Primary Wound Type: Arterial Ulcer   Dressing Appearance open to air   Base necrotic;dry   Periwound intact;dry   Drainage Amount none   Care, Wound cleansed with  (betadine)   Dressing Care open to air

## 2023-10-12 NOTE — ED NOTES
Per EMS family came to check on pt this morning and pt was AxO x 1, normally AxO x 4. Pt received hemodialysis today and was HoTN after.  Pt presents to the ER with garbled speech and lethragic.

## 2023-10-12 NOTE — ED PROVIDER NOTES
EMERGENCY DEPARTMENT ENCOUNTER    Room Number:  I373/1  PCP: Provider, No Known      HPI:  Chief Complaint: AMS  A complete HPI/ROS/PMH/PSH/SH/FH are unobtainable due to: Patient is a poor historian and confused.  Context: Delvin Cardozo is a 63 y.o. male who presents to the ED from [facility].  Patient has Hx of metastatic dz with alleged Kidney involvement.  Patient was recently admitted to [facility] for encephlopathy.  When the patient was discharged from [facility] he allegedly began routine dialysis.  Patient denies anything hurting him at this time.  It is reported that he has been confused and getting worse since this morning.  Exact time of onset unknown.    Nursing home paperwork shows patient is not on anticoagulants.There are not records in Epic on this patient to be reviewed.  Nephrologist is unknown at this time.          PAST MEDICAL HISTORY  Active Ambulatory Problems     Diagnosis Date Noted    No Active Ambulatory Problems     Resolved Ambulatory Problems     Diagnosis Date Noted    No Resolved Ambulatory Problems     No Additional Past Medical History         PAST SURGICAL HISTORY  No past surgical history on file.      FAMILY HISTORY  No family history on file.      SOCIAL HISTORY  Social History     Socioeconomic History    Marital status:          ALLERGIES  Patient has no allergy information on record.        REVIEW OF SYSTEMS  Review of Systems     All systems reviewed and negative except for those discussed in HPI.       PHYSICAL EXAM  ED Triage Vitals   Temp Heart Rate Resp BP SpO2   10/11/23 2147 10/11/23 2147 10/11/23 2153 10/11/23 2147 10/11/23 2147   100.4 °F (38 °C) 93 16 (!) 89/53 92 %      Temp src Heart Rate Source Patient Position BP Location FiO2 (%)   -- -- -- -- --              Physical Exam      GENERAL: Frail in appearance, febrile, hypotensive, in significant distress   HENT: nares patent  EYES: no scleral icterus  CV: regular rhythm, normal rate, no murmur  noted  RESPIRATORY: normal effort  ABDOMEN: soft, nontender  MUSCULOSKELETAL: no deformity  NEURO: alert to self disoriented to date place and time, moves all extremities, follows commands, speech slurred  PSYCH:  cooperative  SKIN: warm, dry    Vital signs and nursing notes reviewed.          LAB RESULTS  Recent Results (from the past 24 hour(s))   ECG 12 Lead Altered Mental Status    Collection Time: 10/11/23  9:50 PM   Result Value Ref Range    QT Interval 401 ms    QTC Interval 507 ms   Comprehensive Metabolic Panel    Collection Time: 10/11/23  9:56 PM    Specimen: Blood   Result Value Ref Range    Glucose 94 65 - 99 mg/dL    BUN 10 8 - 23 mg/dL    Creatinine 2.30 (H) 0.76 - 1.27 mg/dL    Sodium 135 (L) 136 - 145 mmol/L    Potassium 3.1 (L) 3.5 - 5.2 mmol/L    Chloride 95 (L) 98 - 107 mmol/L    CO2 27.5 22.0 - 29.0 mmol/L    Calcium 8.6 8.6 - 10.5 mg/dL    Total Protein 5.5 (L) 6.0 - 8.5 g/dL    Albumin 2.8 (L) 3.5 - 5.2 g/dL    ALT (SGPT) 25 1 - 41 U/L    AST (SGOT) 146 (H) 1 - 40 U/L    Alkaline Phosphatase 547 (H) 39 - 117 U/L    Total Bilirubin 0.9 0.0 - 1.2 mg/dL    Globulin 2.7 gm/dL    A/G Ratio 1.0 g/dL    BUN/Creatinine Ratio 4.3 (L) 7.0 - 25.0    Anion Gap 12.5 5.0 - 15.0 mmol/L    eGFR 31.3 (L) >60.0 mL/min/1.73   Magnesium    Collection Time: 10/11/23  9:56 PM    Specimen: Blood   Result Value Ref Range    Magnesium 1.3 (L) 1.6 - 2.4 mg/dL   High Sensitivity Troponin T    Collection Time: 10/11/23  9:56 PM    Specimen: Blood   Result Value Ref Range    HS Troponin T 678 (C) <15 ng/L   Lactic Acid, Plasma    Collection Time: 10/11/23  9:56 PM    Specimen: Blood   Result Value Ref Range    Lactate 1.8 0.5 - 2.0 mmol/L   Procalcitonin    Collection Time: 10/11/23  9:56 PM    Specimen: Blood   Result Value Ref Range    Procalcitonin 10.60 (H) 0.00 - 0.25 ng/mL   CBC Auto Differential    Collection Time: 10/11/23  9:56 PM    Specimen: Blood   Result Value Ref Range    WBC 18.69 (H) 3.40 - 10.80 10*3/mm3     RBC 3.05 (L) 4.14 - 5.80 10*6/mm3    Hemoglobin 8.3 (L) 13.0 - 17.7 g/dL    Hematocrit 26.2 (L) 37.5 - 51.0 %    MCV 85.9 79.0 - 97.0 fL    MCH 27.2 26.6 - 33.0 pg    MCHC 31.7 31.5 - 35.7 g/dL    RDW 15.0 12.3 - 15.4 %    RDW-SD 47.5 37.0 - 54.0 fl    MPV 9.8 6.0 - 12.0 fL    Platelets 379 140 - 450 10*3/mm3    Neutrophil % 76.7 (H) 42.7 - 76.0 %    Lymphocyte % 13.4 (L) 19.6 - 45.3 %    Monocyte % 4.8 (L) 5.0 - 12.0 %    Eosinophil % 0.6 0.3 - 6.2 %    Basophil % 0.4 0.0 - 1.5 %    Immature Grans % 4.1 (H) 0.0 - 0.5 %    Neutrophils, Absolute 14.33 (H) 1.70 - 7.00 10*3/mm3    Lymphocytes, Absolute 2.50 0.70 - 3.10 10*3/mm3    Monocytes, Absolute 0.90 0.10 - 0.90 10*3/mm3    Eosinophils, Absolute 0.12 0.00 - 0.40 10*3/mm3    Basophils, Absolute 0.07 0.00 - 0.20 10*3/mm3    Immature Grans, Absolute 0.77 (H) 0.00 - 0.05 10*3/mm3    nRBC 0.3 (H) 0.0 - 0.2 /100 WBC   Urinalysis With Microscopic If Indicated (No Culture) - Urine, Catheter    Collection Time: 10/11/23 10:06 PM    Specimen: Urine, Catheter   Result Value Ref Range    Color, UA Yellow Yellow, Straw    Appearance, UA Clear Clear    pH, UA 7.0 5.0 - 8.0    Specific Gravity, UA 1.008 1.005 - 1.030    Glucose, UA Negative Negative    Ketones, UA Negative Negative    Bilirubin, UA Negative Negative    Blood, UA Negative Negative    Protein,  mg/dL (2+) (A) Negative    Leuk Esterase, UA Negative Negative    Nitrite, UA Negative Negative    Urobilinogen, UA 0.2 E.U./dL 0.2 - 1.0 E.U./dL   Urinalysis, Microscopic Only - Urine, Catheter    Collection Time: 10/11/23 10:06 PM    Specimen: Urine, Catheter   Result Value Ref Range    RBC, UA None Seen None Seen, 0-2 /HPF    WBC, UA 6-12 (A) None Seen, 0-2 /HPF    Bacteria, UA Trace (A) None Seen /HPF    Squamous Epithelial Cells, UA 0-2 None Seen, 0-2 /HPF    Hyaline Casts, UA None Seen None Seen /LPF    Methodology Manual Light Microscopy    Respiratory Panel PCR w/COVID-19(SARS-CoV-2)  EL/BARNEY/DYLAN/PAD/COR/MAD/NIGEL In-House, NP Swab in UTM/VTM, 3-4 HR TAT - Swab, Nasopharynx    Collection Time: 10/11/23 10:35 PM    Specimen: Nasopharynx; Swab   Result Value Ref Range    ADENOVIRUS, PCR Not Detected Not Detected    Coronavirus 229E Not Detected Not Detected    Coronavirus HKU1 Not Detected Not Detected    Coronavirus NL63 Not Detected Not Detected    Coronavirus OC43 Not Detected Not Detected    COVID19 Not Detected Not Detected - Ref. Range    Human Metapneumovirus Not Detected Not Detected    Human Rhinovirus/Enterovirus Not Detected Not Detected    Influenza A PCR Not Detected Not Detected    Influenza B PCR Not Detected Not Detected    Parainfluenza Virus 1 Not Detected Not Detected    Parainfluenza Virus 2 Not Detected Not Detected    Parainfluenza Virus 3 Not Detected Not Detected    Parainfluenza Virus 4 Not Detected Not Detected    RSV, PCR Not Detected Not Detected    Bordetella pertussis pcr Not Detected Not Detected    Bordetella parapertussis PCR Not Detected Not Detected    Chlamydophila pneumoniae PCR Not Detected Not Detected    Mycoplasma pneumo by PCR Not Detected Not Detected   High Sensitivity Troponin T 2Hr    Collection Time: 10/12/23 12:37 AM    Specimen: Blood   Result Value Ref Range    HS Troponin T 675 (C) <15 ng/L    Troponin T Delta -3 >=-4 - <+4 ng/L   POC Glucose Once    Collection Time: 10/12/23 12:56 AM    Specimen: Blood   Result Value Ref Range    Glucose 99 70 - 130 mg/dL   MRSA Screen, PCR (Inpatient) - Swab, Nares    Collection Time: 10/12/23  1:59 AM    Specimen: Nares; Swab   Result Value Ref Range    MRSA PCR No MRSA Detected No MRSA Detected   CBC (No Diff)    Collection Time: 10/12/23  2:51 AM    Specimen: Blood   Result Value Ref Range    WBC 22.31 (H) 3.40 - 10.80 10*3/mm3    RBC 3.16 (L) 4.14 - 5.80 10*6/mm3    Hemoglobin 8.7 (L) 13.0 - 17.7 g/dL    Hematocrit 27.1 (L) 37.5 - 51.0 %    MCV 85.8 79.0 - 97.0 fL    MCH 27.5 26.6 - 33.0 pg    MCHC 32.1 31.5 -  35.7 g/dL    RDW 14.9 12.3 - 15.4 %    RDW-SD 46.1 37.0 - 54.0 fl    MPV 9.4 6.0 - 12.0 fL    Platelets 334 140 - 450 10*3/mm3   Comprehensive Metabolic Panel    Collection Time: 10/12/23  2:51 AM    Specimen: Blood   Result Value Ref Range    Glucose 127 (H) 65 - 99 mg/dL    BUN 13 8 - 23 mg/dL    Creatinine 2.51 (H) 0.76 - 1.27 mg/dL    Sodium 135 (L) 136 - 145 mmol/L    Potassium 3.5 3.5 - 5.2 mmol/L    Chloride 97 (L) 98 - 107 mmol/L    CO2 26.0 22.0 - 29.0 mmol/L    Calcium 8.5 (L) 8.6 - 10.5 mg/dL    Total Protein 5.4 (L) 6.0 - 8.5 g/dL    Albumin 2.7 (L) 3.5 - 5.2 g/dL    ALT (SGPT) 36 1 - 41 U/L    AST (SGOT) 235 (H) 1 - 40 U/L    Alkaline Phosphatase 528 (H) 39 - 117 U/L    Total Bilirubin 1.1 0.0 - 1.2 mg/dL    Globulin 2.7 gm/dL    A/G Ratio 1.0 g/dL    BUN/Creatinine Ratio 5.2 (L) 7.0 - 25.0    Anion Gap 12.0 5.0 - 15.0 mmol/L    eGFR 28.2 (L) >60.0 mL/min/1.73   Calcium, Ionized    Collection Time: 10/12/23  5:23 AM    Specimen: Blood   Result Value Ref Range    Ionized Calcium 1.07 (L) 1.15 - 1.35 mmol/L    Ionized Calcium 4.3 (L) 4.6 - 5.4 mg/dL   Lactic Acid, Plasma    Collection Time: 10/12/23  5:24 AM    Specimen: Blood   Result Value Ref Range    Lactate 1.3 0.5 - 2.0 mmol/L   POC Glucose Once    Collection Time: 10/12/23  5:43 AM    Specimen: Blood   Result Value Ref Range    Glucose 128 70 - 130 mg/dL       Ordered the above labs and reviewed the results.        RADIOLOGY  XR Chest 1 View    Result Date: 10/11/2023  EMERGENCY PORTABLE VIEW OF THE CHEST ON 10/11/2023  CLINICAL HISTORY: Altered mental status.  COMPARISON: There are no prior chest x-rays for comparison.  FINDINGS: There is a right internal jugular HemoCath in place. Its distal tip projects over the superior aspect of the right atrium. The cardiomediastinal silhouette is upper limits of normal. The pulmonary vasculature is within normal limits. The lung volumes are low with horizontal platelike atelectasis at the lung bases. The  remainder of the lungs are clear. Costophrenic angles are sharp.      1. No definite active disease is seen in the chest. Lung volumes are low with horizontal platelike atelectasis at the lung bases. There is a right internal jugular HemoCath in place with its tip in the superior aspect of the right atrium.      CT Head Without Contrast    Result Date: 10/11/2023  EMERGENCY CT SCAN OF THE HEAD WITHOUT CONTRAST ON 10/11/2023  CLINICAL HISTORY: Altered mental status and confusion  TECHNIQUE: Spiral CT images were obtained from the base of the skull to the vertex without intravenous contrast. The images were reformatted and are submitted in 3 mm thick axial, sagittal and coronal CT sections with brain algorithm.  COMPARISON: There are no prior head CTs for comparison.  FINDINGS: There is some mild patchy low-density in the frontal periventricular white matter consistent with mild small vessel disease. The remainder of the brain parenchyma is normal in attenuation. There is mild diffuse cerebral atrophy. The ventricles are normal in size. I see no focal mass effect. There is no midline shift. No extra axial fluid collections are identified. There is no evidence of acute intracranial hemorrhage. The calvarium and the skull base are normal in appearance. The paranasal sinuses and the mastoid air cells and the middle ear cavities are clear.      1. No acute intracranial abnormality is identified. There is mild small vessel disease in the periventricular white matter of the cerebral hemispheres. There is mild diffuse cerebral atrophy. The remainder of the head CT is normal. The etiology of the patient's confusion and altered mental status is not established on this exam.  Radiation dose reduction techniques were utilized, including automated exposure control and exposure modulation based on body size.   This report was finalized on 10/11/2023 11:23 PM by Dr. Jeff Vargas M.D on Workstation: [id number]       Ordered the above  noted radiological studies. Reviewed by me in PACS.          PROCEDURES  Critical Care    Performed by: Charlie Avitia III, PA  Authorized by: Douglas Case MD    Critical care provider statement:     Critical care time (minutes):  30    Critical care was necessary to treat or prevent imminent or life-threatening deterioration of the following conditions:  Sepsis    Critical care was time spent personally by me on the following activities:  Blood draw for specimens, development of treatment plan with patient or surrogate, discussions with consultants, evaluation of patient's response to treatment, obtaining history from patient or surrogate, examination of patient, ordering and performing treatments and interventions, ordering and review of laboratory studies, ordering and review of radiographic studies, pulse oximetry, re-evaluation of patient's condition and vascular access procedures (There were no charts in epic to review at the time of my assessment)    I assumed direction of critical care for this patient from another provider in my specialty: yes              MEDICATIONS GIVEN IN ER  Medications   Vancomycin Pharmacy Intermittent/Pulse Dosing (has no administration in time range)   heparin (porcine) injection 5,000 Units (5,000 Units Intracatheter Given 10/12/23 0524)   cefepime 2 gm IVPB in 100 ml NS (VTB) (0 mg Intravenous Stopped 10/11/23 2302)   vancomycin 1750 mg/500 mL 0.9% NS IVPB ([facility]) (1,750 mg Intravenous New Bag 10/11/23 2318)   potassium chloride 10 mEq in 100 mL IVPB (0 mEq Intravenous Stopped 10/12/23 0039)   Hydrocortisone Sod Suc (PF) (Solu-CORTEF) injection 200 mg (200 mg Intravenous Given 10/12/23 0019)   Patient was also given a liter of saline prior to placing on Levophed.  Patient was a dialysis patient and the second liter of fluid was not given due to concerns for fluid overload.      MEDICAL DECISION MAKING, PROGRESS, and CONSULTS    Discussion below represents my  analysis of pertinent findings related to patient's condition, differential diagnosis, treatment plan and final disposition.      Orders placed during this visit:  Orders Placed This Encounter   Procedures    Critical Care    Blood Culture - Blood,    Blood Culture - Blood,    Respiratory Panel PCR w/COVID-19(SARS-CoV-2) EL/BARNEY/DYLAN/PAD/COR/MAD/NIGEL In-House, NP Swab in UTM/VTM, 3-4 HR TAT - Swab, Nasopharynx    Blood Culture - Blood, Blood, Central Line    MRSA Screen, PCR (Inpatient) - Swab, Nares    Urine Culture - Urine,    XR Chest 1 View    CT Head Without Contrast    Comprehensive Metabolic Panel    Urinalysis With Microscopic If Indicated (No Culture) - Urine, Catheter    Magnesium    High Sensitivity Troponin T    Lactic Acid, Plasma    Procalcitonin    CBC Auto Differential    High Sensitivity Troponin T 2Hr    Urinalysis, Microscopic Only - Urine, Clean Catch    CBC (No Diff)    Calcium, Ionized    Comprehensive Metabolic Panel    Lactic Acid, Plasma    Vancomycin, Random    NPO Diet NPO Type: Strict NPO    Obtain medical records    Vital Signs Every Hour and Per Hospital Policy Based on Patient Condition    Telemetry - Place Orders & Notify Provider of Results When Patient Experiences Acute Chest Pain, Dysrhythmia or Respiratory Distress    Continuous Pulse Oximetry    Height & Weight    Daily Weights    Intake & Output    Oral Care - Patient Not on NPPV & Not Intubated    Target Arousal Level RASS 0 to -1    Use Mobility Guidelines for Advancement of Activity    ICU / CCU - Maintain IV Access    ICU / CCU - Place Order Consult Intensivist For Critical Care Management (If Patient Not Admitted to Cardiology for Primary Cardiology Condition)    ICU / CCU - Notify All Physicians When Patient is Transferred    As patient's mental status improves he can have a bedside swallow and if he passes can have a clear liquid diet  Physician Communication Order    Initiation Sequence for Vasoactive Infusions    Weaning  Sequence for Vasoactive Infusions    Code Status and Medical Interventions:    Pulmonology (on-call MD unless specified)    Inpatient Infectious Diseases Consult    Inpatient Cardiology Consult    POC Glucose Once    POC Glucose Q4H    POC Glucose Once    ECG 12 Lead Altered Mental Status    ECG 12 Lead Tachycardia    Adult Transthoracic Echo Complete W/ Cont if Necessary Per Protocol    Wound Ostomy Eval & Treat    Insert Peripheral IV    Insert Peripheral IV    Inpatient Admission    CBC & Differential         Additional sources:  - Discussed/obtained information from independent historians:   EMS gives some history  Additional information was obtained to confirm the patient's history.    - External (non-ED) record review: No records to review            - Chronic or social conditions impacting care: Patient is a poor historian        Differential diagnosis:    Sepsis secondary to UTI, sepsis secondary to PNA, unknown source of septicemia/bacteremia.  Will initiate septic work-up.    Independent interpretation of labs, radiology studies, and discussions with consultants:  ED Course as of 10/12/23 0712   Wed Oct 11, 2023   2151 Temp: 100.4 °F (38 °C) [RC]   2154 BP(!): 89/53 [RC]   2257 Patient has not responded to IV fluids.  He has been given a liter.  He is a dialysis patient.  We will start on Levophed.  Cefepime and vancomycin have already been initiated. [RC]   2257 EKG interpreted by me demonstrates sinus rhythm, rate of 96, no OK prolongation, no ST elevation [MW]   2258 CT head without contrast shows no acute process. [RC]   2315 HS Troponin T(!!): 678 [MW]   2316 Procalcitonin(!): 10.60 [RC]   2329 Discussed patient's case with Dr. Wilcox.  To admit his care in the ICU.  Working diagnosis will be sepsis of an unknown origin [RC]      ED Course User Index  [MW] Douglas Case MD  [RC] Charlie Avitia III, PA               DIAGNOSIS  Final diagnoses:   Septic shock   Hypotension, unspecified  hypotension type         DISPOSITION  ADMISSION    Discussed treatment plan and reason for admission with pt/family and admitting physician.  Pt/family voiced understanding of the plan for admission for further testing/treatment as needed.        Latest Documented Vital Signs:  As of 07:12 EDT  BP- 138/79 HR- 73 Temp- 98.6 °F (37 °C) (Rectal) O2 sat- 95%      --    Please note that portions of this were completed with a voice recognition program.       Note Disclaimer: At [facility], we believe that sharing information builds trust and better relationships. You are receiving this note because you are receiving care at [facility] or recently visited. It is possible you will see health information before a provider has talked with you about it. This kind of information can be easy to misunderstand. To help you fully understand what it means for your health, we urge you to discuss this note with your provider.         Charlie Avitia III, PA  10/12/23 0777

## 2023-10-12 NOTE — PROGRESS NOTES
Lethargic and mumbles responses.    Lungs clear.  Ext no edema.    Labs noted.    Hypotension - improved  Adrenal insufficiency - suspected  ? Sepsis  BURAK vs CKD  Metastatic melanoma  Chronic systemic steroids  DM2  Anemia  Acute hyponatremia  Hypomagnesemia      BP currently holding without pressors.  Continue stress dose steroids.  Appreciate ID input. Check usg Liver.  Appreciate nephrology input, discussed.  Note recent CT imaging at [facility]. Check ammonia.  SS for DM2.  Monitor Hb.    Spoke to his brother Dr. Eddi Cardozo.    Hermann Quiroga MD  10/12/23  13:08 EDT

## 2023-10-12 NOTE — H&P
Patient Care Team:  Provider, No Known as PCP - General      Subjective     Patient is a 63 y.o. male.  Asked to admit for septic shock.  Very limited history he comes from [place] he apparently just left you available on Saturday after a 3-week stay with several episodes of sepsis the etiology was never determined they suspected possibly an aspiration pneumonia.  Unfortunately there are no [facility] records on the care everywhere and calls to [facility] medical records they could not provide anything.  Thankfully his sisters arrived and his brother is an ID physician in [place] and they were able to provide excellent history.  He has metastatic melanoma with liver metastases.  He had been on immunotherapy and that had to be stopped he had a lot of side effects he had checkpoint inhibitor enteritis among others he has been on chronic steroids for this they note that every time he got septic he would get hypotensive and he was responded briskly to steroids.  They have been trying to wean him back off of steroids.  With his previous episodes of sepsis he sustained some acute kidney injury and has been dialyzed he has a temporary tunneled dialysis catheter he had been making urine and they were hopeful that he would not need continued dialysis.  Apparently yesterday he was bright and alert and today he just started getting lethargic and weaker.  He has undergone dialysis the last couple of days.  The patient initially was quite lethargic but he is waking up now and he denies any pain he denies shortness of breath may be a little cough.  He may have had a diarrhea stool today he is not certain.  We have not seen any diarrhea in the emergency department.  He does make urine and he has not had any dysuria or hematuria.  No nausea or vomiting.  He is additionally a diabetic and he had some surgery on his left second toe related to diabetes complications.      Review of Systems:  As above patient is still  "pretty lethargic      History  No past medical history on file.  No past surgical history on file.  Social History     Socioeconomic History    Marital status:      No family history on file.      Allergies:  Patient has no allergy information on record.    Medications:  Prior to Admission medications    Not on File     Hydrocortisone Sod Suc (PF), 200 mg, Intravenous, Once  potassium chloride, 10 mEq, Intravenous, Once  vancomycin, 20 mg/kg, Intravenous, Once      norepinephrine, 0.02-0.3 mcg/kg/min, Last Rate: 0.06 mcg/kg/min (10/11/23 2330)        Objective     Vital Signs  Vital Sign Min/Max for last 24 hours  Temp  Min: 100.4 °F (38 °C)  Max: 100.4 °F (38 °C)   BP  Min: 81/48  Max: 102/56   Pulse  Min: 89  Max: 97   Resp  Min: 16  Max: 16   SpO2  Min: 90 %  Max: 96 %   No data recorded   Weight  Min: 83.9 kg (184 lb 14.4 oz)  Max: 83.9 kg (184 lb 14.4 oz)       Intake/Output Summary (Last 24 hours) at 10/12/2023 0016  Last data filed at 10/11/2023 2302  Gross per 24 hour   Intake 600 ml   Output --   Net 600 ml     I/O this shift:  In: 600 [I.V.:500; IV Piggyback:100]  Out: -   Last Weight and Admission Weight        10/11/23  2153   Weight: 83.9 kg (184 lb 14.4 oz)     Flowsheet Rows      Flowsheet Row First Filed Value   Admission Height 170.2 cm (67\") Documented at 10/11/2023 2153   Admission Weight 83.9 kg (184 lb 14.4 oz) Documented at 10/11/2023 2153            Body mass index is 28.96 kg/m².           Physical Exam:  General Appearance: Well-developed white male he is resting on a stretcher again he is pretty somnolent but we can arouse him he will talk he does move all extremities  Eyes: Conjunctiva are clear and anicteric pupils are about 3 to 3-1/2 mm and equal bilaterally  ENT: Mucous membranes are dry no erythema no exudates, nasal septum midline  Neck: No adenopathy or thyromegaly no jugular venous tension and trachea midline  Lungs: Clear I do not hear wheezes rales or rhonchi  Cardiac: " Regular rate rhythm no murmur no gallop  Abdomen: Soft nontender no palpable hepatosplenomegaly or masses  : Normal external male genitalia  Musculoskeletal: He has looks like may be some sutures in the distal portion of his left second toe that toes a little shorter looks like may be a portion was removed his sister said he had surgery secondary to diabetes complications on that toe.  Right anterior chest tunneled dialysis type catheter the site looks okay  Skin: No jaundice no petechiae I do not see any significant rashes  Neuro: He is waking up apparently was pretty obtunded when he arrived he is answering some questions he does fall asleep easily his answers seem to be appropriate he is moving all 4 extremities to command  Extremities/P Vascular: No clubbing no cyanosis no edema he does have palpable radial and dorsalis pedis pulses now his blood pressure is up he is on Levophed drip currently  MSE: Hard to tell he is very flat and very somnolent currently      Labs:  Results from last 7 days   Lab Units 10/11/23  2156   GLUCOSE mg/dL 94   SODIUM mmol/L 135*   POTASSIUM mmol/L 3.1*   MAGNESIUM mg/dL 1.3*   CO2 mmol/L 27.5   CHLORIDE mmol/L 95*   ANION GAP mmol/L 12.5   CREATININE mg/dL 2.30*   BUN mg/dL 10   BUN / CREAT RATIO  4.3*   CALCIUM mg/dL 8.6   ALK PHOS U/L 547*   TOTAL PROTEIN g/dL 5.5*   ALT (SGPT) U/L 25   AST (SGOT) U/L 146*   BILIRUBIN mg/dL 0.9   ALBUMIN g/dL 2.8*   GLOBULIN gm/dL 2.7     Estimated Creatinine Clearance: 34.5 mL/min (A) (by C-G formula based on SCr of 2.3 mg/dL (H)).      Results from last 7 days   Lab Units 10/11/23  2156   WBC 10*3/mm3 18.69*   RBC 10*6/mm3 3.05*   HEMOGLOBIN g/dL 8.3*   HEMATOCRIT % 26.2*   MCV fL 85.9   MCH pg 27.2   MCHC g/dL 31.7   RDW % 15.0   RDW-SD fl 47.5   MPV fL 9.8   PLATELETS 10*3/mm3 379   NEUTROPHIL % % 76.7*   LYMPHOCYTE % % 13.4*   MONOCYTES % % 4.8*   EOSINOPHIL % % 0.6   BASOPHIL % % 0.4   IMM GRAN % % 4.1*   NEUTROS ABS 10*3/mm3 14.33*    LYMPHS ABS 10*3/mm3 2.50   MONOS ABS 10*3/mm3 0.90   EOS ABS 10*3/mm3 0.12   BASOS ABS 10*3/mm3 0.07   IMMATURE GRANS (ABS) 10*3/mm3 0.77*   NRBC /100 WBC 0.3*         Results from last 7 days   Lab Units 10/11/23  2156   HSTROP T ng/L 678*             Results from last 7 days   Lab Units 10/11/23  2156   LACTATE mmol/L 1.8   PROCALCITONIN ng/mL 10.60*         Microbiology Results (last 10 days)       Procedure Component Value - Date/Time    Respiratory Panel PCR w/COVID-19(SARS-CoV-2) EL/BARNEY/DYLAN/PAD/COR/MAD/NIGEL In-House, NP Swab in UTM/VTM, 3-4 HR TAT - Swab, Nasopharynx [[id number]]  (Normal) Collected: 10/11/23 2235    Lab Status: Final result Specimen: Swab from Nasopharynx Updated: 10/11/23 2343     ADENOVIRUS, PCR Not Detected     Coronavirus 229E Not Detected     Coronavirus HKU1 Not Detected     Coronavirus NL63 Not Detected     Coronavirus OC43 Not Detected     COVID19 Not Detected     Human Metapneumovirus Not Detected     Human Rhinovirus/Enterovirus Not Detected     Influenza A PCR Not Detected     Influenza B PCR Not Detected     Parainfluenza Virus 1 Not Detected     Parainfluenza Virus 2 Not Detected     Parainfluenza Virus 3 Not Detected     Parainfluenza Virus 4 Not Detected     RSV, PCR Not Detected     Bordetella pertussis pcr Not Detected     Bordetella parapertussis PCR Not Detected     Chlamydophila pneumoniae PCR Not Detected     Mycoplasma pneumo by PCR Not Detected    Narrative:      In the setting of a positive respiratory panel with a viral infection PLUS a negative procalcitonin without other underlying concern for bacterial infection, consider observing off antibiotics or discontinuation of antibiotics and continue supportive care. If the respiratory panel is positive for atypical bacterial infection (Bordetella pertussis, Chlamydophila pneumoniae, or Mycoplasma pneumoniae), consider antibiotic de-escalation to target atypical bacterial infection.              Diagnostics:  XR Chest 1  View    Result Date: 10/11/2023  EMERGENCY PORTABLE VIEW OF THE CHEST ON 10/11/2023  CLINICAL HISTORY: Altered mental status.  COMPARISON: There are no prior chest x-rays for comparison.  FINDINGS: There is a right internal jugular HemoCath in place. Its distal tip projects over the superior aspect of the right atrium. The cardiomediastinal silhouette is upper limits of normal. The pulmonary vasculature is within normal limits. The lung volumes are low with horizontal platelike atelectasis at the lung bases. The remainder of the lungs are clear. Costophrenic angles are sharp.      1. No definite active disease is seen in the chest. Lung volumes are low with horizontal platelike atelectasis at the lung bases. There is a right internal jugular HemoCath in place with its tip in the superior aspect of the right atrium.      CT Head Without Contrast    Result Date: 10/11/2023  EMERGENCY CT SCAN OF THE HEAD WITHOUT CONTRAST ON 10/11/2023  CLINICAL HISTORY: Altered mental status and confusion  TECHNIQUE: Spiral CT images were obtained from the base of the skull to the vertex without intravenous contrast. The images were reformatted and are submitted in 3 mm thick axial, sagittal and coronal CT sections with brain algorithm.  COMPARISON: There are no prior head CTs for comparison.  FINDINGS: There is some mild patchy low-density in the frontal periventricular white matter consistent with mild small vessel disease. The remainder of the brain parenchyma is normal in attenuation. There is mild diffuse cerebral atrophy. The ventricles are normal in size. I see no focal mass effect. There is no midline shift. No extra axial fluid collections are identified. There is no evidence of acute intracranial hemorrhage. The calvarium and the skull base are normal in appearance. The paranasal sinuses and the mastoid air cells and the middle ear cavities are clear.      1. No acute intracranial abnormality is identified. There is mild  small vessel disease in the periventricular white matter of the cerebral hemispheres. There is mild diffuse cerebral atrophy. The remainder of the head CT is normal. The etiology of the patient's confusion and altered mental status is not established on this exam.  Radiation dose reduction techniques were utilized, including automated exposure control and exposure modulation based on body size.   This report was finalized on 10/11/2023 11:23 PM by Dr. Jeff Vargas M.D on Workstation: [id number]          Viewed the chest x-ray and he does have a dialysis type catheter is a little basilar atelectasis probably there may be a little haziness in that right upper chest I do not have any old films for comparison    Assessment & Plan     Shock presumably septic apparently he has had several episodes of this with no definitive source identified.  His family has noted that he has been on steroids for many months and they have been weaning him and every time he goes into shock he responds briskly to steroids.  He may have significant adrenal insufficiency I am going to go ahead and give him a stat dose of hydrocortisone.  We will continue vasopressors he is 8 received fluids already in the emergency department may be able to liberalize his fluids a little they were hesitant with his renal dysfunction but he is making urine.  His troponin is up now this may be due to his renal failure also have to put cardiac in the differential for shock causes.  I am going to hold on central line for his vasopressors until I see how he responds to the steroids given the family's report.  Infection certainly suggest the possibility of infection we will panculture blood and urine certainly on the differential would be a pneumonia, line sepsis.  His brother says that they had did an LP and MRIs looking for sources echocardiogram nothing was found at [facility].  I will get infectious disease involved here and the brother infectious disease in [place]  would appreciate a call from our infectious disease.  Again the infection has to leave my differential with his procalcitonin up even with his renal insufficiency is fairly high and he has a significant leukocytosis.  Acute kidney injury with acute renal failure patient has been receiving dialysis apparently had ATN from shock previously but has been making urine fairly good volumes recently according to family.  We will get nephrology involved I am afraid his kidneys may take another hit with this hypotension and vasopressor.  Metastatic melanoma with abdominal nodes and liver metastases he has had several brain MRIs within the last several months and no CNS metastases have been noted.  He has not been on any chemo or immunotherapy for a couple of months due to his illness and previous intolerance to drugs.  Anemia we do not know his baseline family thinks he is chronically low will follow his hemoglobin see no evidence of GI bleed.  Elevated troponin we will follow this EKG does not look like an acute ischemic changes be up related his renal disease he very well could have a non-ST elevation type II MI demand ischemia with his shock.  I think he probably deserves an echocardiogram both to rule out endocarditis and to evaluate LV function.  Hypokalemia mild hesitant to give him too much potassium with his renal dysfunction we will monitor closely  Elevated AST his ALT is normal this could suggest a cardiac event could also just be some ischemic hepatitis or related to his metastases.  Will trend.  Protein calorie malnutrition comes in with a very low albumin  Diabetes mellitus type 2 we will put him on sliding scale insulin until we determine his insulin requirements will use a frequent dosing regimen      Jesus Wilcox Jr, MD  10/12/23  00:16 EDT    Time: Critical care time 76 minutes, 40 minutes of this time was before midnight and 36 minutes was after midnight 10/11 and 10/12/2023

## 2023-10-13 LAB
ALBUMIN SERPL-MCNC: 2.3 G/DL (ref 3.5–5.2)
ALBUMIN/GLOB SERPL: 0.8 G/DL
ALP SERPL-CCNC: 424 U/L (ref 39–117)
ALT SERPL W P-5'-P-CCNC: 28 U/L (ref 1–41)
AMMONIA BLD-SCNC: 30 UMOL/L (ref 16–60)
ANION GAP SERPL CALCULATED.3IONS-SCNC: 14 MMOL/L (ref 5–15)
AST SERPL-CCNC: 103 U/L (ref 1–40)
BACTERIA SPEC AEROBE CULT: NO GROWTH
BASOPHILS # BLD AUTO: 0.05 10*3/MM3 (ref 0–0.2)
BASOPHILS NFR BLD AUTO: 0.2 % (ref 0–1.5)
BILIRUB SERPL-MCNC: 0.4 MG/DL (ref 0–1.2)
BUN SERPL-MCNC: 29 MG/DL (ref 8–23)
BUN/CREAT SERPL: 9.7 (ref 7–25)
CALCIUM SPEC-SCNC: 8.3 MG/DL (ref 8.6–10.5)
CHLORIDE SERPL-SCNC: 99 MMOL/L (ref 98–107)
CO2 SERPL-SCNC: 23 MMOL/L (ref 22–29)
CREAT SERPL-MCNC: 3 MG/DL (ref 0.76–1.27)
DEPRECATED RDW RBC AUTO: 45.4 FL (ref 37–54)
EGFRCR SERPLBLD CKD-EPI 2021: 22.8 ML/MIN/1.73
EOSINOPHIL # BLD AUTO: 0 10*3/MM3 (ref 0–0.4)
EOSINOPHIL NFR BLD AUTO: 0 % (ref 0.3–6.2)
ERYTHROCYTE [DISTWIDTH] IN BLOOD BY AUTOMATED COUNT: 15.1 % (ref 12.3–15.4)
GLOBULIN UR ELPH-MCNC: 2.9 GM/DL
GLUCOSE BLDC GLUCOMTR-MCNC: 151 MG/DL (ref 70–130)
GLUCOSE BLDC GLUCOMTR-MCNC: 161 MG/DL (ref 70–130)
GLUCOSE BLDC GLUCOMTR-MCNC: 172 MG/DL (ref 70–130)
GLUCOSE BLDC GLUCOMTR-MCNC: 208 MG/DL (ref 70–130)
GLUCOSE BLDC GLUCOMTR-MCNC: 238 MG/DL (ref 70–130)
GLUCOSE BLDC GLUCOMTR-MCNC: 276 MG/DL (ref 70–130)
GLUCOSE SERPL-MCNC: 178 MG/DL (ref 65–99)
HBV SURFACE AB SER RIA-ACNC: NORMAL
HBV SURFACE AG SERPL QL IA: NORMAL
HCT VFR BLD AUTO: 23.1 % (ref 37.5–51)
HGB BLD-MCNC: 7.6 G/DL (ref 13–17.7)
IMM GRANULOCYTES # BLD AUTO: 0.54 10*3/MM3 (ref 0–0.05)
IMM GRANULOCYTES NFR BLD AUTO: 2.5 % (ref 0–0.5)
LYMPHOCYTES # BLD AUTO: 1.08 10*3/MM3 (ref 0.7–3.1)
LYMPHOCYTES NFR BLD AUTO: 5 % (ref 19.6–45.3)
MAGNESIUM SERPL-MCNC: 2.2 MG/DL (ref 1.6–2.4)
MCH RBC QN AUTO: 27.6 PG (ref 26.6–33)
MCHC RBC AUTO-ENTMCNC: 32.9 G/DL (ref 31.5–35.7)
MCV RBC AUTO: 84 FL (ref 79–97)
MONOCYTES # BLD AUTO: 0.78 10*3/MM3 (ref 0.1–0.9)
MONOCYTES NFR BLD AUTO: 3.6 % (ref 5–12)
NEUTROPHILS NFR BLD AUTO: 19.08 10*3/MM3 (ref 1.7–7)
NEUTROPHILS NFR BLD AUTO: 88.7 % (ref 42.7–76)
NRBC BLD AUTO-RTO: 0 /100 WBC (ref 0–0.2)
PHOSPHATE SERPL-MCNC: 5.4 MG/DL (ref 2.5–4.5)
PLATELET # BLD AUTO: 295 10*3/MM3 (ref 140–450)
PMV BLD AUTO: 10.2 FL (ref 6–12)
POTASSIUM SERPL-SCNC: 3.3 MMOL/L (ref 3.5–5.2)
PROT SERPL-MCNC: 5.2 G/DL (ref 6–8.5)
QT INTERVAL: 469 MS
QTC INTERVAL: 521 MS
RBC # BLD AUTO: 2.75 10*6/MM3 (ref 4.14–5.8)
SODIUM SERPL-SCNC: 136 MMOL/L (ref 136–145)
URATE SERPL-MCNC: 8.6 MG/DL (ref 3.4–7)
VANCOMYCIN SERPL-MCNC: 21.2 MCG/ML (ref 5–40)
WBC NRBC COR # BLD: 21.53 10*3/MM3 (ref 3.4–10.8)

## 2023-10-13 PROCEDURE — 87340 HEPATITIS B SURFACE AG IA: CPT | Performed by: INTERNAL MEDICINE

## 2023-10-13 PROCEDURE — 82140 ASSAY OF AMMONIA: CPT | Performed by: INTERNAL MEDICINE

## 2023-10-13 PROCEDURE — 82948 REAGENT STRIP/BLOOD GLUCOSE: CPT

## 2023-10-13 PROCEDURE — 99233 SBSQ HOSP IP/OBS HIGH 50: CPT | Performed by: INTERNAL MEDICINE

## 2023-10-13 PROCEDURE — 5A1D70Z PERFORMANCE OF URINARY FILTRATION, INTERMITTENT, LESS THAN 6 HOURS PER DAY: ICD-10-PCS | Performed by: INTERNAL MEDICINE

## 2023-10-13 PROCEDURE — 84100 ASSAY OF PHOSPHORUS: CPT | Performed by: INTERNAL MEDICINE

## 2023-10-13 PROCEDURE — 25010000002 VANCOMYCIN PER 500 MG: Performed by: INTERNAL MEDICINE

## 2023-10-13 PROCEDURE — 63710000001 INSULIN REGULAR HUMAN PER 5 UNITS: Performed by: INTERNAL MEDICINE

## 2023-10-13 PROCEDURE — 25010000002 HEPARIN (PORCINE) PER 1000 UNITS: Performed by: INTERNAL MEDICINE

## 2023-10-13 PROCEDURE — 25010000002 HYDROCORTISONE SOD SUC (PF) 100 MG RECONSTITUTED SOLUTION: Performed by: INTERNAL MEDICINE

## 2023-10-13 PROCEDURE — 25010000002 CEFEPIME PER 500 MG: Performed by: INTERNAL MEDICINE

## 2023-10-13 PROCEDURE — 02PYX3Z REMOVAL OF INFUSION DEVICE FROM GREAT VESSEL, EXTERNAL APPROACH: ICD-10-PCS | Performed by: INTERNAL MEDICINE

## 2023-10-13 PROCEDURE — 85025 COMPLETE CBC W/AUTO DIFF WBC: CPT | Performed by: INTERNAL MEDICINE

## 2023-10-13 PROCEDURE — 93005 ELECTROCARDIOGRAM TRACING: CPT | Performed by: INTERNAL MEDICINE

## 2023-10-13 PROCEDURE — 86706 HEP B SURFACE ANTIBODY: CPT | Performed by: INTERNAL MEDICINE

## 2023-10-13 PROCEDURE — 99232 SBSQ HOSP IP/OBS MODERATE 35: CPT | Performed by: INTERNAL MEDICINE

## 2023-10-13 PROCEDURE — 93010 ELECTROCARDIOGRAM REPORT: CPT | Performed by: INTERNAL MEDICINE

## 2023-10-13 PROCEDURE — 84550 ASSAY OF BLOOD/URIC ACID: CPT | Performed by: INTERNAL MEDICINE

## 2023-10-13 PROCEDURE — 80202 ASSAY OF VANCOMYCIN: CPT | Performed by: INTERNAL MEDICINE

## 2023-10-13 PROCEDURE — 63710000001 INSULIN GLARGINE PER 5 UNITS: Performed by: HOSPITALIST

## 2023-10-13 PROCEDURE — 83735 ASSAY OF MAGNESIUM: CPT | Performed by: INTERNAL MEDICINE

## 2023-10-13 PROCEDURE — 80053 COMPREHEN METABOLIC PANEL: CPT | Performed by: INTERNAL MEDICINE

## 2023-10-13 RX ORDER — COSYNTROPIN 0.25 MG/ML
0.25 INJECTION, POWDER, FOR SOLUTION INTRAMUSCULAR; INTRAVENOUS ONCE
Qty: 0.25 MG | Refills: 0 | Status: COMPLETED | OUTPATIENT
Start: 2023-10-13 | End: 2023-10-14

## 2023-10-13 RX ORDER — CALCIUM CARBONATE 500 MG/1
2 TABLET, CHEWABLE ORAL 3 TIMES DAILY PRN
Status: DISCONTINUED | OUTPATIENT
Start: 2023-10-13 | End: 2023-10-20 | Stop reason: HOSPADM

## 2023-10-13 RX ORDER — FAMOTIDINE 10 MG/ML
20 INJECTION, SOLUTION INTRAVENOUS DAILY
Status: DISCONTINUED | OUTPATIENT
Start: 2023-10-13 | End: 2023-10-14

## 2023-10-13 RX ORDER — MANNITOL 250 MG/ML
12.5 INJECTION, SOLUTION INTRAVENOUS AS NEEDED
Status: CANCELLED | OUTPATIENT
Start: 2023-10-13 | End: 2023-10-14

## 2023-10-13 RX ADMIN — INSULIN HUMAN 8 UNITS: 100 INJECTION, SOLUTION PARENTERAL at 02:10

## 2023-10-13 RX ADMIN — Medication 10 ML: at 09:00

## 2023-10-13 RX ADMIN — INSULIN HUMAN 4 UNITS: 100 INJECTION, SOLUTION PARENTERAL at 04:40

## 2023-10-13 RX ADMIN — HEPARIN SODIUM 5000 UNITS: 5000 INJECTION INTRAVENOUS; SUBCUTANEOUS at 08:59

## 2023-10-13 RX ADMIN — VANCOMYCIN HYDROCHLORIDE 500 MG: 500 INJECTION, POWDER, LYOPHILIZED, FOR SOLUTION INTRAVENOUS at 17:15

## 2023-10-13 RX ADMIN — HYDROCORTISONE SODIUM SUCCINATE 50 MG: 100 INJECTION, POWDER, FOR SOLUTION INTRAMUSCULAR; INTRAVENOUS at 17:16

## 2023-10-13 RX ADMIN — Medication 10 ML: at 21:50

## 2023-10-13 RX ADMIN — HEPARIN SODIUM 5000 UNITS: 5000 INJECTION INTRAVENOUS; SUBCUTANEOUS at 21:49

## 2023-10-13 RX ADMIN — INSULIN HUMAN 12 UNITS: 100 INJECTION, SOLUTION PARENTERAL at 21:49

## 2023-10-13 RX ADMIN — FAMOTIDINE 20 MG: 10 INJECTION INTRAVENOUS at 08:59

## 2023-10-13 RX ADMIN — HYDROCORTISONE SODIUM SUCCINATE 100 MG: 100 INJECTION, POWDER, FOR SOLUTION INTRAMUSCULAR; INTRAVENOUS at 02:00

## 2023-10-13 RX ADMIN — INSULIN HUMAN 4 UNITS: 100 INJECTION, SOLUTION PARENTERAL at 09:00

## 2023-10-13 RX ADMIN — ANTACID TABLETS 2 TABLET: 500 TABLET, CHEWABLE ORAL at 22:22

## 2023-10-13 RX ADMIN — HYDROCORTISONE SODIUM SUCCINATE 100 MG: 100 INJECTION, POWDER, FOR SOLUTION INTRAMUSCULAR; INTRAVENOUS at 08:59

## 2023-10-13 RX ADMIN — INSULIN HUMAN 4 UNITS: 100 INJECTION, SOLUTION PARENTERAL at 17:16

## 2023-10-13 RX ADMIN — HEPARIN SODIUM 3200 UNITS: 1000 INJECTION, SOLUTION INTRAVENOUS; SUBCUTANEOUS at 16:30

## 2023-10-13 RX ADMIN — CEFEPIME 2000 MG: 2 INJECTION, POWDER, FOR SOLUTION INTRAVENOUS at 22:22

## 2023-10-13 RX ADMIN — INSULIN GLARGINE 10 UNITS: 100 INJECTION, SOLUTION SUBCUTANEOUS at 21:50

## 2023-10-13 NOTE — PROGRESS NOTES
"[facility] Clinical Pharmacy Services: Vancomycin Monitoring Note    Delvin Cardozo is a 63 y.o. male who is on day 3/7 of pharmacy to dose vancomycin for Sepsis. Dr. Moody is following.    Previous Vancomycin Dose: intermittent:  LD  10/12 1505  750 mg x 1  Updated Cultures and Sensitivities:   -10/12  BC ng x 24h  -10/12 MRSA screen negative   -10/11  Urine:  pend    Results from last 7 days   Lab Units 10/13/23  0433 10/12/23  1154   VANCOMYCIN RM mcg/mL 21.20 19.40     Vitals/Labs  Ht: 170.2 cm (67\"); Wt: 81 kg (178 lb 9.2 oz)   Temp Readings from Last 1 Encounters:   10/13/23 97.8 °F (36.6 °C) (Oral)     Estimated Creatinine Clearance: 26 mL/min (A) (by C-G formula based on SCr of 3 mg/dL (H)).       Results from last 7 days   Lab Units 10/13/23  0433 10/12/23  0251 10/11/23  2156   CREATININE mg/dL 3.00* 2.51* 2.30*   WBC 10*3/mm3 21.53* 22.31* 18.69*     Assessment/Plan    -ID planning on 1-2 more days of vanc until culture finalized.  -HD ordered today x 3.5 hours. AM Vr is 21.2 mcg/ml. Will redose      Current Vancomycin Dose: redose w/ 500 mg iv x 1 today after HD  Next Level Date and Time: Vanc Random on 10/14 w/ am labs  We will continue to monitor patient changes and renal function     Thank you for involving pharmacy in this patient's care. Please contact pharmacy with any questions or concerns.       Bj Goncalves, [facility]  Clinical Pharmacist            "

## 2023-10-13 NOTE — PROGRESS NOTES
[facility] Progress Note      Patient Name: Delvin Cardozo  : 1960  MRN: [id number]  Primary Care Physician:  Provider, No Known  Date of admission: 10/11/2023    Subjective     Interval History:   Follow-up acute kidney injury    Patient is feeling better, no chest pain or shortness of air, no orthopnea or PND, no nausea or vomiting, no abdominal pain, no dysuria or gross hematuria.  Urine output in the past 24 hours was 500 cc.    Review of Systems:   As noted above    Objective     Vitals:   Temp:  [97.6 °F (36.4 °C)-98 °F (36.7 °C)] 97.8 °F (36.6 °C)  Heart Rate:  [71-92] 77  Resp:  [16-18] 18  BP: (105-166)/() 141/83    Intake/Output Summary (Last 24 hours) at 10/13/2023 0928  Last data filed at 10/12/2023 2303  Gross per 24 hour   Intake 370 ml   Output 500 ml   Net -130 ml       Physical Exam:    General Appearance: alert, awake, chronically ill, no acute distress   Skin: warm and dry  HEENT: oral mucosa normal, nonicteric sclera  Neck: No JVD, he has tunneled dialysis catheter in the right IJ with exit site below the right clavicle.  Lungs: Bilateral rhonchi, breathing effort not labored  Heart: RRR, normal S1 and S2, no S3, no rub  Abdomen: soft, nontender, nondistended, normoactive bowel  : no palpable bladder  Extremities: 2+ lower extremity edema  Neuro: normal speech and mental status     Scheduled Meds:     cefepime, 2,000 mg, Intravenous, Q24H  famotidine, 20 mg, Intravenous, Daily  heparin (porcine), 5,000 Units, Subcutaneous, Q12H  hydrocortisone sodium succinate, 100 mg, Intravenous, Q8H  insulin regular, 4-24 Units, Subcutaneous, Q4H  senna-docusate sodium, 2 tablet, Oral, BID  sodium chloride, 10 mL, Intravenous, Q12H  Vancomycin Pharmacy Intermittent/Pulse Dosing, , Does not apply, Daily      IV Meds:   EPINEPHrine, 0.02-0.3 mcg/kg/min  norepinephrine, 0.02-0.3 mcg/kg/min, Last Rate: Stopped (10/12/23 0400)  Pharmacy to dose vancomycin,   sodium  chloride, 100 mL/hr, Last Rate: 100 mL/hr (10/12/23 0700)  vasopressin, 0.03 Units/min        Results Reviewed:   I have personally reviewed the results from the time of this admission to 10/13/2023 09:28 EDT     Results from last 7 days   Lab Units 10/13/23  0433 10/12/23  0251 10/11/23  2156   SODIUM mmol/L 136 135* 135*   POTASSIUM mmol/L 3.3* 3.5 3.1*   CHLORIDE mmol/L 99 97* 95*   CO2 mmol/L 23.0 26.0 27.5   BUN mg/dL 29* 13 10   CREATININE mg/dL 3.00* 2.51* 2.30*   CALCIUM mg/dL 8.3* 8.5* 8.6   BILIRUBIN mg/dL 0.4 1.1 0.9   ALK PHOS U/L 424* 528* 547*   ALT (SGPT) U/L 28 36 25   AST (SGOT) U/L 103* 235* 146*   GLUCOSE mg/dL 178* 127* 94       Estimated Creatinine Clearance: 26 mL/min (A) (by C-G formula based on SCr of 3 mg/dL (H)).    Results from last 7 days   Lab Units 10/13/23  0433 10/11/23  2156   MAGNESIUM mg/dL 2.2 1.3*   PHOSPHORUS mg/dL 5.4*  --        Results from last 7 days   Lab Units 10/13/23  0433   URIC ACID mg/dL 8.6*       Results from last 7 days   Lab Units 10/13/23  0433 10/12/23  0251 10/11/23  2156   WBC 10*3/mm3 21.53* 22.31* 18.69*   HEMOGLOBIN g/dL 7.6* 8.7* 8.3*   PLATELETS 10*3/mm3 295 334 379             Assessment / Plan     ASSESSMENT:  Acute kidney injury has been requiring dialysis since early September 202 and he was transferred to [facility] and he was dialyzed daily over the SNF.  The etiology of the BURAK probably ATN associated with sepsis, but also he was on immune O checks therapy so concern about GN but it was opted at that time when he was at [facility] not to proceed with kidney biopsy.  Creatinine today up to 3, potassium 3.3, albumin 2.3, uric acid 8.6 patient has fluid excess  Septic shock improved since admission, the concern if he had catheter related sepsis will follow the recommendation of ID.  Metastatic melanoma previously on immunotherapy  Adrenal insufficiency currently on steroid  Hypomagnesemia improved, magnesium up to  two-point    PLAN:  Hemodialysis today  Surveillance labs    I reviewed the chart and other providers notes, I reviewed imaging and lab data.  I discussed the case with the patient and he voiced good understanding  Copied text in this note has been reviewed and is accurate as of 10/13/23.       Thank you for involving us in the care of Delvin Cardozo.  Please feel free to call with any questions.    Antoine Hay MD  10/13/23  09:28 EDT    [facility]  [phone]    Please note that portions of this note were completed with a voice recognition program.

## 2023-10-13 NOTE — PROGRESS NOTES
"Nutrition Services    Patient Name:  Delvin Cardozo  YOB: 1960  MRN: [id number]  Admit Date:  10/11/2023  Assessment Date:  10/13/23    Summary: Nutrition screen completed for skin integrity  Dx:  encephalopathy, septic shock. Hypotensive, Hx of BURAK requiring dialysis, metastatic melanoma with liver metastases  Wounds- left flank, left anterior thigh abrasion, right distal calf abrasion, left second toe arterial ulcer necrotic  Pt on Renal diet, more alert and eating, ate 100% last night for dinner and breakfast this am. Visited pt this am  IVF's at 100ml/hr  Labs K 3.3, glu 151, BUN 29, cr 3.0, phos 5.4    Plan/recommendation  Will add Consistent Carb to diet order  Will continue to follow clinical course and monitor nutritional needs.     CLINICAL NUTRITION ASSESSMENT      Reason for Assessment Pressure Injury and/or Non-Healing Wound     Diagnosis/Problem   encephalopathy, septic shock. Hypotensive   Medical/Surgical History Past Medical History:   Diagnosis Date    BURAK (acute kidney injury)     Diabetes mellitus     Dialysis patient     new onset 2023    Hypertension     Melanoma     Tumor     left flank       No past surgical history on file.     Anthropometrics        Current Height  Current Weight  BMI kg/m2 Height: 170.2 cm (67\")  Weight: 81 kg (178 lb 9.2 oz) (10/12/23 0600)  Body mass index is 27.97 kg/m².   Adjusted BMI (if applicable)    BMI Category Overweight (25 - 29.9)   Ideal Body Weight (IBW) 145lb   Usual Body Weight (UBW) 180's   Weight Trend Stable   Weight History Wt Readings from Last 30 Encounters:   10/12/23 0600 81 kg (178 lb 9.2 oz)   10/12/23 0200 81 kg (178 lb 9.2 oz)   10/11/23 2153 83.9 kg (184 lb 14.4 oz)      --  Labs       Pertinent Labs    Results from last 7 days   Lab Units 10/13/23  0433 10/12/23  0251 10/11/23  2156   SODIUM mmol/L 136 135* 135*   POTASSIUM mmol/L 3.3* 3.5 3.1*   CHLORIDE mmol/L 99 97* 95*   CO2 mmol/L 23.0 26.0 27.5   BUN mg/dL 29* 13 10 " "  CREATININE mg/dL 3.00* 2.51* 2.30*   CALCIUM mg/dL 8.3* 8.5* 8.6   BILIRUBIN mg/dL 0.4 1.1 0.9   ALK PHOS U/L 424* 528* 547*   ALT (SGPT) U/L 28 36 25   AST (SGOT) U/L 103* 235* 146*   GLUCOSE mg/dL 178* 127* 94     Results from last 7 days   Lab Units 10/13/23  0433 10/12/23  0251 10/11/23  2156   MAGNESIUM mg/dL 2.2  --  1.3*   PHOSPHORUS mg/dL 5.4*  --   --    HEMOGLOBIN g/dL 7.6*   < > 8.3*   HEMATOCRIT % 23.1*   < > 26.2*   WBC 10*3/mm3 21.53*   < > 18.69*   ALBUMIN g/dL 2.3*   < > 2.8*    < > = values in this interval not displayed.     Results from last 7 days   Lab Units 10/13/23  0433 10/12/23  0251 10/11/23  2156   PLATELETS 10*3/mm3 295 334 379     COVID19   Date Value Ref Range Status   10/11/2023 Not Detected Not Detected - Ref. Range Final     No results found for: \"HGBA1C\"       Medications           Scheduled Medications cefepime, 2,000 mg, Intravenous, Q24H  famotidine, 20 mg, Intravenous, Daily  heparin (porcine), 5,000 Units, Subcutaneous, Q12H  hydrocortisone sodium succinate, 100 mg, Intravenous, Q8H  insulin regular, 4-24 Units, Subcutaneous, Q4H  senna-docusate sodium, 2 tablet, Oral, BID  sodium chloride, 10 mL, Intravenous, Q12H  Vancomycin Pharmacy Intermittent/Pulse Dosing, , Does not apply, Daily       Infusions EPINEPHrine, 0.02-0.3 mcg/kg/min  norepinephrine, 0.02-0.3 mcg/kg/min, Last Rate: Stopped (10/12/23 0400)  Pharmacy to dose vancomycin,   sodium chloride, 100 mL/hr, Last Rate: 100 mL/hr (10/12/23 0700)  vasopressin, 0.03 Units/min       PRN Medications   acetaminophen **OR** acetaminophen    senna-docusate sodium **AND** polyethylene glycol **AND** bisacodyl **AND** bisacodyl    dextrose    dextrose    glucagon (human recombinant)    heparin (porcine)    nitroglycerin    ondansetron    Pharmacy to dose vancomycin    [COMPLETED] Insert Peripheral IV **AND** sodium chloride    sodium chloride    sodium chloride     Physical Findings          General Findings alert, oriented "   Oral/Mouth Cavity    Edema  3+ (moderate)   Gastrointestinal diarrhea   Skin  other:   left flank, left anterior thigh abrasion, right distal calf abrasion, left second toe arterial ulcer necrotic   Tubes/Drains/Lines dialysis catheter   NFPE Not indicated at this time   --  Estimated/Assessed Needs        Current Weight  Weight: 81 kg (178 lb 9.2 oz) (10/12/23 0600)       Energy Requirements    Weight for Calculation 81 kg   Method for Estimation  30 kcal/kg   EST Needs (kcal/day) 2430       Protein Requirements    Weight for Calculation 81 kg   EST Protein Needs (g/kg) 1.2 - 1.5 gm/kg   EST Daily Needs (g/day)        Fluid Requirements     Method for Estimation 1 mL/kcal    EST Needs (mL/day)      Current Nutrition Orders & Evaluation of Intake       Oral Nutrition     Food Allergies Other: kiwi extract   Current PO Diet Diet: Renal Diets; Low Sodium (2-3g); Texture: Regular Texture (IDDSI 7); Fluid Consistency: Thin (IDDSI 0)   Supplement n/a   PO Evaluation     % PO Intake 100%    Factors Affecting Intake: No factors at this time   --  PES STATEMENT / NUTRITION DIAGNOSIS      Nutrition Dx Problem  Problem: Increased Nutrient Needs  Etiology: Other: wounds    Signs/Symptoms: Report/Observation     NUTRITION INTERVENTION / PLAN OF CARE      Intervention Goal(s) Maintain nutrition status, Reduce/improve symptoms, Meet estimated needs, Maintain intake, and Maintain weight         RD Intervention/Action Interview for preferences and Continue to monitor   --      Prescription/Orders:       PO Diet Add CC to diet      Supplements       Enteral Nutrition       Parenteral Nutrition    New Prescription Ordered? Yes   --      Monitor/Evaluation Per protocol   Discharge Plan/Needs Pending clinical course   --    RD to follow per protocol.      Electronically signed by:  Jessy Ireland RD  10/13/23 09:05 EDT

## 2023-10-13 NOTE — PROGRESS NOTES
ID NOTE    CC: f/u shock    Subj: He looks and feels significantly better. He is up in the chair feeding himself breakfast. He denies any new symptoms or concerns today. He does mention a cancer lesion on the left flank he is concerned might have become infected.     Medications:    Current Facility-Administered Medications:     acetaminophen (TYLENOL) tablet 650 mg, 650 mg, Oral, Q4H PRN **OR** acetaminophen (TYLENOL) suppository 650 mg, 650 mg, Rectal, Q6H PRN, Jesus Wilcox Jr., MD    sennosides-docusate (PERICOLACE) 8.6-50 MG per tablet 2 tablet, 2 tablet, Oral, BID **AND** polyethylene glycol (MIRALAX) packet 17 g, 17 g, Oral, Daily PRN **AND** bisacodyl (DULCOLAX) EC tablet 5 mg, 5 mg, Oral, Daily PRN **AND** bisacodyl (DULCOLAX) suppository 10 mg, 10 mg, Rectal, Daily PRN, Jesus Wilcox Jr., MD    cefepime 2 gm IVPB in 100 ml NS (VTB), 2,000 mg, Intravenous, Q24H, Yahir Moody MD    dextrose (D50W) (25 g/50 mL) IV injection 25 g, 25 g, Intravenous, Q15 Min PRN, Jesus Wilcox Jr., MD    dextrose (GLUTOSE) oral gel 15 g, 15 g, Oral, Q15 Min PRN, Jesus Wilcox Jr., MD    famotidine (PEPCID) injection 20 mg, 20 mg, Intravenous, Daily, Hermann Quiroga MD    glucagon (GLUCAGEN) injection 1 mg, 1 mg, Intramuscular, Q15 Min PRN, Jesus Wilcox Jr., MD    heparin (porcine) 5000 UNIT/ML injection 5,000 Units, 5,000 Units, Subcutaneous, Q12H, Jesus Wilcox Jr., MD, 5,000 Units at 10/13/23 0859    heparin (porcine) injection 5,000 Units, 5,000 Units, Intracatheter, PRN, Jesus Wilcox Jr., MD, 5,000 Units at 10/12/23 0524    Hydrocortisone Sod Suc (PF) (Solu-CORTEF) injection 50 mg, 50 mg, Intravenous, Q8H, Hermann Quiroga MD    insulin regular (humuLIN R,novoLIN R) injection 4-24 Units, 4-24 Units, Subcutaneous, 4x Daily AC & at Bedtime, Hermann Quiroga MD    nitroglycerin (NITROSTAT) SL tablet 0.4 mg, 0.4 mg, Sublingual, Q5 Min PRN, Jesus Wilcox Jr., MD     ondansetron (ZOFRAN) injection 4 mg, 4 mg, Intravenous, Q6H PRN, Jesus Wilcox Jr., MD    Pharmacy to dose vancomycin, , Does not apply, Continuous PRN, Jesus Wilcox Jr., MD    [COMPLETED] Insert Peripheral IV, , , Once **AND** sodium chloride 0.9 % flush 10 mL, 10 mL, Intravenous, PRN, Charlie Avitia III, PA    sodium chloride 0.9 % flush 10 mL, 10 mL, Intravenous, Q12H, Jesus Wilcox Jr., MD, 10 mL at 10/13/23 0900    sodium chloride 0.9 % flush 10 mL, 10 mL, Intravenous, PRN, Jesus Wilcox Jr., MD    sodium chloride 0.9 % infusion 40 mL, 40 mL, Intravenous, PRN, Jesus Wilcox Jr., MD    sodium chloride 0.9 % infusion, 100 mL/hr, Intravenous, Continuous, Jesus Wilcox Jr., MD, Last Rate: 100 mL/hr at 10/12/23 0700, 100 mL/hr at 10/12/23 0700    Vancomycin Pharmacy Intermittent/Pulse Dosing, , Does not apply, Daily, Jesus Wilcox Jr., MD      Objective   Vital Signs   Temp:  [97.6 °F (36.4 °C)-98 °F (36.7 °C)] 97.8 °F (36.6 °C)  Heart Rate:  [71-92] 77  Resp:  [16-18] 18  BP: (105-166)/() 141/83    Physical Exam:   General: awake, alerting, sitting in chair feeding himself breakfast  Eyes: no scleral icterus, wears eyeglasses  ENT: no thrush  Cardiovascular: NR  Respiratory: normal work of breathing on ambient air  GI: Abdomen is soft, no rebound or guarding  :  no Fernandez catheter  MSK: L 2nd toe with distal scab but no erythema or purulence; L flank with cancer lesion that is indurated; there is no purulence. There is one small open area about 0.5 x 1 cm. I cannot express any fluid from it.   Neurological: Alert and oriented x 3  Vasc: R chest HD catheter w/o erythema    Labs:     Lab Results   Component Value Date    WBC 21.53 (H) 10/13/2023    HGB 7.6 (L) 10/13/2023    HCT 23.1 (L) 10/13/2023    MCV 84.0 10/13/2023     10/13/2023       Lab Results   Component Value Date    GLUCOSE 178 (H) 10/13/2023    BUN 29 (H) 10/13/2023    CREATININE 3.00 (H)  10/13/2023    BCR 9.7 10/13/2023    CO2 23.0 10/13/2023    CALCIUM 8.3 (L) 10/13/2023    ALBUMIN 2.3 (L) 10/13/2023     (H) 10/13/2023    ALT 28 10/13/2023     LA 1.3  UA 6-12 WBCs  Procal 10    Microbiology:  10/11  RPP: negative  10/11 BCx: NGTD  10/11 UCx: pending  10/12 MRSA nares: negative  10/12 BCx: NGTD    Radiology:  US liver w/ known metastatic lesions    ASSESSMENT/PLAN:  Septic shock - resolved  Metastatic melanoma previously on immunotherapy  Left flank lesion/wound  Recent left 2nd toe distal amputation  Hemodialysis patient  Adrenal insufficiency  Chronic steroid use  Elevated procalcitonin    He looks remarkably better today. A 180-degree turn in my opinion. He is afebrile. WBC elevated but he is on high-dose steroids for adrenal insufficiency. His cultures are negative. He is not on vasopressors. I will keep him on vancomycin and cefepime for one more day while awaiting cultures but may be able to stop broad spectrum antibiotic therapy tomorrow. I do not think his left flank wound looks infected. Local wound care should be sufficient. It certainly doesn't look bad enough to have led to an ICU admission w/ need for vasopressor therapy. I will continue to monitor it.     Noted plans for HD today.     While the patient is on vancomycin, vancomycin levels will be ordered and reviewed with dose adjustments made as needed. The patient will have a daily creatinine level checked while on vancomycin as part of monitoring this medication.     ID will follow. D/w Dr PHILIPP Quiroga.

## 2023-10-13 NOTE — CASE MANAGEMENT/SOCIAL WORK
Continued Stay Note  [facility]     Patient Name: Delvin Cardozo  MRN: [id number]  Today's Date: 10/13/2023    Admit Date: 10/11/2023    Plan: SNF referrals pending   Discharge Plan       Row Name 10/13/23 1258       Plan    Plan SNF referrals pending    Plan Comments CCP spoke with patient's son Dr. Cardozo ([phone]) over the phone regarding updates on the dc plan. CCP updated patient's son on accepting facilities & facilities that have declined. CCP provided Medicare.gov ratings to patient's son. Son states they are still unsure whether patient will need HD at dc or not. Son states they will discuss options as a family and contact CCP with preference. TERE GAMEZ                   Discharge Codes    No documentation.                       TERE Blair

## 2023-10-13 NOTE — PROGRESS NOTES
"DAILY PROGRESS NOTE  [facility]    Patient Identification:  Name: Delvin Cardozo  Age: 63 y.o.  Sex: male  :  1960  MRN: [id number]         Primary Care Physician: Provider, No Known      Subjective  Consulted for preparation to assume care.  Pleasant 63-year-old gentleman previously treated at [facility] presents from [facility] with hypotension and lethargy.  Admitted with probable septic shock as well as adrenal insufficiency.  Unfortunate there is a significant deficit of past medical records.  He has been cultured and treated with broad-spectrum antibiotics.  Also treated with high-dose IV steroids.  He has had a very good response with return of good blood pressure and presently feeling well again.  He presents with a dialysis port with recent diagnosis of BURAK.  At present there are no acute complaints.    Objective:  General Appearance:  Comfortable, well-appearing, in no acute distress and not in pain.    Vital signs: (most recent): Blood pressure 146/93, pulse 74, temperature 97.9 °F (36.6 °C), temperature source Oral, resp. rate 22, height 170.2 cm (67\"), weight 81 kg (178 lb 9.2 oz), SpO2 98%.    Lungs:  Normal effort and normal respiratory rate.  Breath sounds clear to auscultation.    Heart: Normal rate.  Regular rhythm.    Abdomen: Abdomen is soft and non-distended.  Bowel sounds are normal.   There is no abdominal tenderness.     Extremities: There is dependent edema.  (At least 2+ pretibial pitting edema bilaterally.)  Neurological: Patient is alert and oriented to person, place and time.    Skin:  Warm and dry.                  Vital signs in last 24 hours:  Temp:  [97.6 °F (36.4 °C)-98.5 °F (36.9 °C)] 97.9 °F (36.6 °C)  Heart Rate:  [71-92] 74  Resp:  [16-24] 22  BP: (105-163)/() 146/93    Intake/Output:    Intake/Output Summary (Last 24 hours) at 10/13/2023 1828  Last data filed at 10/13/2023 1637  Gross per 24 hour   Intake 370 ml   Output 2300 ml "   Net -1930 ml         Results from last 7 days   Lab Units 10/13/23  0433 10/12/23  0251 10/11/23  2156   WBC 10*3/mm3 21.53* 22.31* 18.69*   HEMOGLOBIN g/dL 7.6* 8.7* 8.3*   PLATELETS 10*3/mm3 295 334 379     Results from last 7 days   Lab Units 10/13/23  0433 10/12/23  0251 10/11/23  2156   SODIUM mmol/L 136 135* 135*   POTASSIUM mmol/L 3.3* 3.5 3.1*   CHLORIDE mmol/L 99 97* 95*   CO2 mmol/L 23.0 26.0 27.5   BUN mg/dL 29* 13 10   CREATININE mg/dL 3.00* 2.51* 2.30*   GLUCOSE mg/dL 178* 127* 94   Estimated Creatinine Clearance: 26 mL/min (A) (by C-G formula based on SCr of 3 mg/dL (H)).  Results from last 7 days   Lab Units 10/13/23  0433 10/12/23  0251 10/11/23  2156   CALCIUM mg/dL 8.3* 8.5* 8.6   ALBUMIN g/dL 2.3* 2.7* 2.8*   MAGNESIUM mg/dL 2.2  --  1.3*   PHOSPHORUS mg/dL 5.4*  --   --      Results from last 7 days   Lab Units 10/13/23  0433 10/12/23  0251 10/11/23  2156   ALBUMIN g/dL 2.3* 2.7* 2.8*   BILIRUBIN mg/dL 0.4 1.1 0.9   ALK PHOS U/L 424* 528* 547*   AST (SGOT) U/L 103* 235* 146*   ALT (SGPT) U/L 28 36 25       Assessment:  Hypotension/shock: Likely septic shock.  Presently on broad-spectrum antibiotics.  Infect disease input appreciated.  Cultures negative to date.  Reported adrenal insufficiency: Reportedly recently steroid-dependent.  Now on high-dose IV steroids with good response.  Check cosyntropin stimulation studies in the morning.  Also check TSH.  Hyperglycemia-reported diabetes type 2: Continue sliding scale insulin.  Add low-dose Lantus.  Check A1c in a.m.  BURAK/CKD: Nephrology input appreciated.  Dialysis per nephrology.  Edema: Likely multifactorial including fluid overload from resuscitation, renal failure, hypoalbuminemia.  Much as this will be handled via dialysis.  Echocardiogram with good ejection fraction.  Grade 1 diastolic dysfunction.    All problems new to this examiner.    Plan:  Please see above.  Discussed with patient.    Jeffrey Porter MD  10/13/2023  18:28 EDT

## 2023-10-13 NOTE — PROGRESS NOTES
LOS: 2 days   Patient Care Team:  Provider, No Known as PCP - General    Chief Complaint: Follow-up elevated troponin, type II NSTEMI.    Interval History: He looks 100% better today.  He was sitting up in the chair and conversing.  He has had no chest pain, and has no shortness of breath.    Vital Signs:  Temp:  [97.6 °F (36.4 °C)-98.5 °F (36.9 °C)] 98.1 °F (36.7 °C)  Heart Rate:  [71-92] 79  Resp:  [16-24] 24  BP: (105-166)/() 146/93    Intake/Output Summary (Last 24 hours) at 10/13/2023 1629  Last data filed at 10/12/2023 2303  Gross per 24 hour   Intake 370 ml   Output 500 ml   Net -130 ml       Physical Exam:   General Appearance:    No acute distress, alert and oriented x4   Lungs:     Clear to auscultation bilaterally     Heart:    Regular rhythm and normal rate.  No murmurs, gallops, or  rubs.   Abdomen:     Soft, nontender, nondistended.    Extremities:   No clubbing, cyanosis, or edema.     Results Review:    Results from last 7 days   Lab Units 10/13/23  0433   SODIUM mmol/L 136   POTASSIUM mmol/L 3.3*   CHLORIDE mmol/L 99   CO2 mmol/L 23.0   BUN mg/dL 29*   CREATININE mg/dL 3.00*   GLUCOSE mg/dL 178*   CALCIUM mg/dL 8.3*     Results from last 7 days   Lab Units 10/12/23  0037 10/11/23  2156   HSTROP T ng/L 675* 678*     Results from last 7 days   Lab Units 10/13/23  0433   WBC 10*3/mm3 21.53*   HEMOGLOBIN g/dL 7.6*   HEMATOCRIT % 23.1*   PLATELETS 10*3/mm3 295             Results from last 7 days   Lab Units 10/13/23  0433   MAGNESIUM mg/dL 2.2           I reviewed the patient's new clinical results.        Assessment:  1.  Sepsis with septic shock  2.  Altered mental status - resolved  3.  Melanoma, previously on immunotherapy  4.  Recent acute kidney injury, now requiring hemodialysis  5.  Adrenal insufficiency, treated with steroids  6.  Elevated troponin, type II NSTEMI likely secondary to #1 and #4  7.  Anemia of chronic disease  8.  Diabetes    Plan:  -Again, I feel that this is a type II  NSTEMI secondary to sepsis, shock, and renal dysfunction.  This is not consistent with a type I NSTEMI.    -No chest pain or anginal symptoms.  There is no utilization in pursuing an ischemic work-up.    -I reviewed his echocardiogram.  He has normal wall motion with an ejection fraction of 60 to 65%.  This is also very reassuring.    -No active cardiac issues.  Cardiology will sign off for now.  Please call back if needed.    Andrew Nguyen MD  10/13/23  16:29 EDT

## 2023-10-13 NOTE — PLAN OF CARE
Problem: Skin Injury Risk Increased  Goal: Skin Health and Integrity  Outcome: Ongoing, Progressing  Intervention: Optimize Skin Protection  Recent Flowsheet Documentation  Taken 10/13/2023 0800 by Sally Rob RN  Pressure Reduction Techniques:   frequent weight shift encouraged   heels elevated off bed  Head of Bed (HOB) Positioning: HOB at 30 degrees  Pressure Reduction Devices: specialty bed utilized  Skin Protection:   adhesive use limited   incontinence pads utilized   transparent dressing maintained   tubing/devices free from skin contact     Problem: Fall Injury Risk  Goal: Absence of Fall and Fall-Related Injury  Outcome: Ongoing, Progressing  Intervention: Identify and Manage Contributors  Recent Flowsheet Documentation  Taken 10/13/2023 1400 by Sally Rob RN  Medication Review/Management: medications reviewed  Taken 10/13/2023 0800 by Sally Rob RN  Medication Review/Management: medications reviewed  Intervention: Promote Injury-Free Environment  Recent Flowsheet Documentation  Taken 10/13/2023 1800 by Sally Rob RN  Safety Promotion/Fall Prevention: safety round/check completed  Taken 10/13/2023 1700 by Sally Rob RN  Safety Promotion/Fall Prevention: safety round/check completed  Taken 10/13/2023 1600 by Sally Rob RN  Safety Promotion/Fall Prevention: safety round/check completed  Taken 10/13/2023 1500 by aSlly Rob RN  Safety Promotion/Fall Prevention: safety round/check completed  Taken 10/13/2023 1400 by Sally Rob RN  Safety Promotion/Fall Prevention: safety round/check completed  Taken 10/13/2023 1300 by Sally Rob RN  Safety Promotion/Fall Prevention: safety round/check completed  Taken 10/13/2023 1200 by Sally Rob RN  Safety Promotion/Fall Prevention: safety round/check completed  Taken 10/13/2023 1100 by Sally Rob RN  Safety Promotion/Fall Prevention: safety round/check completed  Taken 10/13/2023 1000 by  Rob, Sally, RN  Safety Promotion/Fall Prevention: safety round/check completed  Taken 10/13/2023 0900 by Sally Rob RN  Safety Promotion/Fall Prevention: safety round/check completed  Taken 10/13/2023 0800 by Sally Rob RN  Safety Promotion/Fall Prevention:   activity supervised   assistive device/personal items within reach   clutter free environment maintained   fall prevention program maintained   lighting adjusted   nonskid shoes/slippers when out of bed   safety round/check completed   room organization consistent     Problem: Adjustment to Illness (Sepsis/Septic Shock)  Goal: Optimal Coping  Outcome: Ongoing, Progressing  Intervention: Optimize Psychosocial Adjustment to Illness  Recent Flowsheet Documentation  Taken 10/13/2023 0800 by Sally Rob RN  Supportive Measures:   active listening utilized   positive reinforcement provided   relaxation techniques promoted   self-care encouraged  Family/Support System Care:   self-care encouraged   support provided     Problem: Infection Progression (Sepsis/Septic Shock)  Goal: Absence of Infection Signs and Symptoms  Outcome: Ongoing, Progressing  Intervention: Initiate Sepsis Management  Recent Flowsheet Documentation  Taken 10/13/2023 1800 by Sally Rob RN  Infection Prevention: environmental surveillance performed  Taken 10/13/2023 1700 by Sally Rob RN  Infection Prevention: environmental surveillance performed  Taken 10/13/2023 1600 by Sally Rob RN  Infection Prevention: environmental surveillance performed  Taken 10/13/2023 1500 by Sally Rob RN  Infection Prevention: environmental surveillance performed  Taken 10/13/2023 1400 by Sally Rob RN  Infection Prevention: environmental surveillance performed  Taken 10/13/2023 1300 by Sally Rob RN  Infection Prevention: environmental surveillance performed  Taken 10/13/2023 1200 by Sally Rob RN  Infection Prevention: environmental  surveillance performed  Taken 10/13/2023 1100 by Sally Rob RN  Infection Prevention: environmental surveillance performed  Taken 10/13/2023 1000 by Sally Rob RN  Infection Prevention: environmental surveillance performed  Taken 10/13/2023 0900 by Sally Rob RN  Infection Prevention: environmental surveillance performed  Taken 10/13/2023 0800 by Sally Rob RN  Infection Prevention: environmental surveillance performed  Intervention: Promote Recovery  Recent Flowsheet Documentation  Taken 10/13/2023 1700 by Sally Rob RN  Activity Management: up in chair  Taken 10/13/2023 1400 by Sally Rob RN  Activity Management: up in chair  Taken 10/13/2023 1200 by Sally Rob RN  Activity Management: back to bed  Taken 10/13/2023 1000 by Sally Rob RN  Activity Management: up in chair  Taken 10/13/2023 0800 by Sally Rob RN  Activity Management: ambulated in room     Problem: Glycemic Control Impaired (Sepsis/Septic Shock)  Goal: Blood Glucose Level Within Desired Range  Outcome: Ongoing, Progressing  Intervention: Optimize Glycemic Control  Recent Flowsheet Documentation  Taken 10/13/2023 0800 by Sally Rob RN  Glycemic Management: blood glucose monitored     Problem: Adjustment to Illness (Chronic Kidney Disease)  Goal: Optimal Coping with Chronic Illness  Outcome: Ongoing, Progressing  Intervention: Support Psychosocial Response  Recent Flowsheet Documentation  Taken 10/13/2023 0800 by Sally Rob RN  Supportive Measures:   active listening utilized   positive reinforcement provided   relaxation techniques promoted   self-care encouraged  Family/Support System Care:   self-care encouraged   support provided     Problem: Functional Decline (Chronic Kidney Disease)  Goal: Optimal Functional Ability  Outcome: Ongoing, Progressing  Intervention: Optimize Functional Ability  Recent Flowsheet Documentation  Taken 10/13/2023 1700 by Darron  Sally, RN  Activity Management: up in chair  Taken 10/13/2023 1400 by Sally Rob, RN  Activity Management: up in chair  Taken 10/13/2023 1200 by Sally Rob, RN  Activity Management: back to bed  Taken 10/13/2023 1000 by Sally Rob, RN  Activity Management: up in chair  Taken 10/13/2023 0800 by Sally Rob RN  Activity Management: ambulated in room     Problem: Renal Function Impairment (Chronic Kidney Disease)  Goal: Minimize Renal Failure Effects  Outcome: Ongoing, Progressing  Intervention: Monitor and Support Renal Function  Recent Flowsheet Documentation  Taken 10/13/2023 1400 by Sally Rob, RN  Medication Review/Management: medications reviewed  Taken 10/13/2023 0800 by Sally Rob, RN  Medication Review/Management: medications reviewed

## 2023-10-13 NOTE — PROGRESS NOTES
[facility]  [phone]  Dr. Hermann Quiroga    Subjective:  LOS: 2    Chief Complaint: Hypotension    Much improved today.  Blood pressure is within normal range.  Sitting in a chair out of his bed.  Denies any headache, chest pain, shortness of breath.    Objective   Vital Signs past 24hrs  Temp range: Temp (24hrs), Av.8 °F (36.6 °C), Min:97.6 °F (36.4 °C), Max:98 °F (36.7 °C)    BP range: BP: (105-166)/() 141/83  Pulse range: Heart Rate:  [71-92] 77  Resp rate range: Resp:  [16-18] 18  Device (Oxygen Therapy): room air   Oxygen range:SpO2:  [92 %-98 %] 94 %         Physical Exam  Eyes:      Pupils: Pupils are equal, round, and reactive to light.   Cardiovascular:      Rate and Rhythm: Normal rate and regular rhythm.      Heart sounds: No murmur heard.  Pulmonary:      Effort: Pulmonary effort is normal.      Breath sounds: Normal breath sounds.   Abdominal:      General: Bowel sounds are normal.      Palpations: Abdomen is soft. There is no mass.      Tenderness: There is no abdominal tenderness.   Musculoskeletal:         General: No swelling.   Neurological:      Mental Status: He is alert.       Results Review:    I have reviewed the laboratory and imaging data since the last note by [facility] physician.  My annotations are noted in assessment and plan.      Result Review:  I have personally reviewed the results from last note by [facility] physician to 10/13/2023 09:21 EDT and agree with these findings:  [x]  Laboratory list / accordion  [x]  Microbiology  [x]  Radiology  [x]  EKG/Telemetry   []  Cardiology/Vascular   []  Pathology  []  Old records  []  Other:    Medication Review:  I have reviewed the current MAR.  My annotations are noted in assessment and plan.    cefepime, 2,000 mg, Intravenous, Q24H  famotidine, 20 mg, Intravenous, Daily  heparin (porcine), 5,000 Units, Subcutaneous, Q12H  hydrocortisone sodium succinate, 100 mg, Intravenous, Q8H  insulin regular, 4-24 Units, Subcutaneous,  Q4H  senna-docusate sodium, 2 tablet, Oral, BID  sodium chloride, 10 mL, Intravenous, Q12H  Vancomycin Pharmacy Intermittent/Pulse Dosing, , Does not apply, Daily        EPINEPHrine, 0.02-0.3 mcg/kg/min  norepinephrine, 0.02-0.3 mcg/kg/min, Last Rate: Stopped (10/12/23 0400)  Pharmacy to dose vancomycin,   sodium chloride, 100 mL/hr, Last Rate: 100 mL/hr (10/12/23 0700)  vasopressin, 0.03 Units/min      Lines, Drains & Airways       Active LDAs       Name Placement date Placement time Site Days    Peripheral IV Anterior;Left Forearm --  --  Forearm  --    Peripheral IV 10/11/23 2200 Anterior;Distal;Right Forearm 10/11/23  2200  Forearm  1    Hemodialysis Cath Double --  --  Internal Jugular  --                  Isolation status: No active isolations    Dietary Orders (From admission, onward)       Start     Ordered    10/12/23 1909  Diet: Renal Diets; Low Sodium (2-3g); Texture: Regular Texture (IDDSI 7); Fluid Consistency: Thin (IDDSI 0)  Diet Effective Now        References:    Diet Order [place]   Question Answer Comment   Diets: Renal Diets    Renal Diet: Low Sodium (2-3g)    Texture: Regular Texture (IDDSI 7)    Fluid Consistency: Thin (IDDSI 0)        10/12/23 1908                    PCCM Problems  Hypotension - improved  Adrenal insufficiency - suspected  ? Chronic systemic steroids  ? Sepsis  BURAK vs CKD  Metastatic malignant melanoma  Multiple hepatic lesions, likely metastases  Cholelithiasis  DM2  Anemia  Acute hyponatremia, improved  Hypomagnesemia, improved  Acute hypokalemia        Plan of Treatment    Hypotension improved/resolved.  No pressors.  Etiology uncertain at this point but adrenal insufficiency highly suspected.  Remains on antibiotics per ID due to concern for sepsis/septic shock.  Await further ID input today.  So far cultures are no growth.    Patient has been on chronic systemic steroids.  I cannot tell if he has been receiving p.o. prednisone even on the days that he does not receive  chemotherapy.  Patient is unable to clarify.  It is suspected that he has not been getting sufficient supplemental steroid replacement possibly resulting in hypotension.  Currently on 100 mg every 8 hours hydrocortisone and I will reduce to 50 mg every 8 hours.    BURAK versus CKD.  Has been on hemodialysis recently at [facility].  Still with tunneled catheter.  Await further nephrology input today.    Metastatic malignant melanoma.  Multiple hepatic lesion seen on liver ultrasound.  He follows with [facility].    Cholelithiasis also seen on liver ultrasound.  However without evidence of cholecystitis at this time though gallbladder wall thickening is upper limit of normal.    On sliding scale for diabetes type 2.    We will clarify home meds, then resume appropriately.    Anemia but appears stable.    Replace electrolytes.    Transfer out.        Hermann Quiroga MD  10/13/23  09:21 EDT      Part of this note may be an electronic transcription/translation of spoken language to printed text using the Dragon Dictation System.

## 2023-10-13 NOTE — PROGRESS NOTES
Appreciate A accepting patient under their care. Intensivist team will sign off.    Hermann Quiroga MD  10/13/23  19:49 EDT

## 2023-10-14 LAB
ALBUMIN SERPL-MCNC: 2.7 G/DL (ref 3.5–5.2)
ALBUMIN/GLOB SERPL: 0.9 G/DL
ALP SERPL-CCNC: 431 U/L (ref 39–117)
ALT SERPL W P-5'-P-CCNC: 26 U/L (ref 1–41)
AMMONIA BLD-SCNC: 20 UMOL/L (ref 16–60)
ANION GAP SERPL CALCULATED.3IONS-SCNC: 14 MMOL/L (ref 5–15)
AST SERPL-CCNC: 62 U/L (ref 1–40)
BASOPHILS # BLD AUTO: 0.01 10*3/MM3 (ref 0–0.2)
BASOPHILS NFR BLD AUTO: 0.1 % (ref 0–1.5)
BILIRUB SERPL-MCNC: 0.4 MG/DL (ref 0–1.2)
BUN SERPL-MCNC: 21 MG/DL (ref 8–23)
BUN/CREAT SERPL: 9.1 (ref 7–25)
CALCIUM SPEC-SCNC: 8.6 MG/DL (ref 8.6–10.5)
CHLORIDE SERPL-SCNC: 100 MMOL/L (ref 98–107)
CO2 SERPL-SCNC: 23 MMOL/L (ref 22–29)
CORTIS SERPL-MCNC: 47.2 MCG/DL
CORTIS SERPL-MCNC: 58.5 MCG/DL
CORTIS SERPL-MCNC: 59.3 MCG/DL
CREAT SERPL-MCNC: 2.32 MG/DL (ref 0.76–1.27)
DEPRECATED RDW RBC AUTO: 45.2 FL (ref 37–54)
EGFRCR SERPLBLD CKD-EPI 2021: 31 ML/MIN/1.73
EOSINOPHIL # BLD AUTO: 0.01 10*3/MM3 (ref 0–0.4)
EOSINOPHIL NFR BLD AUTO: 0.1 % (ref 0.3–6.2)
ERYTHROCYTE [DISTWIDTH] IN BLOOD BY AUTOMATED COUNT: 15 % (ref 12.3–15.4)
GLOBULIN UR ELPH-MCNC: 2.9 GM/DL
GLUCOSE BLDC GLUCOMTR-MCNC: 137 MG/DL (ref 70–130)
GLUCOSE BLDC GLUCOMTR-MCNC: 142 MG/DL (ref 70–130)
GLUCOSE BLDC GLUCOMTR-MCNC: 163 MG/DL (ref 70–130)
GLUCOSE BLDC GLUCOMTR-MCNC: 173 MG/DL (ref 70–130)
GLUCOSE BLDC GLUCOMTR-MCNC: 200 MG/DL (ref 70–130)
GLUCOSE SERPL-MCNC: 144 MG/DL (ref 65–99)
HBA1C MFR BLD: 6.9 % (ref 4.8–5.6)
HCT VFR BLD AUTO: 22.7 % (ref 37.5–51)
HGB BLD-MCNC: 7.4 G/DL (ref 13–17.7)
IMM GRANULOCYTES # BLD AUTO: 0.5 10*3/MM3 (ref 0–0.05)
IMM GRANULOCYTES NFR BLD AUTO: 2.7 % (ref 0–0.5)
LYMPHOCYTES # BLD AUTO: 0.93 10*3/MM3 (ref 0.7–3.1)
LYMPHOCYTES NFR BLD AUTO: 5 % (ref 19.6–45.3)
MAGNESIUM SERPL-MCNC: 2.1 MG/DL (ref 1.6–2.4)
MCH RBC QN AUTO: 27.4 PG (ref 26.6–33)
MCHC RBC AUTO-ENTMCNC: 32.6 G/DL (ref 31.5–35.7)
MCV RBC AUTO: 84.1 FL (ref 79–97)
MONOCYTES # BLD AUTO: 0.65 10*3/MM3 (ref 0.1–0.9)
MONOCYTES NFR BLD AUTO: 3.5 % (ref 5–12)
NEUTROPHILS NFR BLD AUTO: 16.42 10*3/MM3 (ref 1.7–7)
NEUTROPHILS NFR BLD AUTO: 88.6 % (ref 42.7–76)
NRBC BLD AUTO-RTO: 0.1 /100 WBC (ref 0–0.2)
PHOSPHATE SERPL-MCNC: 3.2 MG/DL (ref 2.5–4.5)
PLATELET # BLD AUTO: 284 10*3/MM3 (ref 140–450)
PMV BLD AUTO: 9.8 FL (ref 6–12)
POTASSIUM SERPL-SCNC: 3.5 MMOL/L (ref 3.5–5.2)
PROT SERPL-MCNC: 5.6 G/DL (ref 6–8.5)
RBC # BLD AUTO: 2.7 10*6/MM3 (ref 4.14–5.8)
SODIUM SERPL-SCNC: 137 MMOL/L (ref 136–145)
TSH SERPL DL<=0.05 MIU/L-ACNC: 4.33 UIU/ML (ref 0.27–4.2)
VANCOMYCIN SERPL-MCNC: 20.5 MCG/ML (ref 5–40)
WBC NRBC COR # BLD: 18.52 10*3/MM3 (ref 3.4–10.8)

## 2023-10-14 PROCEDURE — 82533 TOTAL CORTISOL: CPT | Performed by: HOSPITALIST

## 2023-10-14 PROCEDURE — 83735 ASSAY OF MAGNESIUM: CPT | Performed by: INTERNAL MEDICINE

## 2023-10-14 PROCEDURE — 25810000003 SODIUM CHLORIDE 0.9 % SOLUTION: Performed by: INTERNAL MEDICINE

## 2023-10-14 PROCEDURE — 25010000002 COSYNTROPIN PER 0.25 MG: Performed by: HOSPITALIST

## 2023-10-14 PROCEDURE — 63710000001 PREDNISONE PER 1 MG: Performed by: HOSPITALIST

## 2023-10-14 PROCEDURE — 25010000002 HEPARIN (PORCINE) PER 1000 UNITS: Performed by: INTERNAL MEDICINE

## 2023-10-14 PROCEDURE — 99232 SBSQ HOSP IP/OBS MODERATE 35: CPT | Performed by: INTERNAL MEDICINE

## 2023-10-14 PROCEDURE — 85025 COMPLETE CBC W/AUTO DIFF WBC: CPT | Performed by: INTERNAL MEDICINE

## 2023-10-14 PROCEDURE — 83036 HEMOGLOBIN GLYCOSYLATED A1C: CPT | Performed by: HOSPITALIST

## 2023-10-14 PROCEDURE — 80053 COMPREHEN METABOLIC PANEL: CPT | Performed by: INTERNAL MEDICINE

## 2023-10-14 PROCEDURE — 80202 ASSAY OF VANCOMYCIN: CPT | Performed by: INTERNAL MEDICINE

## 2023-10-14 PROCEDURE — 84100 ASSAY OF PHOSPHORUS: CPT | Performed by: INTERNAL MEDICINE

## 2023-10-14 PROCEDURE — 63710000001 INSULIN GLARGINE PER 5 UNITS: Performed by: HOSPITALIST

## 2023-10-14 PROCEDURE — 63710000001 INSULIN REGULAR HUMAN PER 5 UNITS: Performed by: INTERNAL MEDICINE

## 2023-10-14 PROCEDURE — 25010000002 HYDROCORTISONE SOD SUC (PF) 100 MG RECONSTITUTED SOLUTION: Performed by: INTERNAL MEDICINE

## 2023-10-14 PROCEDURE — 82948 REAGENT STRIP/BLOOD GLUCOSE: CPT

## 2023-10-14 PROCEDURE — 82140 ASSAY OF AMMONIA: CPT | Performed by: INTERNAL MEDICINE

## 2023-10-14 PROCEDURE — 84443 ASSAY THYROID STIM HORMONE: CPT | Performed by: HOSPITALIST

## 2023-10-14 RX ORDER — PANTOPRAZOLE SODIUM 40 MG/1
40 TABLET, DELAYED RELEASE ORAL
Status: DISCONTINUED | OUTPATIENT
Start: 2023-10-14 | End: 2023-10-20 | Stop reason: HOSPADM

## 2023-10-14 RX ORDER — UREA 10 %
3 LOTION (ML) TOPICAL NIGHTLY PRN
Status: DISCONTINUED | OUTPATIENT
Start: 2023-10-14 | End: 2023-10-20 | Stop reason: HOSPADM

## 2023-10-14 RX ORDER — PREDNISONE 20 MG/1
20 TABLET ORAL 2 TIMES DAILY WITH MEALS
Status: DISCONTINUED | OUTPATIENT
Start: 2023-10-14 | End: 2023-10-15

## 2023-10-14 RX ADMIN — HEPARIN SODIUM 5000 UNITS: 5000 INJECTION INTRAVENOUS; SUBCUTANEOUS at 08:47

## 2023-10-14 RX ADMIN — Medication 10 ML: at 21:18

## 2023-10-14 RX ADMIN — PANTOPRAZOLE SODIUM 40 MG: 40 TABLET, DELAYED RELEASE ORAL at 17:39

## 2023-10-14 RX ADMIN — PREDNISONE 20 MG: 20 TABLET ORAL at 17:39

## 2023-10-14 RX ADMIN — Medication 3 MG: at 22:38

## 2023-10-14 RX ADMIN — HYDROCORTISONE SODIUM SUCCINATE 50 MG: 100 INJECTION, POWDER, FOR SOLUTION INTRAMUSCULAR; INTRAVENOUS at 00:23

## 2023-10-14 RX ADMIN — COSYNTROPIN 0.25 MG: 0.25 INJECTION, POWDER, LYOPHILIZED, FOR SOLUTION INTRAMUSCULAR; INTRAVENOUS at 04:34

## 2023-10-14 RX ADMIN — INSULIN GLARGINE 10 UNITS: 100 INJECTION, SOLUTION SUBCUTANEOUS at 21:18

## 2023-10-14 RX ADMIN — SODIUM CHLORIDE 100 ML/HR: 9 INJECTION, SOLUTION INTRAVENOUS at 04:34

## 2023-10-14 RX ADMIN — INSULIN HUMAN 4 UNITS: 100 INJECTION, SOLUTION PARENTERAL at 11:58

## 2023-10-14 RX ADMIN — HEPARIN SODIUM 5000 UNITS: 5000 INJECTION INTRAVENOUS; SUBCUTANEOUS at 21:19

## 2023-10-14 RX ADMIN — HYDROCORTISONE SODIUM SUCCINATE 50 MG: 100 INJECTION, POWDER, FOR SOLUTION INTRAMUSCULAR; INTRAVENOUS at 08:47

## 2023-10-14 RX ADMIN — INSULIN HUMAN 4 UNITS: 100 INJECTION, SOLUTION PARENTERAL at 21:26

## 2023-10-14 RX ADMIN — FAMOTIDINE 20 MG: 10 INJECTION INTRAVENOUS at 08:47

## 2023-10-14 RX ADMIN — SENNOSIDES AND DOCUSATE SODIUM 2 TABLET: 50; 8.6 TABLET ORAL at 08:47

## 2023-10-14 RX ADMIN — Medication 10 ML: at 08:49

## 2023-10-14 NOTE — PLAN OF CARE
Goal Outcome Evaluation:         Pt transferred from critical care. Ivf. Slept well overnight. Tums given prn. Iv abx. Cortisol labs in progress

## 2023-10-14 NOTE — PROGRESS NOTES
ID NOTE    CC: f/u shock    Subj: He looks and feels significantly better again today. He is out of the ICU. He is normotensive. No fever. Cultures negative.     Medications:    Current Facility-Administered Medications:     acetaminophen (TYLENOL) tablet 650 mg, 650 mg, Oral, Q4H PRN **OR** acetaminophen (TYLENOL) suppository 650 mg, 650 mg, Rectal, Q6H PRN, Jesus Wilcox Jr., MD    sennosides-docusate (PERICOLACE) 8.6-50 MG per tablet 2 tablet, 2 tablet, Oral, BID, 2 tablet at 10/14/23 0847 **AND** polyethylene glycol (MIRALAX) packet 17 g, 17 g, Oral, Daily PRN **AND** bisacodyl (DULCOLAX) EC tablet 5 mg, 5 mg, Oral, Daily PRN **AND** bisacodyl (DULCOLAX) suppository 10 mg, 10 mg, Rectal, Daily PRN, Jesus Wilcox Jr., MD    calcium carbonate (TUMS) chewable tablet 500 mg (200 mg elemental), 2 tablet, Oral, TID PRN, Doreen Kraus APRN, 2 tablet at 10/13/23 2222    dextrose (D50W) (25 g/50 mL) IV injection 25 g, 25 g, Intravenous, Q15 Min PRN, Jesus Wilcox Jr., MD    dextrose (GLUTOSE) oral gel 15 g, 15 g, Oral, Q15 Min PRN, Jesus Wilcox Jr., MD    glucagon (GLUCAGEN) injection 1 mg, 1 mg, Intramuscular, Q15 Min PRN, Jesus Wilcox Jr., MD    heparin (porcine) 5000 UNIT/ML injection 5,000 Units, 5,000 Units, Subcutaneous, Q12H, Jesus Wilcox Jr., MD, 5,000 Units at 10/14/23 0847    heparin (porcine) injection 5,000 Units, 5,000 Units, Intracatheter, PRN, Jesus Wilcox Jr., MD, 3,200 Units at 10/13/23 1630    insulin glargine (LANTUS, SEMGLEE) injection 10 Units, 10 Units, Subcutaneous, Nightly, [place], Jeffrey J., MD, 10 Units at 10/13/23 2150    insulin regular (humuLIN R,novoLIN R) injection 4-24 Units, 4-24 Units, Subcutaneous, 4x Daily AC & at Bedtime, Hermann Quiroga MD, 4 Units at 10/14/23 1158    nitroglycerin (NITROSTAT) SL tablet 0.4 mg, 0.4 mg, Sublingual, Q5 Min PRN, Jesus Wilcox Jr., MD    ondansetron (ZOFRAN) injection 4 mg, 4 mg, Intravenous,  Q6H PRN, Jesus Wilcox Jr., MD    pantoprazole (PROTONIX) EC tablet 40 mg, 40 mg, Oral, BID AC, Jeffrey Porter MD    predniSONE (DELTASONE) tablet 20 mg, 20 mg, Oral, BID With Meals, Jeffrey Porter MD    [COMPLETED] Insert Peripheral IV, , , Once **AND** sodium chloride 0.9 % flush 10 mL, 10 mL, Intravenous, PRN, Charlie Avitia III, PA    sodium chloride 0.9 % flush 10 mL, 10 mL, Intravenous, Q12H, Jesus Wilcox Jr., MD, 10 mL at 10/14/23 0849    sodium chloride 0.9 % flush 10 mL, 10 mL, Intravenous, PRN, Jesus Wilcox Jr., MD    sodium chloride 0.9 % infusion 40 mL, 40 mL, Intravenous, PRN, Jesus Wilcox Jr., MD      Objective   Vital Signs   Temp:  [97.3 °F (36.3 °C)-98.2 °F (36.8 °C)] 98.2 °F (36.8 °C)  Heart Rate:  [74-86] 75  Resp:  [16-22] 16  BP: (126-176)/() 176/90    Physical Exam:   General: awake, alerting, sitting in bed watching the news  Eyes: no scleral icterus, wears eyeglasses  ENT: no thrush  Cardiovascular: NR  Respiratory: normal work of breathing on RA  GI: Abdomen is soft, no rebound or guarding  :  no Fernandez catheter  MSK: L 2nd toe with distal scab but no erythema or purulence; L flank with cancer lesion that is indurated; there is no purulence. There is one small open area about 0.5 x 1 cm. I cannot express any fluid from it.   Neurological: Alert and oriented x 3  Vasc: R chest HD catheter     Labs:   CBC, CMP, and blood and urine culture reviewed today  Lab Results   Component Value Date    WBC 18.52 (H) 10/14/2023    HGB 7.4 (L) 10/14/2023    HCT 22.7 (L) 10/14/2023    MCV 84.1 10/14/2023     10/14/2023       Lab Results   Component Value Date    GLUCOSE 144 (H) 10/14/2023    BUN 21 10/14/2023    CREATININE 2.32 (H) 10/14/2023    BCR 9.1 10/14/2023    CO2 23.0 10/14/2023    CALCIUM 8.6 10/14/2023    ALBUMIN 2.7 (L) 10/14/2023    AST 62 (H) 10/14/2023    ALT 26 10/14/2023     Lab Results   Component Value Date    HGBA1C 6.90 (H)  10/14/2023     Mg 2.1  UA 6-12 WBCs  Procal 10  Cortisol 58  NH4 - 20    Microbiology:  10/11  RPP: negative  10/11 BCx: NGTD  10/11 UCx: negative  10/12 MRSA nares: negative  10/12 BCx: NGTD    Prior radiology:  US liver w/ known metastatic lesions    ASSESSMENT/PLAN:  Shock - resolved  Metastatic melanoma previously on immunotherapy  Left flank lesion/wound  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  Chronic steroid use  Elevated procalcitonin    He is stable. He is out of the ICU. No fever. Normal BP. His cultures are negative. I will stop antibiotics and observe. Primary team is working up adrenal insufficiency. If any symptoms concerning for recurrent infection, I recommend that we resume cefepime and scan his chest, abdomen, and pelvis.     ID will follow.

## 2023-10-14 NOTE — NURSING NOTE
Call placed to [facility] medical records- spoke to someone stating will fax the record over- no fax ever received.  Called back- no answer.

## 2023-10-14 NOTE — CONSULTS
"Nutrition Services    Patient Name:  Delvin Cardozo  YOB: 1960  MRN: [id number]  Admit Date:  10/11/2023    Nutrition Follow Up    Assessment Date:  10/14/23    Encounter Information         Current Summary Per MD ok to liberalize diet due to lack of hunger. Supplement added TID     Current Nutrition Orders & Evaluation of Intake       Oral Nutrition     Current PO Diet Diet: Diabetic Diets; Consistent Carbohydrate; Texture: Regular Texture (IDDSI 7); Fluid Consistency: Thin (IDDSI 0)   Supplement Novasource Renal    PO Evaluation     PO Intake %     Factors Affecting Intake  decreased appetite   --  Anthropometrics          Height    Weight Height: 170.2 cm (67\")  Weight: 81.5 kg (179 lb 9.6 oz) (10/14/23 0545)    BMI kg/m2 Body mass index is 28.13 kg/m².  Overweight (25 - 29.9)    Weight trend      Labs        Pertinent Labs Reviewed, listed below     Results from last 7 days   Lab Units 10/14/23  0319 10/13/23  0433 10/12/23  0251   SODIUM mmol/L 137 136 135*   POTASSIUM mmol/L 3.5 3.3* 3.5   CHLORIDE mmol/L 100 99 97*   CO2 mmol/L 23.0 23.0 26.0   BUN mg/dL 21 29* 13   CREATININE mg/dL 2.32* 3.00* 2.51*   CALCIUM mg/dL 8.6 8.3* 8.5*   BILIRUBIN mg/dL 0.4 0.4 1.1   ALK PHOS U/L 431* 424* 528*   ALT (SGPT) U/L 26 28 36   AST (SGOT) U/L 62* 103* 235*   GLUCOSE mg/dL 144* 178* 127*       Results from last 7 days   Lab Units 10/14/23  0319 10/13/23  0433 10/12/23  0251 10/11/23  2156   MAGNESIUM mg/dL 2.1 2.2  --  1.3*   PHOSPHORUS mg/dL 3.2 5.4*   < >  --    HEMOGLOBIN g/dL 7.4* 7.6*   < > 8.3*   HEMATOCRIT % 22.7* 23.1*   < > 26.2*   WBC 10*3/mm3 18.52* 21.53*   < > 18.69*   ALBUMIN g/dL 2.7* 2.3*   < > 2.8*    < > = values in this interval not displayed.     Results from last 7 days   Lab Units 10/14/23  0319 10/13/23  0433 10/12/23  0251 10/11/23  2156   PLATELETS 10*3/mm3 284 295 334 379     COVID19   Date Value Ref Range Status   10/11/2023 Not Detected Not Detected - Ref. Range Final     Lab " Results   Component Value Date    HGBA1C 6.90 (H) 10/14/2023          Medications            Scheduled Medications heparin (porcine), 5,000 Units, Subcutaneous, Q12H  insulin glargine, 10 Units, Subcutaneous, Nightly  insulin regular, 4-24 Units, Subcutaneous, 4x Daily AC & at Bedtime  pantoprazole, 40 mg, Oral, BID AC  predniSONE, 20 mg, Oral, BID With Meals  senna-docusate sodium, 2 tablet, Oral, BID  sodium chloride, 10 mL, Intravenous, Q12H        Infusions      PRN Medications   acetaminophen **OR** acetaminophen    senna-docusate sodium **AND** polyethylene glycol **AND** bisacodyl **AND** bisacodyl    calcium carbonate    dextrose    dextrose    glucagon (human recombinant)    heparin (porcine)    nitroglycerin    ondansetron    [COMPLETED] Insert Peripheral IV **AND** sodium chloride    sodium chloride    sodium chloride     Physical Findings          General Appearance alert   Oral/Mouth Cavity WNL   Edema  2+ (mild)   Gastrointestinal last bowel movement: 10/13   Skin  bruising, other:    left flank, left anterior thigh abrasion, right distal calf abrasion, left second toe arterial ulcer necrotic    Tubes/Drains/Lines dialysis catheter   NFPE Not indicated at this time   --  NUTRITION INTERVENTION / PLAN OF CARE  Intervention Goal         Intervention Goal(s) Reduce/improve symptoms, Disease management/therapy, Tolerate PO , Increase intake, No significant weight loss, and PO intake goal %: 75+     Nutrition Intervention         RD Action Liberalize or adjust diet to: consistent carb, Supplement provided, Encourage intake, and Continue to monitor     Nutrition Prescription         Diet Prescription     Supplement Prescription Novasource Renal    EN/PN Prescription    New Prescription Ordered? Yes   --  Monitor/Evaluation        Monitor Per protocol   Discharge Needs Pending clinical course     RD to follow up per protocol.    Electronically signed by:  Jessy Ireland RD  10/14/23 13:50 EDT

## 2023-10-14 NOTE — PROGRESS NOTES
[facility] Progress Note      Patient Name: Delvin Cardozo  : 1960  MRN: [id number]  Primary Care Physician:  Provider, No Known  Date of admission: 10/11/2023    Subjective     Interval History:   Follow-up acute kidney injury  No new events  Dialyzed yesterday without issue  Not eating that well due to lack of hunger.  Review of Systems:   As noted above    Objective     Vitals:   Temp:  [97.3 °F (36.3 °C)-98.5 °F (36.9 °C)] 97.3 °F (36.3 °C)  Heart Rate:  [71-86] 75  Resp:  [18-24] 18  BP: (126-163)/() 126/69    Intake/Output Summary (Last 24 hours) at 10/14/2023 0718  Last data filed at 10/13/2023 1900  Gross per 24 hour   Intake 100 ml   Output 2000 ml   Net -1900 ml       Physical Exam:    General Appearance: alert, awake, chronically ill, no acute distress.  Pale.  Hard of hearing.  Skin: warm and dry  HEENT: oral mucosa normal, nonicteric sclera  Neck: No JVD, he has tunneled dialysis catheter in the right IJ with exit site below the right clavicle.  Lungs: Bilateral rhonchi, breathing effort not labored  Heart: RRR, normal S1 and S2, no S3, no rub  Abdomen: soft, nontender, nondistended, normoactive bowel  : no palpable bladder  Extremities: 2+ lower extremity edema  Neuro: normal speech and mental status     Scheduled Meds:     cefepime, 2,000 mg, Intravenous, Q24H  famotidine, 20 mg, Intravenous, Daily  heparin (porcine), 5,000 Units, Subcutaneous, Q12H  hydrocortisone sodium succinate, 50 mg, Intravenous, Q8H  insulin glargine, 10 Units, Subcutaneous, Nightly  insulin regular, 4-24 Units, Subcutaneous, 4x Daily AC & at Bedtime  senna-docusate sodium, 2 tablet, Oral, BID  sodium chloride, 10 mL, Intravenous, Q12H  Vancomycin Pharmacy Intermittent/Pulse Dosing, , Does not apply, Daily      IV Meds:   Pharmacy to dose vancomycin,   sodium chloride, 100 mL/hr, Last Rate: 100 mL/hr (10/14/23 0434)        Results Reviewed:   I have personally reviewed the results  from the time of this admission to 10/14/2023 07:18 EDT     Results from last 7 days   Lab Units 10/14/23  0319 10/13/23  0433 10/12/23  0251   SODIUM mmol/L 137 136 135*   POTASSIUM mmol/L 3.5 3.3* 3.5   CHLORIDE mmol/L 100 99 97*   CO2 mmol/L 23.0 23.0 26.0   BUN mg/dL 21 29* 13   CREATININE mg/dL 2.32* 3.00* 2.51*   CALCIUM mg/dL 8.6 8.3* 8.5*   BILIRUBIN mg/dL 0.4 0.4 1.1   ALK PHOS U/L 431* 424* 528*   ALT (SGPT) U/L 26 28 36   AST (SGOT) U/L 62* 103* 235*   GLUCOSE mg/dL 144* 178* 127*       Estimated Creatinine Clearance: 33.8 mL/min (A) (by C-G formula based on SCr of 2.32 mg/dL (H)).    Results from last 7 days   Lab Units 10/14/23  0319 10/13/23  0433 10/11/23  2156   MAGNESIUM mg/dL 2.1 2.2 1.3*   PHOSPHORUS mg/dL 3.2 5.4*  --        Results from last 7 days   Lab Units 10/13/23  0433   URIC ACID mg/dL 8.6*       Results from last 7 days   Lab Units 10/14/23  0319 10/13/23  0433 10/12/23  0251 10/11/23  2156   WBC 10*3/mm3 18.52* 21.53* 22.31* 18.69*   HEMOGLOBIN g/dL 7.4* 7.6* 8.7* 8.3*   PLATELETS 10*3/mm3 284 295 334 379             Assessment / Plan     ASSESSMENT:  Acute kidney injury HD dep since early September 2023 and he was transferred to SNF and he was dialyzed daily over the SNF.  The etiology of the BURAK probably ATN associated with sepsis, but also he was on immunotherapy so concern about GN.  It was decided at [facility] not to proceed with kidney biopsy.    Septic shock improved since admission, the concern if he had catheter related sepsis will follow the recommendation of ID.  Metastatic melanoma previously on immunotherapy  Adrenal insufficiency currently on steroid  Hypomagnesemia improved, magnesium up to two-point\  Protein malnutrition    PLAN:  Hemodialysis MWF  Liberalize diet  Nephro shakes  Stop IVF  Surveillance labs      Thank you for involving us in the care of Delvin Cardozo.  Please feel free to call with any questions.    Sally Reynolds,  MD  10/14/23  07:18 EDT    [facility]  [phone]

## 2023-10-14 NOTE — PROGRESS NOTES
"DAILY PROGRESS NOTE  [facility]    Patient Identification:  Name: Delvin Cardozo  Age: 63 y.o.  Sex: male  :  1960  MRN: [id number]         Primary Care Physician: Provider, No Known      Subjective  Patient notes feeling a \"crinkling\" feeling right lower lateral chest wall area.  Overall feeling well.    Objective:  General Appearance:  Comfortable, well-appearing, in no acute distress and not in pain.    Vital signs: (most recent): Blood pressure 170/93, pulse 80, temperature 97.1 °F (36.2 °C), temperature source Oral, resp. rate 16, height 170.2 cm (67\"), weight 81.5 kg (179 lb 9.6 oz), SpO2 99%.    Lungs:  Normal effort and normal respiratory rate.  He is not in respiratory distress.  No stridor.  No rales, decreased breath sounds, wheezes or rhonchi.  (Pleuritic rub right base.)  Heart: Normal rate.  Regular rhythm.    Chest: Symmetric chest wall expansion. No chest wall tenderness.    Extremities: There is no dependent edema.    Neurological: Patient is alert and oriented to person, place and time.    Skin:  Warm and dry.                  Vital signs in last 24 hours:  Temp:  [97.3 °F (36.3 °C)-98.2 °F (36.8 °C)] 98.2 °F (36.8 °C)  Heart Rate:  [71-86] 75  Resp:  [16-24] 16  BP: (126-176)/() 176/90    Intake/Output:    Intake/Output Summary (Last 24 hours) at 10/14/2023 1235  Last data filed at 10/13/2023 1900  Gross per 24 hour   Intake 100 ml   Output 2000 ml   Net -1900 ml         Results from last 7 days   Lab Units 10/14/23  0319 10/13/23  0433 10/12/23  0251 10/11/23  2156   WBC 10*3/mm3 18.52* 21.53* 22.31* 18.69*   HEMOGLOBIN g/dL 7.4* 7.6* 8.7* 8.3*   PLATELETS 10*3/mm3 284 295 334 379     Results from last 7 days   Lab Units 10/14/23  0319 10/13/23  0433 10/12/23  0251 10/11/23  2156   SODIUM mmol/L 137 136 135* 135*   POTASSIUM mmol/L 3.5 3.3* 3.5 3.1*   CHLORIDE mmol/L 100 99 97* 95*   CO2 mmol/L 23.0 23.0 26.0 27.5   BUN mg/dL 21 29* 13 10   CREATININE mg/dL 2.32* " 3.00* 2.51* 2.30*   GLUCOSE mg/dL 144* 178* 127* 94   Estimated Creatinine Clearance: 33.8 mL/min (A) (by C-G formula based on SCr of 2.32 mg/dL (H)).  Results from last 7 days   Lab Units 10/14/23  0319 10/13/23  0433 10/12/23  0251 10/11/23  2156   CALCIUM mg/dL 8.6 8.3* 8.5* 8.6   ALBUMIN g/dL 2.7* 2.3* 2.7* 2.8*   MAGNESIUM mg/dL 2.1 2.2  --  1.3*   PHOSPHORUS mg/dL 3.2 5.4*  --   --      Results from last 7 days   Lab Units 10/14/23  0319 10/13/23  0433 10/12/23  0251 10/11/23  2156   ALBUMIN g/dL 2.7* 2.3* 2.7* 2.8*   BILIRUBIN mg/dL 0.4 0.4 1.1 0.9   ALK PHOS U/L 431* 424* 528* 547*   AST (SGOT) U/L 62* 103* 235* 146*   ALT (SGPT) U/L 26 28 36 25       Assessment:  Hypotension/shock: Septic shock is the primary working diagnosis..  Presently on broad-spectrum antibiotics.  Infect disease input appreciated.  Cultures negative to date.  Reported adrenal insufficiency: Cortisol levels are high across the board on the cosyntropin stimulation study.  Obviously this is not consistent with adrenal insufficiency.  Also inconsistent for an individual on high-dose steroids unless our assays interacting with the Solu-Medrol.  Called and spoke to the lab.  They are checking into this.  TSH normal.  Patient is not hypotensive we will switch over to a lower dose steroids orally.  Hyperglycemia-reported diabetes type 2: Continue sliding scale insulin.  Add low-dose Lantus.  Check A1c in a.m.  BURAK/CKD: Nephrology input appreciated.  Dialysis per nephrology.  Edema: Likely multifactorial including fluid overload from resuscitation, renal failure, hypoalbuminemia.  Much as this will be handled via dialysis.  Echocardiogram with good ejection fraction.  Grade 1 diastolic dysfunction.  Defer IV fluid management to nephrology.  Metastatic malignant melanoma: Followed by [facility].  Pleurisy right base: Check CT scan of the chest.  We will carry this through the abdomen pelvis noting his history of metastatic melanoma as  well as the sepsis of unknown origin.  Avoid IV dye noting BURKA/CKD.  Anemia: Likely multifactorial.  Stable.  Check iron panel, B12, folate, reticulocyte count....  Deficit of information: Still no information from [facility].  The test results are not showing up in epic.  No records faxed yet.  Nursing staff will put out another call.  Patient is to see what information he can get from my chart records.        Plan:  Please see above.  Discussed with patient.  Discussed with RN.    Jeffrey Potrer MD  10/14/2023  12:35 EDT

## 2023-10-15 ENCOUNTER — APPOINTMENT (OUTPATIENT)
Dept: CT IMAGING | Facility: HOSPITAL | Age: 63
End: 2023-10-15
Payer: COMMERCIAL

## 2023-10-15 LAB
ALBUMIN SERPL-MCNC: 2.7 G/DL (ref 3.5–5.2)
ALP SERPL-CCNC: 503 U/L (ref 39–117)
ALT SERPL W P-5'-P-CCNC: 29 U/L (ref 1–41)
AMMONIA BLD-SCNC: 31 UMOL/L (ref 16–60)
ANION GAP SERPL CALCULATED.3IONS-SCNC: 12 MMOL/L (ref 5–15)
AST SERPL-CCNC: 55 U/L (ref 1–40)
BILIRUB CONJ SERPL-MCNC: 0.3 MG/DL (ref 0–0.3)
BILIRUB INDIRECT SERPL-MCNC: 0.1 MG/DL
BILIRUB SERPL-MCNC: 0.4 MG/DL (ref 0–1.2)
BUN SERPL-MCNC: 29 MG/DL (ref 8–23)
BUN/CREAT SERPL: 11.8 (ref 7–25)
CALCIUM SPEC-SCNC: 8.7 MG/DL (ref 8.6–10.5)
CHLORIDE SERPL-SCNC: 103 MMOL/L (ref 98–107)
CO2 SERPL-SCNC: 21 MMOL/L (ref 22–29)
CREAT SERPL-MCNC: 2.46 MG/DL (ref 0.76–1.27)
DEPRECATED RDW RBC AUTO: 46 FL (ref 37–54)
EGFRCR SERPLBLD CKD-EPI 2021: 28.9 ML/MIN/1.73
ERYTHROCYTE [DISTWIDTH] IN BLOOD BY AUTOMATED COUNT: 14.9 % (ref 12.3–15.4)
FOLATE SERPL-MCNC: 2.71 NG/ML (ref 4.78–24.2)
GLUCOSE BLDC GLUCOMTR-MCNC: 114 MG/DL (ref 70–130)
GLUCOSE BLDC GLUCOMTR-MCNC: 116 MG/DL (ref 70–130)
GLUCOSE BLDC GLUCOMTR-MCNC: 120 MG/DL (ref 70–130)
GLUCOSE BLDC GLUCOMTR-MCNC: 82 MG/DL (ref 70–130)
GLUCOSE SERPL-MCNC: 146 MG/DL (ref 65–99)
HCT VFR BLD AUTO: 23.4 % (ref 37.5–51)
HGB BLD-MCNC: 7.4 G/DL (ref 13–17.7)
INR PPP: 1.02 (ref 0.9–1.1)
IRON 24H UR-MRATE: 34 MCG/DL (ref 59–158)
IRON SATN MFR SERPL: 18 % (ref 20–50)
MCH RBC QN AUTO: 26.9 PG (ref 26.6–33)
MCHC RBC AUTO-ENTMCNC: 31.6 G/DL (ref 31.5–35.7)
MCV RBC AUTO: 85.1 FL (ref 79–97)
PHOSPHATE SERPL-MCNC: 3.2 MG/DL (ref 2.5–4.5)
PLATELET # BLD AUTO: 271 10*3/MM3 (ref 140–450)
PMV BLD AUTO: 9.5 FL (ref 6–12)
POTASSIUM SERPL-SCNC: 3.5 MMOL/L (ref 3.5–5.2)
PROT SERPL-MCNC: 5.5 G/DL (ref 6–8.5)
PROTHROMBIN TIME: 13.6 SECONDS (ref 11.7–14.2)
RBC # BLD AUTO: 2.75 10*6/MM3 (ref 4.14–5.8)
RETICS # AUTO: 0.08 10*6/MM3 (ref 0.02–0.13)
RETICS/RBC NFR AUTO: 3 % (ref 0.7–1.9)
SODIUM SERPL-SCNC: 136 MMOL/L (ref 136–145)
TIBC SERPL-MCNC: 191 MCG/DL (ref 298–536)
TRANSFERRIN SERPL-MCNC: 128 MG/DL (ref 200–360)
URATE SERPL-MCNC: 7 MG/DL (ref 3.4–7)
VIT B12 BLD-MCNC: 991 PG/ML (ref 211–946)
WBC NRBC COR # BLD: 15.68 10*3/MM3 (ref 3.4–10.8)

## 2023-10-15 PROCEDURE — 80076 HEPATIC FUNCTION PANEL: CPT | Performed by: HOSPITALIST

## 2023-10-15 PROCEDURE — 82140 ASSAY OF AMMONIA: CPT | Performed by: INTERNAL MEDICINE

## 2023-10-15 PROCEDURE — 74176 CT ABD & PELVIS W/O CONTRAST: CPT

## 2023-10-15 PROCEDURE — 82607 VITAMIN B-12: CPT | Performed by: HOSPITALIST

## 2023-10-15 PROCEDURE — 82746 ASSAY OF FOLIC ACID SERUM: CPT | Performed by: HOSPITALIST

## 2023-10-15 PROCEDURE — 63710000001 PREDNISONE PER 5 MG: Performed by: HOSPITALIST

## 2023-10-15 PROCEDURE — 63710000001 PREDNISONE PER 1 MG: Performed by: HOSPITALIST

## 2023-10-15 PROCEDURE — 71250 CT THORAX DX C-: CPT

## 2023-10-15 PROCEDURE — 0 DIATRIZOATE MEGLUMINE & SODIUM PER 1 ML: Performed by: HOSPITALIST

## 2023-10-15 PROCEDURE — 63710000001 INSULIN GLARGINE PER 5 UNITS: Performed by: HOSPITALIST

## 2023-10-15 PROCEDURE — 85610 PROTHROMBIN TIME: CPT | Performed by: HOSPITALIST

## 2023-10-15 PROCEDURE — 82948 REAGENT STRIP/BLOOD GLUCOSE: CPT

## 2023-10-15 PROCEDURE — 85045 AUTOMATED RETICULOCYTE COUNT: CPT | Performed by: HOSPITALIST

## 2023-10-15 PROCEDURE — 84550 ASSAY OF BLOOD/URIC ACID: CPT | Performed by: INTERNAL MEDICINE

## 2023-10-15 PROCEDURE — 80048 BASIC METABOLIC PNL TOTAL CA: CPT | Performed by: INTERNAL MEDICINE

## 2023-10-15 PROCEDURE — 83540 ASSAY OF IRON: CPT | Performed by: HOSPITALIST

## 2023-10-15 PROCEDURE — 85027 COMPLETE CBC AUTOMATED: CPT | Performed by: INTERNAL MEDICINE

## 2023-10-15 PROCEDURE — 84100 ASSAY OF PHOSPHORUS: CPT | Performed by: INTERNAL MEDICINE

## 2023-10-15 PROCEDURE — 84466 ASSAY OF TRANSFERRIN: CPT | Performed by: HOSPITALIST

## 2023-10-15 PROCEDURE — 25010000002 HEPARIN (PORCINE) PER 1000 UNITS: Performed by: INTERNAL MEDICINE

## 2023-10-15 RX ORDER — CHOLECALCIFEROL (VITAMIN D3) 125 MCG
5 CAPSULE ORAL NIGHTLY PRN
COMMUNITY
Start: 2023-10-08

## 2023-10-15 RX ORDER — CLONAZEPAM 0.5 MG/1
0.5 TABLET ORAL 2 TIMES DAILY PRN
Status: ON HOLD | COMMUNITY
End: 2023-10-19 | Stop reason: SDUPTHER

## 2023-10-15 RX ORDER — PANTOPRAZOLE SODIUM 40 MG/1
40 TABLET, DELAYED RELEASE ORAL DAILY
COMMUNITY
Start: 2023-10-08

## 2023-10-15 RX ORDER — ASPIRIN 81 MG/1
81 TABLET, CHEWABLE ORAL DAILY
COMMUNITY
Start: 2023-10-08

## 2023-10-15 RX ORDER — PREDNISONE 10 MG/1
10 TABLET ORAL 2 TIMES DAILY WITH MEALS
Status: DISCONTINUED | OUTPATIENT
Start: 2023-10-15 | End: 2023-10-20 | Stop reason: HOSPADM

## 2023-10-15 RX ORDER — VENLAFAXINE 75 MG/1
75 TABLET ORAL DAILY
COMMUNITY
Start: 2023-10-08

## 2023-10-15 RX ORDER — PREDNISONE 10 MG/1
10 TABLET ORAL DAILY
COMMUNITY
Start: 2023-10-08

## 2023-10-15 RX ORDER — FOLIC ACID 1 MG/1
1 TABLET ORAL DAILY
Status: DISCONTINUED | OUTPATIENT
Start: 2023-10-16 | End: 2023-10-20 | Stop reason: HOSPADM

## 2023-10-15 RX ORDER — LOPERAMIDE HYDROCHLORIDE 2 MG/1
2 CAPSULE ORAL 4 TIMES DAILY PRN
Status: DISCONTINUED | OUTPATIENT
Start: 2023-10-15 | End: 2023-10-20 | Stop reason: HOSPADM

## 2023-10-15 RX ORDER — CLONAZEPAM 0.5 MG/1
0.5 TABLET ORAL 2 TIMES DAILY PRN
Status: DISCONTINUED | OUTPATIENT
Start: 2023-10-15 | End: 2023-10-20 | Stop reason: HOSPADM

## 2023-10-15 RX ORDER — OMEGA-3-ACID ETHYL ESTERS 1 G/1
1 CAPSULE, LIQUID FILLED ORAL 2 TIMES DAILY
COMMUNITY
Start: 2023-08-16

## 2023-10-15 RX ORDER — DEXTROAMPHETAMINE SACCHARATE, AMPHETAMINE ASPARTATE, DEXTROAMPHETAMINE SULFATE AND AMPHETAMINE SULFATE 1.25; 1.25; 1.25; 1.25 MG/1; MG/1; MG/1; MG/1
5 TABLET ORAL 3 TIMES DAILY
Status: DISCONTINUED | OUTPATIENT
Start: 2023-10-15 | End: 2023-10-20 | Stop reason: HOSPADM

## 2023-10-15 RX ORDER — VENLAFAXINE 75 MG/1
75 TABLET ORAL DAILY
Status: DISCONTINUED | OUTPATIENT
Start: 2023-10-15 | End: 2023-10-20 | Stop reason: HOSPADM

## 2023-10-15 RX ORDER — ATORVASTATIN CALCIUM 20 MG/1
20 TABLET, FILM COATED ORAL DAILY
COMMUNITY
Start: 2023-10-08

## 2023-10-15 RX ORDER — TORSEMIDE 20 MG/1
20 TABLET ORAL DAILY
COMMUNITY
Start: 2023-10-08 | End: 2023-10-20 | Stop reason: HOSPADM

## 2023-10-15 RX ORDER — DEXTROAMPHETAMINE SACCHARATE, AMPHETAMINE ASPARTATE, DEXTROAMPHETAMINE SULFATE AND AMPHETAMINE SULFATE 1.25; 1.25; 1.25; 1.25 MG/1; MG/1; MG/1; MG/1
5 TABLET ORAL 3 TIMES DAILY
Status: ON HOLD | COMMUNITY
End: 2023-10-19 | Stop reason: SDUPTHER

## 2023-10-15 RX ADMIN — CLONAZEPAM 0.5 MG: 0.5 TABLET ORAL at 22:04

## 2023-10-15 RX ADMIN — Medication 10 ML: at 08:27

## 2023-10-15 RX ADMIN — FOLIC ACID 1 MG: 5 INJECTION, SOLUTION INTRAMUSCULAR; INTRAVENOUS; SUBCUTANEOUS at 13:54

## 2023-10-15 RX ADMIN — HEPARIN SODIUM 5000 UNITS: 5000 INJECTION INTRAVENOUS; SUBCUTANEOUS at 08:27

## 2023-10-15 RX ADMIN — INSULIN GLARGINE 10 UNITS: 100 INJECTION, SOLUTION SUBCUTANEOUS at 22:01

## 2023-10-15 RX ADMIN — PANTOPRAZOLE SODIUM 40 MG: 40 TABLET, DELAYED RELEASE ORAL at 17:41

## 2023-10-15 RX ADMIN — Medication 3 MG: at 22:03

## 2023-10-15 RX ADMIN — DIATRIZOATE MEGLUMINE AND DIATRIZOATE SODIUM 30 ML: 660; 100 LIQUID ORAL; RECTAL at 10:26

## 2023-10-15 RX ADMIN — PANTOPRAZOLE SODIUM 40 MG: 40 TABLET, DELAYED RELEASE ORAL at 06:16

## 2023-10-15 RX ADMIN — VENLAFAXINE HYDROCHLORIDE 75 MG: 75 TABLET ORAL at 14:35

## 2023-10-15 RX ADMIN — DEXTROAMPHETAMINE SACCHARATE, AMPHETAMINE ASPARTATE, DEXTROAMPHETAMINE SULFATE, AMPHETAMINE SULFATE TABLETS, 5 MG,CLL 5 MG: 1.25; 1.25; 1.25; 1.25 TABLET ORAL at 17:41

## 2023-10-15 RX ADMIN — PREDNISONE 20 MG: 20 TABLET ORAL at 08:27

## 2023-10-15 RX ADMIN — Medication 10 ML: at 22:10

## 2023-10-15 RX ADMIN — LOPERAMIDE HYDROCHLORIDE 2 MG: 2 CAPSULE ORAL at 18:19

## 2023-10-15 RX ADMIN — PREDNISONE 10 MG: 10 TABLET ORAL at 17:41

## 2023-10-15 RX ADMIN — HEPARIN SODIUM 5000 UNITS: 5000 INJECTION INTRAVENOUS; SUBCUTANEOUS at 22:04

## 2023-10-15 NOTE — PROGRESS NOTES
"DAILY PROGRESS NOTE  [facility]    Patient Identification:  Name: Delvin Cardozo  Age: 63 y.o.  Sex: male  :  1960  MRN: [id number]         Primary Care Physician: Provider, No Known      Subjective  Patient presently with no new or acute complaints.  We were able to receive records from [facility] rec reviewed.  Per records he presented essentially in shock covered in stool.  Report is that his apartment was covered in dark and bloody stool.  Admitted to ICU.  History of C. difficile colitis but apparently none found during that admission.  This included LP.  HIV negative.  He did have osteomyelitis of the left second toe which was known prior to admission and he did undergo amputation during the hospitalization.  No evidence of septicemia however.      Discharge diagnosis included:  Undifferentiated septic shock  Acute hypoxic respiratory failure  Acute encephalopathy  CKD 3 3/BURAK associated with ATN  Stage IIIc melanoma  Osteomyelitis left second toe  Non-ST elevated MI with nonobstructive coronary disease    No source of GI blood loss was found.    No mention of adrenal insufficiency in the records, only noted that he had been on daily steroids prior to admission.    He also has a history of hypertension, ADHD, PTSD, anxiety, diabetes type 2    Objective:  General Appearance:  Comfortable, well-appearing, in no acute distress and not in pain.    Vital signs: (most recent): Blood pressure 165/95, pulse 91, temperature 97.8 °F (36.6 °C), temperature source Oral, resp. rate 16, height 170.2 cm (67\"), weight 81.5 kg (179 lb 9.6 oz), SpO2 99%.    Lungs:  Normal effort and normal respiratory rate.  Breath sounds clear to auscultation.  (Pleuritic rub noted yesterday does not present on today's exam.)  Heart: Normal rate.  Regular rhythm.    Abdomen: Abdomen is soft and non-distended.  There is no abdominal tenderness.     Extremities: There is dependent edema.    Neurological: Patient is alert " and oriented to person, place and time.    Skin:  Warm and dry.                Vital signs in last 24 hours:  Temp:  [97.1 °F (36.2 °C)-98.2 °F (36.8 °C)] 97.8 °F (36.6 °C)  Heart Rate:  [80-91] 91  Resp:  [16-18] 16  BP: (136-170)/(80-95) 165/95    Intake/Output:    Intake/Output Summary (Last 24 hours) at 10/15/2023 1651  Last data filed at 10/15/2023 0800  Gross per 24 hour   Intake 360 ml   Output 1025 ml   Net -665 ml         Results from last 7 days   Lab Units 10/15/23  0312 10/14/23  0319 10/13/23  0433 10/12/23  0251 10/11/23  2156   WBC 10*3/mm3 15.68* 18.52* 21.53* 22.31* 18.69*   HEMOGLOBIN g/dL 7.4* 7.4* 7.6* 8.7* 8.3*   PLATELETS 10*3/mm3 271 284 295 334 379     Results from last 7 days   Lab Units 10/15/23  0312 10/14/23  0319 10/13/23  0433 10/12/23  0251 10/11/23  2156   SODIUM mmol/L 136 137 136 135* 135*   POTASSIUM mmol/L 3.5 3.5 3.3* 3.5 3.1*   CHLORIDE mmol/L 103 100 99 97* 95*   CO2 mmol/L 21.0* 23.0 23.0 26.0 27.5   BUN mg/dL 29* 21 29* 13 10   CREATININE mg/dL 2.46* 2.32* 3.00* 2.51* 2.30*   GLUCOSE mg/dL 146* 144* 178* 127* 94   Estimated Creatinine Clearance: 31.8 mL/min (A) (by C-G formula based on SCr of 2.46 mg/dL (H)).  Results from last 7 days   Lab Units 10/15/23  0312 10/14/23  0319 10/13/23  0433 10/12/23  0251 10/11/23  2156   CALCIUM mg/dL 8.7 8.6 8.3* 8.5* 8.6   ALBUMIN g/dL 2.7* 2.7* 2.3* 2.7* 2.8*   MAGNESIUM mg/dL  --  2.1 2.2  --  1.3*   PHOSPHORUS mg/dL 3.2 3.2 5.4*  --   --      Results from last 7 days   Lab Units 10/15/23  0312 10/14/23  0319 10/13/23  0433 10/12/23  0251 10/11/23  2156   ALBUMIN g/dL 2.7* 2.7* 2.3* 2.7* 2.8*   BILIRUBIN mg/dL 0.4 0.4 0.4 1.1 0.9   ALK PHOS U/L 503* 431* 424* 528* 547*   AST (SGOT) U/L 55* 62* 103* 235* 146*   ALT (SGPT) U/L 29 26 28 36 25       Assessment:  Hypotension/shock: Septic shock is the primary working diagnosis..  Cultures all negative.  Antibiotics now on hold.  Infect disease input appreciated.    Chronic steroid use:  Cortisol levels are high across the board on the cosyntropin stimulation study.  Obviously this is not consistent with adrenal insufficiency.  Also inconsistent for an individual on high-dose steroids.  After Solu-Medrol and cosyntropin has had a chance to clear the system we will likely have this repeated.    Diabetes type 2: Continue sliding scale insulin.  Add low-dose Lantus.  Check A1c in a.m.  BURAK/CKD: Nephrology input appreciated.  Dialysis per nephrology.  Edema: Likely multifactorial including fluid overload from resuscitation, renal failure, hypoalbuminemia.  Much as this will be handled via dialysis.  Echocardiogram with good ejection fraction.  Grade 1 diastolic dysfunction.  Defer IV fluid management to nephrology.  Stage IIIc melanoma: Followed by [facility].  Anemia: Labs presently consistent with anemia of chronic disease with a mild degree of iron deficiency and folate deficiency.  Will initiate folate supplements as well as iron.  ADHD/PTSD/anxiety:      Plan:  Please see above.  Discussed with patient and with his permission friends at bedside.  Discussed with RN.  Over 35 minutes spent with over one half in counseling medical management.    Jeffrey Porter MD  10/15/2023  16:51 EDT

## 2023-10-15 NOTE — NURSING NOTE
Pt found in bathroom on floor after bathroom alarm going off- stated he had his cane, cell phone, and monitor in hand - turned around to shut the door and got wrapped up in O2 cord and stated his hands were to full so he fell to the floor on his bottom. No injuries reported. MD and house aware.

## 2023-10-15 NOTE — PLAN OF CARE
Goal Outcome Evaluation:  Plan of Care Reviewed With: patient      Progress: no change  Outcome Evaluation: All needs met. Stand-by assist to toilet. BM tonight. CC diet. Wound care performed to L second toe and L flank. VSS. Oriented x 4, but forgetful at times. RA. SR on the monitor. No complaints. SQ Heparin. Melatonin given PRN. Blood glucose monitoring.

## 2023-10-15 NOTE — PROGRESS NOTES
[facility] Progress Note      Patient Name: Delvin Cardozo  : 1960  MRN: [id number]  Primary Care Physician:  Provider, No Known  Date of admission: 10/11/2023    Subjective     Interval History:   Follow-up acute kidney injury  No new events  Not eating that well due to lack of hunger.  Review of Systems:   As noted above    Objective     Vitals:   Temp:  [97.9 °F (36.6 °C)-98.4 °F (36.9 °C)] 98.2 °F (36.8 °C)  Heart Rate:  [81-85] 85  Resp:  [16-18] 18  BP: (136-176)/(80-96) 136/80    Intake/Output Summary (Last 24 hours) at 10/15/2023 0712  Last data filed at 10/15/2023 0616  Gross per 24 hour   Intake 360 ml   Output 925 ml   Net -565 ml       Physical Exam:    General Appearance: alert, awake, chronically ill, no acute distress.  Pale.  Hard of hearing.  Skin: warm and dry  HEENT: oral mucosa normal, nonicteric sclera  Neck: No JVD, he has tunneled dialysis catheter in the right IJ with exit site below the right clavicle.  Lungs: Bilateral rhonchi, breathing effort not labored  Heart: RRR, normal S1 and S2, no S3, no rub  Abdomen: soft, nontender, nondistended, normoactive bowel  : no palpable bladder  Extremities: 2+ lower extremity edema  Neuro: normal speech and mental status     Scheduled Meds:     heparin (porcine), 5,000 Units, Subcutaneous, Q12H  insulin glargine, 10 Units, Subcutaneous, Nightly  insulin regular, 4-24 Units, Subcutaneous, 4x Daily AC & at Bedtime  pantoprazole, 40 mg, Oral, BID AC  predniSONE, 20 mg, Oral, BID With Meals  senna-docusate sodium, 2 tablet, Oral, BID  sodium chloride, 10 mL, Intravenous, Q12H      IV Meds:          Results Reviewed:   I have personally reviewed the results from the time of this admission to 10/15/2023 07:12 EDT     Results from last 7 days   Lab Units 10/15/23  0312 10/14/23  0319 10/13/23  0433   SODIUM mmol/L 136 137 136   POTASSIUM mmol/L 3.5 3.5 3.3*   CHLORIDE mmol/L 103 100 99   CO2 mmol/L 21.0* 23.0 23.0   BUN  mg/dL 29* 21 29*   CREATININE mg/dL 2.46* 2.32* 3.00*   CALCIUM mg/dL 8.7 8.6 8.3*   BILIRUBIN mg/dL 0.4 0.4 0.4   ALK PHOS U/L 503* 431* 424*   ALT (SGPT) U/L 29 26 28   AST (SGOT) U/L 55* 62* 103*   GLUCOSE mg/dL 146* 144* 178*       Estimated Creatinine Clearance: 31.8 mL/min (A) (by C-G formula based on SCr of 2.46 mg/dL (H)).    Results from last 7 days   Lab Units 10/15/23  0312 10/14/23  0319 10/13/23  0433 10/11/23  2156   MAGNESIUM mg/dL  --  2.1 2.2 1.3*   PHOSPHORUS mg/dL 3.2 3.2 5.4*  --        Results from last 7 days   Lab Units 10/15/23  0312 10/13/23  0433   URIC ACID mg/dL 7.0 8.6*       Results from last 7 days   Lab Units 10/15/23  0312 10/14/23  0319 10/13/23  0433 10/12/23  0251 10/11/23  2156   WBC 10*3/mm3 15.68* 18.52* 21.53* 22.31* 18.69*   HEMOGLOBIN g/dL 7.4* 7.4* 7.6* 8.7* 8.3*   PLATELETS 10*3/mm3 271 284 295 334 379       Results from last 7 days   Lab Units 10/15/23  0312   INR  1.02       Assessment / Plan     ASSESSMENT:  Acute kidney injury HD dep since early September 2023 and he was transferred to SNF and he was dialyzed daily over the SNF.  The etiology of the BURAK probably ATN associated with sepsis related to osteo of left toe.   Septic shock improved since admission, the concern if he had catheter related sepsis will follow the recommendation of ID.  Metastatic melanoma to liver and lung stage 3c previously on immunotherapy.  Followed at [facility].  Adrenal insufficiency currently on steroid.  Seems to be fine off IVF, bp is elevated.  Behavior disorder:  anxious and tangential.  Protein malnutrition  History of NSTEMI  IDDM    PLAN:  Hemodialysis MWF via [facility]  Liberalize diet  Nephro shakes  Surveillance labs  Transfer back to [facility]?    Thank you for involving us in the care of Delvin Cardozo.  Please feel free to call with any questions.    Sally Reynolds MD  10/15/23  07:12 EDT    [facility]  [phone]

## 2023-10-16 LAB
ALBUMIN SERPL-MCNC: 2.7 G/DL (ref 3.5–5.2)
ALP SERPL-CCNC: 499 U/L (ref 39–117)
ALT SERPL W P-5'-P-CCNC: 30 U/L (ref 1–41)
AMMONIA BLD-SCNC: 39 UMOL/L (ref 16–60)
ANION GAP SERPL CALCULATED.3IONS-SCNC: 13 MMOL/L (ref 5–15)
AST SERPL-CCNC: 44 U/L (ref 1–40)
BACTERIA SPEC AEROBE CULT: NORMAL
BACTERIA SPEC AEROBE CULT: NORMAL
BILIRUB CONJ SERPL-MCNC: 0.3 MG/DL (ref 0–0.3)
BILIRUB INDIRECT SERPL-MCNC: 0.1 MG/DL
BILIRUB SERPL-MCNC: 0.4 MG/DL (ref 0–1.2)
BUN SERPL-MCNC: 31 MG/DL (ref 8–23)
BUN/CREAT SERPL: 13.4 (ref 7–25)
CALCIUM SPEC-SCNC: 8.8 MG/DL (ref 8.6–10.5)
CHLORIDE SERPL-SCNC: 102 MMOL/L (ref 98–107)
CO2 SERPL-SCNC: 22 MMOL/L (ref 22–29)
CORTIS SERPL-MCNC: 2.72 MCG/DL
CORTIS SERPL-MCNC: 2.91 MCG/DL
CORTIS SERPL-MCNC: 3.93 MCG/DL
CORTIS SERPL-MCNC: 6.27 MCG/DL
CORTIS SERPL-MCNC: 6.59 MCG/DL
CREAT SERPL-MCNC: 2.31 MG/DL (ref 0.76–1.27)
DEPRECATED RDW RBC AUTO: 47 FL (ref 37–54)
EGFRCR SERPLBLD CKD-EPI 2021: 31.2 ML/MIN/1.73
ERYTHROCYTE [DISTWIDTH] IN BLOOD BY AUTOMATED COUNT: 15.1 % (ref 12.3–15.4)
GLUCOSE BLDC GLUCOMTR-MCNC: 126 MG/DL (ref 70–130)
GLUCOSE BLDC GLUCOMTR-MCNC: 211 MG/DL (ref 70–130)
GLUCOSE BLDC GLUCOMTR-MCNC: 77 MG/DL (ref 70–130)
GLUCOSE BLDC GLUCOMTR-MCNC: 97 MG/DL (ref 70–130)
GLUCOSE SERPL-MCNC: 141 MG/DL (ref 65–99)
HCT VFR BLD AUTO: 25.4 % (ref 37.5–51)
HGB BLD-MCNC: 8 G/DL (ref 13–17.7)
MCH RBC QN AUTO: 26.8 PG (ref 26.6–33)
MCHC RBC AUTO-ENTMCNC: 31.5 G/DL (ref 31.5–35.7)
MCV RBC AUTO: 85.2 FL (ref 79–97)
PHOSPHATE SERPL-MCNC: 3.2 MG/DL (ref 2.5–4.5)
PLATELET # BLD AUTO: 273 10*3/MM3 (ref 140–450)
PMV BLD AUTO: 9.9 FL (ref 6–12)
POTASSIUM SERPL-SCNC: 3.2 MMOL/L (ref 3.5–5.2)
PROT SERPL-MCNC: 5.5 G/DL (ref 6–8.5)
RBC # BLD AUTO: 2.98 10*6/MM3 (ref 4.14–5.8)
SODIUM SERPL-SCNC: 137 MMOL/L (ref 136–145)
WBC NRBC COR # BLD: 13.95 10*3/MM3 (ref 3.4–10.8)

## 2023-10-16 PROCEDURE — 85027 COMPLETE CBC AUTOMATED: CPT | Performed by: INTERNAL MEDICINE

## 2023-10-16 PROCEDURE — 63710000001 INSULIN REGULAR HUMAN PER 5 UNITS: Performed by: HOSPITALIST

## 2023-10-16 PROCEDURE — 82948 REAGENT STRIP/BLOOD GLUCOSE: CPT

## 2023-10-16 PROCEDURE — 84100 ASSAY OF PHOSPHORUS: CPT | Performed by: INTERNAL MEDICINE

## 2023-10-16 PROCEDURE — 63710000001 PREDNISONE PER 5 MG: Performed by: HOSPITALIST

## 2023-10-16 PROCEDURE — 80048 BASIC METABOLIC PNL TOTAL CA: CPT | Performed by: INTERNAL MEDICINE

## 2023-10-16 PROCEDURE — 97162 PT EVAL MOD COMPLEX 30 MIN: CPT

## 2023-10-16 PROCEDURE — 82140 ASSAY OF AMMONIA: CPT | Performed by: INTERNAL MEDICINE

## 2023-10-16 PROCEDURE — 80076 HEPATIC FUNCTION PANEL: CPT | Performed by: HOSPITALIST

## 2023-10-16 PROCEDURE — 97530 THERAPEUTIC ACTIVITIES: CPT

## 2023-10-16 PROCEDURE — 99232 SBSQ HOSP IP/OBS MODERATE 35: CPT | Performed by: INTERNAL MEDICINE

## 2023-10-16 PROCEDURE — 63710000001 INSULIN GLARGINE PER 5 UNITS: Performed by: HOSPITALIST

## 2023-10-16 PROCEDURE — 25010000002 COSYNTROPIN PER 0.25 MG: Performed by: HOSPITALIST

## 2023-10-16 PROCEDURE — 25010000002 HEPARIN (PORCINE) PER 1000 UNITS: Performed by: HOSPITALIST

## 2023-10-16 PROCEDURE — 25010000002 HEPARIN (PORCINE) PER 1000 UNITS: Performed by: INTERNAL MEDICINE

## 2023-10-16 PROCEDURE — 82533 TOTAL CORTISOL: CPT | Performed by: HOSPITALIST

## 2023-10-16 RX ORDER — COSYNTROPIN 0.25 MG/ML
0.25 INJECTION, POWDER, FOR SOLUTION INTRAMUSCULAR; INTRAVENOUS ONCE
Status: COMPLETED | OUTPATIENT
Start: 2023-10-16 | End: 2023-10-16

## 2023-10-16 RX ORDER — POTASSIUM CHLORIDE 750 MG/1
40 TABLET, FILM COATED, EXTENDED RELEASE ORAL ONCE
Status: COMPLETED | OUTPATIENT
Start: 2023-10-16 | End: 2023-10-16

## 2023-10-16 RX ADMIN — PREDNISONE 10 MG: 10 TABLET ORAL at 17:08

## 2023-10-16 RX ADMIN — HEPARIN SODIUM 5000 UNITS: 5000 INJECTION INTRAVENOUS; SUBCUTANEOUS at 21:28

## 2023-10-16 RX ADMIN — LOPERAMIDE HYDROCHLORIDE 2 MG: 2 CAPSULE ORAL at 20:20

## 2023-10-16 RX ADMIN — POTASSIUM CHLORIDE 40 MEQ: 750 TABLET, EXTENDED RELEASE ORAL at 09:03

## 2023-10-16 RX ADMIN — COSYNTROPIN 0.25 MG: 0.25 INJECTION, POWDER, LYOPHILIZED, FOR SOLUTION INTRAVENOUS at 14:27

## 2023-10-16 RX ADMIN — FOLIC ACID 1 MG: 1 TABLET ORAL at 09:03

## 2023-10-16 RX ADMIN — CLONAZEPAM 0.5 MG: 0.5 TABLET ORAL at 11:12

## 2023-10-16 RX ADMIN — Medication 10 ML: at 09:04

## 2023-10-16 RX ADMIN — Medication 10 ML: at 21:30

## 2023-10-16 RX ADMIN — INSULIN GLARGINE 10 UNITS: 100 INJECTION, SOLUTION SUBCUTANEOUS at 21:26

## 2023-10-16 RX ADMIN — PREDNISONE 10 MG: 10 TABLET ORAL at 09:03

## 2023-10-16 RX ADMIN — INSULIN HUMAN 8 UNITS: 100 INJECTION, SOLUTION PARENTERAL at 21:26

## 2023-10-16 RX ADMIN — PANTOPRAZOLE SODIUM 40 MG: 40 TABLET, DELAYED RELEASE ORAL at 17:08

## 2023-10-16 RX ADMIN — VENLAFAXINE HYDROCHLORIDE 75 MG: 75 TABLET ORAL at 09:03

## 2023-10-16 RX ADMIN — DEXTROAMPHETAMINE SACCHARATE, AMPHETAMINE ASPARTATE, DEXTROAMPHETAMINE SULFATE, AMPHETAMINE SULFATE TABLETS, 5 MG,CLL 5 MG: 1.25; 1.25; 1.25; 1.25 TABLET ORAL at 17:08

## 2023-10-16 RX ADMIN — DEXTROAMPHETAMINE SACCHARATE, AMPHETAMINE ASPARTATE, DEXTROAMPHETAMINE SULFATE, AMPHETAMINE SULFATE TABLETS, 5 MG,CLL 5 MG: 1.25; 1.25; 1.25; 1.25 TABLET ORAL at 09:03

## 2023-10-16 RX ADMIN — LOPERAMIDE HYDROCHLORIDE 2 MG: 2 CAPSULE ORAL at 11:12

## 2023-10-16 RX ADMIN — HEPARIN SODIUM 5000 UNITS: 5000 INJECTION INTRAVENOUS; SUBCUTANEOUS at 09:02

## 2023-10-16 RX ADMIN — PANTOPRAZOLE SODIUM 40 MG: 40 TABLET, DELAYED RELEASE ORAL at 06:07

## 2023-10-16 NOTE — CONSULTS
Met with Pt bedside to provide spiritual care. We talked about the Pt and their family. Discussed hopes for the Pt and wish to be comfortable and see their kids. Prayed with Pt. Pt knows that chaplains are available at request, and I will attempt to check in tomorrow if Pt is still in Hospital.

## 2023-10-16 NOTE — PROGRESS NOTES
"DAILY PROGRESS NOTE  [facility]    Patient Identification:  Name: Delvin Cardozo  Age: 63 y.o.  Sex: male  :  1960  MRN: [id number]         Primary Care Physician: Provider, No Known      Subjective  Patient with no new or acute complaints.  Overall feeling much better and stronger.    Objective:  General Appearance:  Comfortable, well-appearing, in no acute distress and not in pain.    Vital signs: (most recent): Blood pressure 162/99, pulse 92, temperature 98.6 °F (37 °C), temperature source Oral, resp. rate 18, height 170.2 cm (67\"), weight 83.5 kg (184 lb 1.4 oz), SpO2 99%.    Lungs:  Normal effort and normal respiratory rate.  Breath sounds clear to auscultation.    Heart: Normal rate.  Regular rhythm.    Extremities: There is no dependent edema.    Neurological: Patient is alert and oriented to person, place and time.    Skin:  Warm and dry.                  Vital signs in last 24 hours:  Temp:  [97.7 °F (36.5 °C)-98.6 °F (37 °C)] 98.6 °F (37 °C)  Heart Rate:  [87-97] 92  Resp:  [18] 18  BP: (149-179)/() 162/99    Intake/Output:    Intake/Output Summary (Last 24 hours) at 10/16/2023 1227  Last data filed at 10/16/2023 0855  Gross per 24 hour   Intake 250 ml   Output 500 ml   Net -250 ml         Results from last 7 days   Lab Units 10/16/23  0330 10/15/23  0312 10/14/23  0319 10/13/23  0433 10/12/23  0251 10/11/23  2156   WBC 10*3/mm3 13.95* 15.68* 18.52* 21.53* 22.31* 18.69*   HEMOGLOBIN g/dL 8.0* 7.4* 7.4* 7.6* 8.7* 8.3*   PLATELETS 10*3/mm3 273 271 284 295 334 379     Results from last 7 days   Lab Units 10/16/23  0330 10/15/23  0312 10/14/23  0319 10/13/23  0433 10/12/23  0251 10/11/23  2156   SODIUM mmol/L 137 136 137 136 135* 135*   POTASSIUM mmol/L 3.2* 3.5 3.5 3.3* 3.5 3.1*   CHLORIDE mmol/L 102 103 100 99 97* 95*   CO2 mmol/L 22.0 21.0* 23.0 23.0 26.0 27.5   BUN mg/dL 31* 29* 21 29* 13 10   CREATININE mg/dL 2.31* 2.46* 2.32* 3.00* 2.51* 2.30*   GLUCOSE mg/dL 141* 146* 144* " 178* 127* 94   Estimated Creatinine Clearance: 34.3 mL/min (A) (by C-G formula based on SCr of 2.31 mg/dL (H)).  Results from last 7 days   Lab Units 10/16/23  0330 10/15/23  0312 10/14/23  0319 10/13/23  0433 10/12/23  0251 10/11/23  2156   CALCIUM mg/dL 8.8 8.7 8.6 8.3* 8.5* 8.6   ALBUMIN g/dL 2.7* 2.7* 2.7* 2.3* 2.7* 2.8*   MAGNESIUM mg/dL  --   --  2.1 2.2  --  1.3*   PHOSPHORUS mg/dL 3.2 3.2 3.2 5.4*  --   --      Results from last 7 days   Lab Units 10/16/23  0330 10/15/23  0312 10/14/23  0319 10/13/23  0433 10/12/23  0251 10/11/23  2156   ALBUMIN g/dL 2.7* 2.7* 2.7* 2.3* 2.7* 2.8*   BILIRUBIN mg/dL 0.4 0.4 0.4 0.4 1.1 0.9   ALK PHOS U/L 499* 503* 431* 424* 528* 547*   AST (SGOT) U/L 44* 55* 62* 103* 235* 146*   ALT (SGPT) U/L 30 29 26 28 36 25       Assessment:  Hypotension/shock: Septic shock is the primary working diagnosis..  Cultures all negative.  Antibiotics now on hold and patient doing well off antibiotics.  Infect disease input appreciated.    Chronic steroid use: Cortisol levels are high across the board on the cosyntropin stimulation study.  Obviously this is not consistent with adrenal insufficiency.  Also inconsistent for an individual on high-dose steroids.  After Solu-Medrol and cosyntropin has had a chance to clear the system we will likely have this repeated.    Diabetes type 2: Continue sliding scale insulin.  Very good control last 24 hours.  May need to wean down insulin as steroids are weaned down..  A1c in reasonable range at 6.9.  BURAK/CKD: Nephrology input appreciated.  Dialysis per nephrology.  Edema: Likely multifactorial including fluid overload from resuscitation, renal failure, hypoalbuminemia.  Much as this will be handled via dialysis.  Echocardiogram with good ejection fraction.  Grade 1 diastolic dysfunction.  Defer IV fluid management to nephrology.  Stage IIIc melanoma: Followed by [facility].  Anemia: Labs presently consistent with folate deficiency, anemia of  chronic disease with a mild degree of iron deficiency.  Folate and iron supplements initiated.    ADHD/PTSD/anxiety: Resume home medications.      Plan:  Please see above.  Overall markedly improved.  Discharge planning.  Discussed with patient.  Discussed with CCP.    Jeffrey Porter MD  10/16/2023  12:27 EDT

## 2023-10-16 NOTE — PLAN OF CARE
Goal Outcome Evaluation:  Plan of Care Reviewed With: patient           Outcome Evaluation: Pt. is an 63 year old Male admitted to the hospital with AMS and septic shock. Pt. reports that prior to admission he was independent with functional mobility and used a cane during ambulation. Pt. currently presents with decreased strength, decreased balance, and decreased tolerance to functional activity. This AM, pt. able to ambulate 30 feet, CGA x 1, with no use of A.D. Pt. requires CGA x 1 for sit <-> stand transfers. Mild unsteadiness during ambulation but no overt losses of balance noted. Pt. will benefit from skilled inpt. P.T. to address his functional deficits and to assist pt. in regaining his maximum level of independence with functional mobility.      Anticipated Discharge Disposition (PT): home with assist, home with home health, skilled nursing facility (All pending pt. progress)

## 2023-10-16 NOTE — THERAPY EVALUATION
Patient Name: Delvin Cardozo  : 1960    MRN: [id number]                              Today's Date: 10/16/2023       Admit Date: 10/11/2023    Visit Dx:     ICD-10-CM ICD-9-CM   1. Septic shock  A41.9 038.9    R65.21 785.52     995.92   2. Hypotension, unspecified hypotension type  I95.9 458.9     Patient Active Problem List   Diagnosis    Septic shock     Past Medical History:   Diagnosis Date    BURAK (acute kidney injury)     Diabetes mellitus     Dialysis patient     new onset     Hypertension     Melanoma     Tumor     left flank     No past surgical history on file.   General Information       Row Name 10/16/23 1053          Physical Therapy Time and Intention    Document Type evaluation  Pt. admitted with AMS; Septic Shock  -MS     Mode of Treatment physical therapy;individual therapy  -MS       Row Name 10/16/23 1053          General Information    Patient Profile Reviewed yes  -MS     Prior Level of Function independent:  Use of Cane for ambulation  -MS     Existing Precautions/Restrictions fall   No chair alarm in place prior to therapy session.  -MS     Barriers to Rehab none identified  -MS       Row Name 10/16/23 1053          Cognition    Orientation Status (Cognition) oriented x 3  -MS       Row Name 10/16/23 1053          Safety Issues, Functional Mobility    Comment, Safety Issues/Impairments (Mobility) Gait belt used for safety.  -MS               User Key  (r) = Recorded By, (t) = Taken By, (c) = Cosigned By      Initials Name Provider Type    MS Douglas Betancourt, PT Physical Therapist                   Mobility       Row Name 10/16/23 1054          Bed Mobility    Comment, (Bed Mobility) Up in chair this AM.  -MS       Row Name 10/16/23 1054          Sit-Stand Transfer    Sit-Stand Hernando (Transfers) contact guard  -MS       Row Name 10/16/23 1054          Gait/Stairs (Locomotion)    Hernando Level (Gait) contact guard  -MS     Distance in Feet (Gait) 30 feet  -MS      Deviations/Abnormal Patterns (Gait) crispin decreased  -MS     Comment, (Gait/Stairs) Mild unsteadiness but no overt losses of balance noted.  Limited in gait distance due to fatigue.  -MS               User Key  (r) = Recorded By, (t) = Taken By, (c) = Cosigned By      Initials Name Provider Type    Douglas Campbell OMARI, PT Physical Therapist                   Obj/Interventions       Row Name 10/16/23 1055          Range of Motion Comprehensive    Comment, General Range of Motion BUE/LE (WFL's)  -MS       Row Name 10/16/23 1055          Strength Comprehensive (MMT)    Comment, General Manual Muscle Testing (MMT) Assessment BUE/LE (3+/5)  -MS               User Key  (r) = Recorded By, (t) = Taken By, (c) = Cosigned By      Initials Name Provider Type    Douglas Campbell OMARI, PT Physical Therapist                   Goals/Plan       Row Name 10/16/23 1056          Bed Mobility Goal 1 (PT)    Activity/Assistive Device (Bed Mobility Goal 1, PT) bed mobility activities, all  -MS     Vance Level/Cues Needed (Bed Mobility Goal 1, PT) standby assist  -MS     Time Frame (Bed Mobility Goal 1, PT) long term goal (LTG);1 week  -MS       Row Name 10/16/23 1051          Transfer Goal 1 (PT)    Activity/Assistive Device (Transfer Goal 1, PT) transfers, all  -MS     Vance Level/Cues Needed (Transfer Goal 1, PT) standby assist  -MS     Time Frame (Transfer Goal 1, PT) long term goal (LTG);1 week  -MS       Row Name 10/16/23 1057          Gait Training Goal 1 (PT)    Activity/Assistive Device (Gait Training Goal 1, PT) gait (walking locomotion)  With or without AAD  -MS     Vance Level (Gait Training Goal 1, PT) standby assist  -MS     Distance (Gait Training Goal 1, PT) 100 feet  -MS     Time Frame (Gait Training Goal 1, PT) long term goal (LTG);1 week  -MS       Row Name 10/16/23 1056          Therapy Assessment/Plan (PT)    Planned Therapy Interventions (PT) balance training;bed mobility training;gait  training;home exercise program;patient/family education;postural re-education;transfer training;strengthening  -MS               User Key  (r) = Recorded By, (t) = Taken By, (c) = Cosigned By      Initials Name Provider Type    Douglas Campbell, PT Physical Therapist                   Clinical Impression       Row Name 10/16/23 1055          Pain    Pretreatment Pain Rating 0/10 - no pain  -MS     Posttreatment Pain Rating 0/10 - no pain  -MS       Row Name 10/16/23 1055          Plan of Care Review    Plan of Care Reviewed With patient  -MS       Row Name 10/16/23 1055          Therapy Assessment/Plan (PT)    Rehab Potential (PT) good, to achieve stated therapy goals  -MS     Criteria for Skilled Interventions Met (PT) skilled treatment is necessary  -MS     Therapy Frequency (PT) 6 times/wk  -MS       Row Name 10/16/23 1055          Positioning and Restraints    Pre-Treatment Position sitting in chair/recliner  -MS     Post Treatment Position chair  -MS     In Chair notified nsg;reclined;sitting;call light within reach;encouraged to call for assist  All lines intact.  -MS               User Key  (r) = Recorded By, (t) = Taken By, (c) = Cosigned By      Initials Name Provider Type    Douglas Campbell, PT Physical Therapist                   Outcome Measures       Row Name 10/16/23 1056 10/16/23 0300       How much help from another person do you currently need...    Turning from your back to your side while in flat bed without using bedrails? 3  -MS 3  -DB    Moving from lying on back to sitting on the side of a flat bed without bedrails? 3  -MS 4  -DB    Moving to and from a bed to a chair (including a wheelchair)? 3  -MS 3  -DB    Standing up from a chair using your arms (e.g., wheelchair, bedside chair)? 3  -MS 3  -DB    Climbing 3-5 steps with a railing? 3  -MS 3  -DB    To walk in hospital room? 3  -MS 3  -DB    AM-PAC 6 Clicks Score (PT) 18  -MS 19  -DB    Highest level of mobility 6 --> Walked 10  steps or more  -MS 6 --> Walked 10 steps or more  -DB      Row Name 10/16/23 1056          Functional Assessment    Outcome Measure Options AM-PAC 6 Clicks Basic Mobility (PT)  -MS               User Key  (r) = Recorded By, (t) = Taken By, (c) = Cosigned By      Initials Name Provider Type    MS BetancourtDouglas, PT Physical Therapist    Tracy Yi RN Registered Nurse                                 Physical Therapy Education       Title: PT OT SLP Therapies (In Progress)       Topic: Physical Therapy (In Progress)       Point: Mobility training (Done)       Learning Progress Summary             Patient Acceptance, E,D, VU,NR by MS at 10/16/2023 1056                         Point: Home exercise program (Not Started)       Learner Progress:  Not documented in this visit.              Point: Body mechanics (Done)       Learning Progress Summary             Patient Acceptance, E,D, VU,NR by MS at 10/16/2023 1056                         Point: Precautions (Done)       Learning Progress Summary             Patient Acceptance, E,D, VU,NR by MS at 10/16/2023 1056                                         User Key       Initials Effective Dates Name Provider Type Discipline    MS 06/16/21 -  BetancourtDouglas PT Physical Therapist PT                  PT Recommendation and Plan  Planned Therapy Interventions (PT): balance training, bed mobility training, gait training, home exercise program, patient/family education, postural re-education, transfer training, strengthening  Plan of Care Reviewed With: patient  Outcome Evaluation: Pt. is an 63 year old Male admitted to the hospital with AMS and septic shock. Pt. reports that prior to admission he was independent with functional mobility and used a cane during ambulation. Pt. currently presents with decreased strength, decreased balance, and decreased tolerance to functional activity. This AM, pt. able to ambulate 30 feet, CGA x 1, with no use of A.D. Pt. requires  CGA x 1 for sit <-> stand transfers. Mild unsteadiness during ambulation but no overt losses of balance noted. Pt. will benefit from skilled inpt. P.T. to address his functional deficits and to assist pt. in regaining his maximum level of independence with functional mobility.     Time Calculation:         PT Charges       Row Name 10/16/23 1059             Time Calculation    Start Time 0950  -MS      Stop Time 1005  -MS      Time Calculation (min) 15 min  -MS      PT Received On 10/16/23  -MS      PT - Next Appointment 10/17/23  -MS      PT Goal Re-Cert Due Date 10/23/23  -MS         Time Calculation- PT    Total Timed Code Minutes- PT 14 minute(s)  -MS                User Key  (r) = Recorded By, (t) = Taken By, (c) = Cosigned By      Initials Name Provider Type    Douglas Campbell, PT Physical Therapist                  Therapy Charges for Today       Code Description Service Date Service Provider Modifiers Qty    66963022067  PT EVAL MOD COMPLEXITY 2 10/16/2023 Douglas Betancourt, PT GP 1    [id number]  PT THERAPEUTIC ACT EA 15 MIN 10/16/2023 Douglas Betancourt, PT GP 1            PT G-Codes  Outcome Measure Options: AM-PAC 6 Clicks Basic Mobility (PT)  AM-PAC 6 Clicks Score (PT): 18  PT Discharge Summary  Anticipated Discharge Disposition (PT): home with assist, home with home health, skilled nursing facility    Douglas Betancourt, PT  10/16/2023

## 2023-10-16 NOTE — CASE MANAGEMENT/SOCIAL WORK
Continued Stay Note  [facility]     Patient Name: Delvin Cardozo  MRN: [id number]  Today's Date: 10/16/2023    Admit Date: 10/11/2023    Plan: [facility] when medically ready   Discharge Plan       Row Name 10/16/23 1405       Plan    Plan [facility] when medically ready    Patient/Family in Agreement with Plan yes    Plan Comments Epic denials for [facility], [facility] and [facility] for [facility]F with onsite HD. Rico/Liseth notified regarding Epic acceptance for [facility]. Rico/Liseth stated no beds available at [facility]. CCP met with pt at bedside to update. CCP explained referrals made to SNF with onsite HD and no beds available at any facilities but pt can return to [facility]. Pt verbalized understanding and is agreeable to [facility]. LVM for Bj, pts son, to update. Partial packet in CCP office, pharmacy updated to [facility] in [facility]. Spike RUSSELL/CCP                   Discharge Codes    No documentation.                       Kasandra Arrington RN

## 2023-10-16 NOTE — PLAN OF CARE
Goal Outcome Evaluation:  Plan of Care Reviewed With: patient        Progress: improving  Outcome Evaluation: vss, ra, sr, a/o x4. Falls precautions in place. Transfer to med/surg when bed available.

## 2023-10-16 NOTE — PROGRESS NOTES
ID NOTE    CC: f/u hypotension    Subj: No acute events. NO fever. Stable off of antibiotics. No new symptoms to report.     Medications:    Current Facility-Administered Medications:     acetaminophen (TYLENOL) tablet 650 mg, 650 mg, Oral, Q4H PRN **OR** acetaminophen (TYLENOL) suppository 650 mg, 650 mg, Rectal, Q6H PRN, Jesus Wilcox Jr., MD    amphetamine-dextroamphetamine (ADDERALL) tablet 5 mg, 5 mg, Oral, TID, Jeffrey Porter MD, 5 mg at 10/16/23 0903    sennosides-docusate (PERICOLACE) 8.6-50 MG per tablet 2 tablet, 2 tablet, Oral, BID, 2 tablet at 10/14/23 0847 **AND** polyethylene glycol (MIRALAX) packet 17 g, 17 g, Oral, Daily PRN **AND** bisacodyl (DULCOLAX) EC tablet 5 mg, 5 mg, Oral, Daily PRN **AND** bisacodyl (DULCOLAX) suppository 10 mg, 10 mg, Rectal, Daily PRN, Jesus Wilcox Jr., MD    calcium carbonate (TUMS) chewable tablet 500 mg (200 mg elemental), 2 tablet, Oral, TID PRN, Doreen Kraus APRN, 2 tablet at 10/13/23 2222    clonazePAM (KlonoPIN) tablet 0.5 mg, 0.5 mg, Oral, BID PRN, Jeffrey Porter MD, 0.5 mg at 10/15/23 2204    dextrose (D50W) (25 g/50 mL) IV injection 25 g, 25 g, Intravenous, Q15 Min PRN, Jesus Wilcox Jr., MD    dextrose (GLUTOSE) oral gel 15 g, 15 g, Oral, Q15 Min PRN, Jesus Wilcox Jr., MD    folic acid (FOLVITE) tablet 1 mg, 1 mg, Oral, Daily, Jeffrey Proter MD, 1 mg at 10/16/23 0903    glucagon (GLUCAGEN) injection 1 mg, 1 mg, Intramuscular, Q15 Min PRN, Jesus Wilcox Jr., MD    heparin (porcine) 5000 UNIT/ML injection 5,000 Units, 5,000 Units, Subcutaneous, Q12H, Jesus Wilcox Jr., MD, 5,000 Units at 10/16/23 0902    heparin (porcine) injection 5,000 Units, 5,000 Units, Intracatheter, PRN, Jesus Wilcox Jr., MD, 3,200 Units at 10/13/23 1630    insulin glargine (LANTUS, SEMGLEE) injection 10 Units, 10 Units, Subcutaneous, Nightly, Jeffrey Porter MD, 10 Units at 10/15/23 2201    insulin regular  (humuLIN R,novoLIN R) injection 4-24 Units, 4-24 Units, Subcutaneous, 4x Daily AC & at Bedtime, Hermann Quiroga MD, 4 Units at 10/14/23 2126    loperamide (IMODIUM) capsule 2 mg, 2 mg, Oral, 4x Daily PRN, Jeffrey Porter MD, 2 mg at 10/15/23 1819    melatonin tablet 3 mg, 3 mg, Oral, Nightly PRN, Doreen Kraus APRN, 3 mg at 10/15/23 2203    nitroglycerin (NITROSTAT) SL tablet 0.4 mg, 0.4 mg, Sublingual, Q5 Min PRN, Jesus Wilcox Jr., MD    ondansetron (ZOFRAN) injection 4 mg, 4 mg, Intravenous, Q6H PRN, Jesus Wilcox Jr., MD    pantoprazole (PROTONIX) EC tablet 40 mg, 40 mg, Oral, BID AC, Jeffrey Porter MD, 40 mg at 10/16/23 0607    predniSONE (DELTASONE) tablet 10 mg, 10 mg, Oral, BID With Meals, Jeffrey Porter MD, 10 mg at 10/16/23 0903    [COMPLETED] Insert Peripheral IV, , , Once **AND** sodium chloride 0.9 % flush 10 mL, 10 mL, Intravenous, PRN, Charlie Avitia III, PA    sodium chloride 0.9 % flush 10 mL, 10 mL, Intravenous, Q12H, Jesus Wilcox Jr., MD, 10 mL at 10/16/23 0904    sodium chloride 0.9 % flush 10 mL, 10 mL, Intravenous, PRN, Jesus Wilcox Jr., MD    sodium chloride 0.9 % infusion 40 mL, 40 mL, Intravenous, PRN, Jesus Wilcox Jr., MD    venlafaxine (EFFEXOR) tablet 75 mg, 75 mg, Oral, Daily, Jeffrey Porter MD, 75 mg at 10/16/23 0903      Objective   Vital Signs   Temp:  [97.7 °F (36.5 °C)-98.6 °F (37 °C)] 98.6 °F (37 °C)  Heart Rate:  [87-97] 97  Resp:  [18] 18  BP: (149-179)/() 149/96    Physical Exam:   General: awake, alert, sitting in chair  Eyes: no scleral icterus, wears eyeglasses  Cardiovascular: NR  Respiratory: normal work of breathing on room air  GI: Abdomen is not distended  :  no Fernandez catheter  Neurological: Alert and oriented x 3  Vasc: R chest HD catheter  w/ scant blood but no erythema    Labs:   CBC, CMP, and blood cultures reviewed today  Lab Results   Component Value Date    WBC 13.95 (H) 10/16/2023     "HGB 8.0 (L) 10/16/2023    HCT 25.4 (L) 10/16/2023    MCV 85.2 10/16/2023     10/16/2023       Lab Results   Component Value Date    GLUCOSE 141 (H) 10/16/2023    BUN 31 (H) 10/16/2023    CREATININE 2.31 (H) 10/16/2023    BCR 13.4 10/16/2023    CO2 22.0 10/16/2023    CALCIUM 8.8 10/16/2023    ALBUMIN 2.7 (L) 10/16/2023    AST 44 (H) 10/16/2023    ALT 30 10/16/2023     Lab Results   Component Value Date    HGBA1C 6.90 (H) 10/14/2023     Microbiology:  10/11  RPP: negative  10/11 BCx: negative  10/11 UCx: negative  10/12 MRSA nares: negative  10/12 BCx: negative    Prior radiology:  CT CAP:   \"1. Pathologic left axillary, subpectoral, infra-axillary lymph nodes.   Left posterolateral flank cutaneous/subcutaneous mass measuring 9.5 cm.   Extensive hepatic metastatic disease and periportal and to a lesser   degree right cardiophrenic omid enlargement.   2. Small pericardial effusion. Small left pleural effusion. Left lower   lobe consolidation with atelectasis or potential mild infiltrate.   3. 14 mm splenic low-density lesion, potential metastasis. Mild   splenomegaly. Cholelithiasis.   4. Healing left anterior rib fractures. 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  Chronic steroid use  Elevated procalcitonin    He is stable. He is out of the ICU. No fever. Normal BP. His cultures are negative. CT CAP w/o any evidence of infection. He is stable off of antibiotics ~48 hours now. He is hoping for discharge and follow-up w/ the [facility] soon.     Thank you for allowing me to be involved in the care of this patient. Infectious diseases will sign off at this time, but please call me at [phone] if any further ID questions or new ID concerns.      "

## 2023-10-16 NOTE — PLAN OF CARE
Goal Outcome Evaluation:                 Problem: Skin Injury Risk Increased  Goal: Skin Health and Integrity  Outcome: Ongoing, Progressing  Intervention: Optimize Skin Protection  Recent Flowsheet Documentation  Taken 10/16/2023 0000 by Tracy Sorenson RN  Head of Bed (HOB) Positioning: HOB at 15 degrees  Taken 10/15/2023 2200 by Tracy Sorenson RN  Head of Bed (HOB) Positioning: HOB at 30 degrees  Taken 10/15/2023 2000 by Tracy Sorenson RN  Head of Bed (HOB) Positioning: HOB at 30-45 degrees     Problem: Fall Injury Risk  Goal: Absence of Fall and Fall-Related Injury  Outcome: Ongoing, Progressing  Intervention: Promote Injury-Free Environment  Recent Flowsheet Documentation  Taken 10/16/2023 0000 by Tracy Sorenson RN  Safety Promotion/Fall Prevention:   clutter free environment maintained   fall prevention program maintained   lighting adjusted   nonskid shoes/slippers when out of bed   room organization consistent   safety round/check completed  Taken 10/15/2023 2200 by Tracy Sorenson RN  Safety Promotion/Fall Prevention:   clutter free environment maintained   fall prevention program maintained   lighting adjusted   nonskid shoes/slippers when out of bed   room organization consistent   safety round/check completed  Taken 10/15/2023 2000 by Tracy Sorenson RN  Safety Promotion/Fall Prevention:   clutter free environment maintained   fall prevention program maintained   lighting adjusted   nonskid shoes/slippers when out of bed   room organization consistent   safety round/check completed     Problem: Impaired Wound Healing  Goal: Optimal Wound Healing  Outcome: Ongoing, Progressing     Problem: Adjustment to Illness (Sepsis/Septic Shock)  Goal: Optimal Coping  Outcome: Ongoing, Progressing     Problem: Bleeding (Sepsis/Septic Shock)  Goal: Absence of Bleeding  Outcome: Ongoing, Progressing     Problem: Glycemic Control Impaired (Sepsis/Septic Shock)  Goal: Blood Glucose Level Within  Desired Range  Outcome: Ongoing, Progressing     Problem: Infection Progression (Sepsis/Septic Shock)  Goal: Absence of Infection Signs and Symptoms  Outcome: Ongoing, Progressing  Intervention: Initiate Sepsis Management  Recent Flowsheet Documentation  Taken 10/15/2023 2000 by Tracy Sorenson, RN  Infection Prevention:   single patient room provided   hand hygiene promoted     Problem: Nutrition Impaired (Sepsis/Septic Shock)  Goal: Optimal Nutrition Intake  Outcome: Ongoing, Progressing     Problem: Adjustment to Illness (Chronic Kidney Disease)  Goal: Optimal Coping with Chronic Illness  Outcome: Ongoing, Progressing     Problem: Electrolyte Imbalance (Chronic Kidney Disease)  Goal: Electrolyte Balance  Outcome: Ongoing, Progressing     Problem: Fluid Volume Excess (Chronic Kidney Disease)  Goal: Fluid Balance  Outcome: Ongoing, Progressing     Problem: Functional Decline (Chronic Kidney Disease)  Goal: Optimal Functional Ability  Outcome: Ongoing, Progressing     Problem: Hematologic Alteration (Chronic Kidney Disease)  Goal: Absence of Anemia Signs and Symptoms  Outcome: Ongoing, Progressing     Problem: Oral Intake Inadequate (Chronic Kidney Disease)  Goal: Optimal Oral Intake  Outcome: Ongoing, Progressing     Problem: Pain (Chronic Kidney Disease)  Goal: Acceptable Pain Control  Outcome: Ongoing, Progressing     Problem: Renal Function Impairment (Chronic Kidney Disease)  Goal: Minimize Renal Failure Effects  Outcome: Ongoing, Progressing     Problem: Adult Inpatient Plan of Care  Goal: Plan of Care Review  Outcome: Ongoing, Progressing  Goal: Patient-Specific Goal (Individualized)  Outcome: Ongoing, Progressing  Goal: Absence of Hospital-Acquired Illness or Injury  Outcome: Ongoing, Progressing  Intervention: Identify and Manage Fall Risk  Recent Flowsheet Documentation  Taken 10/16/2023 0000 by Tracy Sorenson, RN  Safety Promotion/Fall Prevention:   clutter free environment maintained   fall  prevention program maintained   lighting adjusted   nonskid shoes/slippers when out of bed   room organization consistent   safety round/check completed  Taken 10/15/2023 2200 by Tracy Sorenson RN  Safety Promotion/Fall Prevention:   clutter free environment maintained   fall prevention program maintained   lighting adjusted   nonskid shoes/slippers when out of bed   room organization consistent   safety round/check completed  Taken 10/15/2023 2000 by Tracy Sorenson RN  Safety Promotion/Fall Prevention:   clutter free environment maintained   fall prevention program maintained   lighting adjusted   nonskid shoes/slippers when out of bed   room organization consistent   safety round/check completed  Intervention: Prevent Skin Injury  Recent Flowsheet Documentation  Taken 10/16/2023 0000 by Tracy Sorenson RN  Body Position:   position changed independently   sitting up in bed  Taken 10/15/2023 2200 by Tracy Sorenson RN  Body Position:   position changed independently   sitting up in bed  Taken 10/15/2023 2000 by Tracy Sorenson RN  Body Position:   position changed independently   sitting up in bed  Intervention: Prevent and Manage VTE (Venous Thromboembolism) Risk  Recent Flowsheet Documentation  Taken 10/15/2023 2000 by Tracy Sorenson RN  Range of Motion: ROM (range of motion) performed  Intervention: Prevent Infection  Recent Flowsheet Documentation  Taken 10/15/2023 2000 by Tracy Sorenson RN  Infection Prevention:   single patient room provided   hand hygiene promoted  Goal: Optimal Comfort and Wellbeing  Outcome: Ongoing, Progressing  Intervention: Provide Person-Centered Care  Recent Flowsheet Documentation  Taken 10/15/2023 2000 by Tracy Sorenson RN  Trust Relationship/Rapport:   care explained   questions answered   questions encouraged  Goal: Readiness for Transition of Care  Outcome: Ongoing, Progressing       Continues with stools but denies c/o verbalizes understanding of  need to call and wait for assistance

## 2023-10-16 NOTE — PROGRESS NOTES
[facility] Progress Note      Patient Name: Delvin Cardozo  : 1960  MRN: [id number]  Primary Care Physician:  Provider, No Known  Date of admission: 10/11/2023    Subjective     Interval History:   Follow-up acute kidney injury    Patient is feeling better, no chest pain or shortness of air, no orthopnea or PND, no nausea or vomiting, no abdominal pain, no dysuria or gross hematuria.  Urine output in the past 24 hours was 800 cc.    Review of Systems:   As noted above    Objective     Vitals:   Temp:  [97.7 °F (36.5 °C)-98.6 °F (37 °C)] 98.6 °F (37 °C)  Heart Rate:  [87-97] 97  Resp:  [18] 18  BP: (149-179)/() 149/96    Intake/Output Summary (Last 24 hours) at 10/16/2023 0954  Last data filed at 10/16/2023 0855  Gross per 24 hour   Intake 250 ml   Output 500 ml   Net -250 ml       Physical Exam:    General Appearance: alert, awake, chronically ill, no acute distress   Skin: warm and dry  HEENT: oral mucosa normal, nonicteric sclera  Neck: No JVD, he has tunneled dialysis catheter in the right IJ with exit site below the right clavicle.  Lungs: Bilateral rhonchi, breathing effort not labored  Heart: RRR, normal S1 and S2, no S3, no rub  Abdomen: soft, nontender, nondistended, normoactive bowel  : no palpable bladder  Extremities: 1+ lower extremity edema  Neuro: normal speech and mental status     Scheduled Meds:     amphetamine-dextroamphetamine, 5 mg, Oral, TID  folic acid, 1 mg, Oral, Daily  heparin (porcine), 5,000 Units, Subcutaneous, Q12H  insulin glargine, 10 Units, Subcutaneous, Nightly  insulin regular, 4-24 Units, Subcutaneous, 4x Daily AC & at Bedtime  pantoprazole, 40 mg, Oral, BID AC  predniSONE, 10 mg, Oral, BID With Meals  senna-docusate sodium, 2 tablet, Oral, BID  sodium chloride, 10 mL, Intravenous, Q12H  venlafaxine, 75 mg, Oral, Daily      IV Meds:          Results Reviewed:   I have personally reviewed the results from the time of this admission to  10/16/2023 09:54 EDT     Results from last 7 days   Lab Units 10/16/23  0330 10/15/23  0312 10/14/23  0319   SODIUM mmol/L 137 136 137   POTASSIUM mmol/L 3.2* 3.5 3.5   CHLORIDE mmol/L 102 103 100   CO2 mmol/L 22.0 21.0* 23.0   BUN mg/dL 31* 29* 21   CREATININE mg/dL 2.31* 2.46* 2.32*   CALCIUM mg/dL 8.8 8.7 8.6   BILIRUBIN mg/dL 0.4 0.4 0.4   ALK PHOS U/L 499* 503* 431*   ALT (SGPT) U/L 30 29 26   AST (SGOT) U/L 44* 55* 62*   GLUCOSE mg/dL 141* 146* 144*       Estimated Creatinine Clearance: 34.3 mL/min (A) (by C-G formula based on SCr of 2.31 mg/dL (H)).    Results from last 7 days   Lab Units 10/16/23  0330 10/15/23  0312 10/14/23  0319 10/13/23  0433 10/13/23  0433 10/11/23  2156   MAGNESIUM mg/dL  --   --  2.1  --  2.2 1.3*   PHOSPHORUS mg/dL 3.2 3.2 3.2   < > 5.4*  --     < > = values in this interval not displayed.       Results from last 7 days   Lab Units 10/15/23  0312 10/13/23  0433   URIC ACID mg/dL 7.0 8.6*       Results from last 7 days   Lab Units 10/16/23  0330 10/15/23  0312 10/14/23  0319 10/13/23  0433 10/12/23  0251   WBC 10*3/mm3 13.95* 15.68* 18.52* 21.53* 22.31*   HEMOGLOBIN g/dL 8.0* 7.4* 7.4* 7.6* 8.7*   PLATELETS 10*3/mm3 273 271 284 295 334       Results from last 7 days   Lab Units 10/15/23  0312   INR  1.02       Assessment / Plan     ASSESSMENT:  Acute kidney injury has been requiring dialysis since early September 202 and he was transferred to [facility] and he was dialyzed daily over the [facility].  The etiology of the BURAK probably ATN associated with sepsis, but also he was on immune checkpoint therapy which is known to be associated with the interstitial nephritis, creatinine today is 2.31, potassium 3.2 urine output is increasing, we will not dialyze today.    Septic shock improved since admission, the concern if he had catheter related sepsis will follow the recommendation of ID.  Metastatic melanoma previously on immunotherapy  Adrenal insufficiency currently on steroid  Hypomagnesemia  improved, last magnesium was 2.1    PLAN:  No dialysis today reevaluate in the next 24 hours and decide if dialysis will be needed  Replete potassium  Surveillance labs    I reviewed the chart and other providers notes, I reviewed imaging and lab data.  I discussed the case with the patient and he voiced good understanding  Copied text in this note has been reviewed and is accurate as of 10/16/23.       Thank you for involving us in the care of Delvin Cardozo.  Please feel free to call with any questions.    Antoine Hay MD  10/16/23  09:54 EDT    [facility]  [phone]    Please note that portions of this note were completed with a voice recognition program.

## 2023-10-17 PROBLEM — E27.40 ADRENAL INSUFFICIENCY: Status: ACTIVE | Noted: 2023-10-17

## 2023-10-17 PROBLEM — E11.9 DM (DIABETES MELLITUS), TYPE 2: Status: ACTIVE | Noted: 2023-10-17

## 2023-10-17 PROBLEM — N17.9 AKI (ACUTE KIDNEY INJURY): Status: ACTIVE | Noted: 2023-01-01

## 2023-10-17 PROBLEM — N17.9 AKI (ACUTE KIDNEY INJURY): Status: ACTIVE | Noted: 2023-10-17

## 2023-10-17 PROBLEM — E11.9 DM (DIABETES MELLITUS), TYPE 2: Status: ACTIVE | Noted: 2023-01-01

## 2023-10-17 PROBLEM — E27.40 ADRENAL INSUFFICIENCY: Status: ACTIVE | Noted: 2023-01-01

## 2023-10-17 PROBLEM — C43.9 METASTATIC MELANOMA: Status: ACTIVE | Noted: 2023-10-17

## 2023-10-17 PROBLEM — C43.9 METASTATIC MELANOMA: Status: ACTIVE | Noted: 2023-01-01

## 2023-10-17 LAB
ALBUMIN SERPL-MCNC: 2.5 G/DL (ref 3.5–5.2)
ALBUMIN/GLOB SERPL: 0.8 G/DL
ALP SERPL-CCNC: 526 U/L (ref 39–117)
ALT SERPL W P-5'-P-CCNC: 38 U/L (ref 1–41)
AMMONIA BLD-SCNC: 37 UMOL/L (ref 16–60)
ANION GAP SERPL CALCULATED.3IONS-SCNC: 12.1 MMOL/L (ref 5–15)
ANISOCYTOSIS BLD QL: ABNORMAL
AST SERPL-CCNC: 58 U/L (ref 1–40)
BACTERIA SPEC AEROBE CULT: NORMAL
BILIRUB SERPL-MCNC: 0.5 MG/DL (ref 0–1.2)
BUN SERPL-MCNC: 29 MG/DL (ref 8–23)
BUN/CREAT SERPL: 14.9 (ref 7–25)
CALCIUM SPEC-SCNC: 8.8 MG/DL (ref 8.6–10.5)
CHLORIDE SERPL-SCNC: 105 MMOL/L (ref 98–107)
CO2 SERPL-SCNC: 20.9 MMOL/L (ref 22–29)
CORTIS SERPL-MCNC: 1.62 MCG/DL
CORTIS SERPL-MCNC: 1.65 MCG/DL
CREAT SERPL-MCNC: 1.95 MG/DL (ref 0.76–1.27)
DEPRECATED RDW RBC AUTO: 46.8 FL (ref 37–54)
EGFRCR SERPLBLD CKD-EPI 2021: 38.2 ML/MIN/1.73
EOSINOPHIL # BLD MANUAL: 0.14 10*3/MM3 (ref 0–0.4)
EOSINOPHIL NFR BLD MANUAL: 1 % (ref 0.3–6.2)
ERYTHROCYTE [DISTWIDTH] IN BLOOD BY AUTOMATED COUNT: 15.2 % (ref 12.3–15.4)
GLOBULIN UR ELPH-MCNC: 3 GM/DL
GLUCOSE BLDC GLUCOMTR-MCNC: 130 MG/DL (ref 70–130)
GLUCOSE BLDC GLUCOMTR-MCNC: 136 MG/DL (ref 70–130)
GLUCOSE BLDC GLUCOMTR-MCNC: 63 MG/DL (ref 70–130)
GLUCOSE BLDC GLUCOMTR-MCNC: 75 MG/DL (ref 70–130)
GLUCOSE BLDC GLUCOMTR-MCNC: 84 MG/DL (ref 70–130)
GLUCOSE BLDC GLUCOMTR-MCNC: 86 MG/DL (ref 70–130)
GLUCOSE SERPL-MCNC: 63 MG/DL (ref 65–99)
HCT VFR BLD AUTO: 25.4 % (ref 37.5–51)
HGB BLD-MCNC: 7.9 G/DL (ref 13–17.7)
LYMPHOCYTES # BLD MANUAL: 1.86 10*3/MM3 (ref 0.7–3.1)
LYMPHOCYTES NFR BLD MANUAL: 2 % (ref 5–12)
MAGNESIUM SERPL-MCNC: 1.5 MG/DL (ref 1.6–2.4)
MCH RBC QN AUTO: 26.7 PG (ref 26.6–33)
MCHC RBC AUTO-ENTMCNC: 31.1 G/DL (ref 31.5–35.7)
MCV RBC AUTO: 85.8 FL (ref 79–97)
MICROCYTES BLD QL: ABNORMAL
MONOCYTES # BLD: 0.28 10*3/MM3 (ref 0.1–0.9)
NEUTROPHILS # BLD AUTO: 11.69 10*3/MM3 (ref 1.7–7)
NEUTROPHILS NFR BLD MANUAL: 83.7 % (ref 42.7–76)
NRBC BLD AUTO-RTO: 0.3 /100 WBC (ref 0–0.2)
PHOSPHATE SERPL-MCNC: 2.7 MG/DL (ref 2.5–4.5)
PLAT MORPH BLD: NORMAL
PLATELET # BLD AUTO: 272 10*3/MM3 (ref 140–450)
PMV BLD AUTO: 9.7 FL (ref 6–12)
POLYCHROMASIA BLD QL SMEAR: ABNORMAL
POTASSIUM SERPL-SCNC: 3.2 MMOL/L (ref 3.5–5.2)
PROT SERPL-MCNC: 5.5 G/DL (ref 6–8.5)
RBC # BLD AUTO: 2.96 10*6/MM3 (ref 4.14–5.8)
RETICS # AUTO: 0.1 10*6/MM3 (ref 0.02–0.13)
RETICS/RBC NFR AUTO: 3.5 % (ref 0.7–1.9)
SODIUM SERPL-SCNC: 138 MMOL/L (ref 136–145)
URATE SERPL-MCNC: 7.9 MG/DL (ref 3.4–7)
VARIANT LYMPHS NFR BLD MANUAL: 13.3 % (ref 19.6–45.3)
WBC MORPH BLD: NORMAL
WBC NRBC COR # BLD: 13.97 10*3/MM3 (ref 3.4–10.8)

## 2023-10-17 PROCEDURE — 84100 ASSAY OF PHOSPHORUS: CPT | Performed by: HOSPITALIST

## 2023-10-17 PROCEDURE — 80053 COMPREHEN METABOLIC PANEL: CPT | Performed by: HOSPITALIST

## 2023-10-17 PROCEDURE — 85045 AUTOMATED RETICULOCYTE COUNT: CPT | Performed by: HOSPITALIST

## 2023-10-17 PROCEDURE — 83735 ASSAY OF MAGNESIUM: CPT | Performed by: HOSPITALIST

## 2023-10-17 PROCEDURE — 25010000002 HEPARIN (PORCINE) PER 1000 UNITS: Performed by: HOSPITALIST

## 2023-10-17 PROCEDURE — 82948 REAGENT STRIP/BLOOD GLUCOSE: CPT

## 2023-10-17 PROCEDURE — 82533 TOTAL CORTISOL: CPT | Performed by: HOSPITALIST

## 2023-10-17 PROCEDURE — 63710000001 INSULIN GLARGINE PER 5 UNITS: Performed by: INTERNAL MEDICINE

## 2023-10-17 PROCEDURE — 25010000002 MAGNESIUM SULFATE IN D5W 1G/100ML (PREMIX) 1-5 GM/100ML-% SOLUTION: Performed by: INTERNAL MEDICINE

## 2023-10-17 PROCEDURE — 84550 ASSAY OF BLOOD/URIC ACID: CPT | Performed by: HOSPITALIST

## 2023-10-17 PROCEDURE — 82140 ASSAY OF AMMONIA: CPT | Performed by: HOSPITALIST

## 2023-10-17 PROCEDURE — 85025 COMPLETE CBC W/AUTO DIFF WBC: CPT | Performed by: HOSPITALIST

## 2023-10-17 PROCEDURE — 63710000001 PREDNISONE PER 5 MG: Performed by: HOSPITALIST

## 2023-10-17 PROCEDURE — 85007 BL SMEAR W/DIFF WBC COUNT: CPT | Performed by: HOSPITALIST

## 2023-10-17 RX ORDER — INSULIN LISPRO 100 [IU]/ML
2-7 INJECTION, SOLUTION INTRAVENOUS; SUBCUTANEOUS
Status: DISCONTINUED | OUTPATIENT
Start: 2023-10-17 | End: 2023-10-20 | Stop reason: HOSPADM

## 2023-10-17 RX ORDER — MAGNESIUM SULFATE 1 G/100ML
1 INJECTION INTRAVENOUS
Status: DISPENSED | OUTPATIENT
Start: 2023-10-17 | End: 2023-10-17

## 2023-10-17 RX ORDER — POTASSIUM CHLORIDE 750 MG/1
40 TABLET, FILM COATED, EXTENDED RELEASE ORAL
Status: COMPLETED | OUTPATIENT
Start: 2023-10-17 | End: 2023-10-17

## 2023-10-17 RX ADMIN — PANTOPRAZOLE SODIUM 40 MG: 40 TABLET, DELAYED RELEASE ORAL at 18:40

## 2023-10-17 RX ADMIN — HEPARIN SODIUM 5000 UNITS: 5000 INJECTION INTRAVENOUS; SUBCUTANEOUS at 08:01

## 2023-10-17 RX ADMIN — PREDNISONE 10 MG: 10 TABLET ORAL at 18:40

## 2023-10-17 RX ADMIN — DEXTROAMPHETAMINE SACCHARATE, AMPHETAMINE ASPARTATE, DEXTROAMPHETAMINE SULFATE, AMPHETAMINE SULFATE TABLETS, 5 MG,CLL 5 MG: 1.25; 1.25; 1.25; 1.25 TABLET ORAL at 20:26

## 2023-10-17 RX ADMIN — CLONAZEPAM 0.5 MG: 0.5 TABLET ORAL at 04:24

## 2023-10-17 RX ADMIN — INSULIN GLARGINE 5 UNITS: 100 INJECTION, SOLUTION SUBCUTANEOUS at 20:27

## 2023-10-17 RX ADMIN — POTASSIUM CHLORIDE 40 MEQ: 750 TABLET, EXTENDED RELEASE ORAL at 09:58

## 2023-10-17 RX ADMIN — POTASSIUM CHLORIDE 40 MEQ: 750 TABLET, EXTENDED RELEASE ORAL at 11:06

## 2023-10-17 RX ADMIN — PANTOPRAZOLE SODIUM 40 MG: 40 TABLET, DELAYED RELEASE ORAL at 06:14

## 2023-10-17 RX ADMIN — PREDNISONE 10 MG: 10 TABLET ORAL at 08:01

## 2023-10-17 RX ADMIN — MAGNESIUM SULFATE IN DEXTROSE 1 G: 10 INJECTION, SOLUTION INTRAVENOUS at 11:06

## 2023-10-17 RX ADMIN — MAGNESIUM SULFATE IN DEXTROSE 1 G: 10 INJECTION, SOLUTION INTRAVENOUS at 09:58

## 2023-10-17 RX ADMIN — HEPARIN SODIUM 5000 UNITS: 5000 INJECTION INTRAVENOUS; SUBCUTANEOUS at 20:26

## 2023-10-17 RX ADMIN — DEXTROAMPHETAMINE SACCHARATE, AMPHETAMINE ASPARTATE, DEXTROAMPHETAMINE SULFATE, AMPHETAMINE SULFATE TABLETS, 5 MG,CLL 5 MG: 1.25; 1.25; 1.25; 1.25 TABLET ORAL at 08:01

## 2023-10-17 RX ADMIN — LOPERAMIDE HYDROCHLORIDE 2 MG: 2 CAPSULE ORAL at 09:58

## 2023-10-17 RX ADMIN — VENLAFAXINE HYDROCHLORIDE 75 MG: 75 TABLET ORAL at 08:01

## 2023-10-17 RX ADMIN — Medication 10 ML: at 08:01

## 2023-10-17 RX ADMIN — FOLIC ACID 1 MG: 1 TABLET ORAL at 08:01

## 2023-10-17 RX ADMIN — Medication 10 ML: at 20:30

## 2023-10-17 NOTE — CASE MANAGEMENT/SOCIAL WORK
Continued Stay Note  [facility]     Patient Name: Delvin Cardozo  MRN: [id number]  Today's Date: 10/17/2023    Admit Date: 10/11/2023    Plan: SNF referrals pending   Discharge Plan       Row Name 10/17/23 1131       Plan    Plan SNF referrals pending    Patient/Family in Agreement with Plan yes    Plan Comments CCP received outbound call from Eddi, pts brother, regarding DC planning update. CCP explained previous referrals made to additional SNF with onsite HD and no beds available. Eddi stated he s/w Nephrology who states HD catheter to be removed tomorrow and no additional needs for HD. Eddi stated he would like additional referral made to SNF in the [place] and a referral to [facility] rehab. Epic referrals made to [facility], [facility], [facility], [facility], [facility], [facility], [facility], [facility], [facility], [facility], [facility] and [facility]. [facility] notified of referrals. No beds available at [facility], [facility], [facility] and [facility]. Spike RUSSELL/CCP                   Discharge Codes    No documentation.                 Expected Discharge Date and Time       Expected Discharge Date Expected Discharge Time    Oct 17, 2023               Kasandra Arrington RN

## 2023-10-17 NOTE — PROGRESS NOTES
[facility]"" Progress Note      Patient Name: Delvin Cardozo  : 1960  MRN: [id number]  Primary Care Physician:  Provider, No Known  Date of admission: 10/11/2023    Subjective     Interval History:   Follow-up acute kidney injury    Patient is feeling better, no chest pain or shortness of air, no orthopnea or PND, no nausea or vomiting, no abdominal pain, no dysuria or gross hematuria.  Urine output in the past 24 hours was not recorded    Review of Systems:   As noted above    Objective     Vitals:   Temp:  [97.5 °F (36.4 °C)] 97.5 °F (36.4 °C)  Heart Rate:  [78-97] 78  Resp:  [18] 18  BP: (134-166)/(75-99) 166/79    Intake/Output Summary (Last 24 hours) at 10/17/2023 0857  Last data filed at 10/17/2023 0806  Gross per 24 hour   Intake 240 ml   Output 175 ml   Net 65 ml       Physical Exam:    General Appearance: alert, awake, chronically ill, no acute distress   Skin: warm and dry  HEENT: oral mucosa normal, nonicteric sclera  Neck: No JVD, he has tunneled dialysis catheter in the right IJ with exit site below the right clavicle.  Lungs: Bilateral rhonchi, breathing effort not labored  Heart: RRR, normal S1 and S2, no S3, no rub  Abdomen: soft, nontender, nondistended, normoactive bowel  : no palpable bladder  Extremities: 1+ lower extremity edema  Neuro: normal speech and mental status     Scheduled Meds:     amphetamine-dextroamphetamine, 5 mg, Oral, TID  folic acid, 1 mg, Oral, Daily  heparin (porcine), 5,000 Units, Subcutaneous, Q12H  insulin glargine, 10 Units, Subcutaneous, Nightly  insulin regular, 4-24 Units, Subcutaneous, 4x Daily AC & at Bedtime  pantoprazole, 40 mg, Oral, BID AC  predniSONE, 10 mg, Oral, BID With Meals  senna-docusate sodium, 2 tablet, Oral, BID  sodium chloride, 10 mL, Intravenous, Q12H  venlafaxine, 75 mg, Oral, Daily      IV Meds:          Results Reviewed:   I have personally reviewed the results from the time of this admission to 10/17/2023 08:57  EDT     Results from last 7 days   Lab Units 10/17/23  0609 10/16/23  0330 10/15/23  0312   SODIUM mmol/L 138 137 136   POTASSIUM mmol/L 3.2* 3.2* 3.5   CHLORIDE mmol/L 105 102 103   CO2 mmol/L 20.9* 22.0 21.0*   BUN mg/dL 29* 31* 29*   CREATININE mg/dL 1.95* 2.31* 2.46*   CALCIUM mg/dL 8.8 8.8 8.7   BILIRUBIN mg/dL 0.5 0.4 0.4   ALK PHOS U/L 526* 499* 503*   ALT (SGPT) U/L 38 30 29   AST (SGOT) U/L 58* 44* 55*   GLUCOSE mg/dL 63* 141* 146*       Estimated Creatinine Clearance: 40.5 mL/min (A) (by C-G formula based on SCr of 1.95 mg/dL (H)).    Results from last 7 days   Lab Units 10/17/23  0609 10/16/23  0330 10/15/23  0312 10/14/23  0319 10/13/23  0433   MAGNESIUM mg/dL 1.5*  --   --  2.1 2.2   PHOSPHORUS mg/dL 2.7 3.2 3.2 3.2 5.4*       Results from last 7 days   Lab Units 10/17/23  0609 10/15/23  0312 10/13/23  0433   URIC ACID mg/dL 7.9* 7.0 8.6*       Results from last 7 days   Lab Units 10/17/23  0609 10/16/23  0330 10/15/23  0312 10/14/23  0319 10/13/23  0433   WBC 10*3/mm3 13.97* 13.95* 15.68* 18.52* 21.53*   HEMOGLOBIN g/dL 7.9* 8.0* 7.4* 7.4* 7.6*   PLATELETS 10*3/mm3 272 273 271 284 295       Results from last 7 days   Lab Units 10/15/23  0312   INR  1.02       Assessment / Plan     ASSESSMENT:  Acute kidney injury has been requiring dialysis since early September 202 and he was transferred to [facility] and he was dialyzed daily over the [facility].  The etiology of the BURAK probably ATN associated with sepsis, but also he was on immune checkpoint therapy which is known to be associated with the interstitial nephritis, creatinine today is down to 1.95 potassium 3.2 urine output is increasing, we will not dialyze today.    Septic shock resolved..  Metastatic melanoma previously on immunotherapy  Adrenal insufficiency currently on steroid  Hypomagnesemia improved, magnesium today down to 1.5    PLAN:  No dialysis today reevaluate in the next 24 hours and decide if dialysis will be needed  Replete potassium and  magnesium  If the renal function continued to improve will consider requesting removal of the tunneled catheter in the next 24 hours.  Surveillance labs    I reviewed the chart and other providers notes, I reviewed imaging and lab data.  I discussed the case with the patient and he voiced good understanding  Copied text in this note has been reviewed and is accurate as of 10/17/23.       Thank you for involving us in the care of Delvin Cardozo.  Please feel free to call with any questions.    Antoine Hay MD  10/17/23  08:57 EDT    [facility]  [phone]    Please note that portions of this note were completed with a voice recognition program.

## 2023-10-17 NOTE — PLAN OF CARE
Problem: Skin Injury Risk Increased  Goal: Skin Health and Integrity  Outcome: Ongoing, Progressing  Intervention: Optimize Skin Protection  Recent Flowsheet Documentation  Taken 10/17/2023 0200 by Tracy Sorenson RN  Head of Bed (HOB) Positioning: HOB at 20 degrees  Taken 10/17/2023 0000 by Tracy Sorenson RN  Head of Bed (HOB) Positioning: HOB at 20 degrees  Taken 10/16/2023 2200 by Tracy Sorenson RN  Head of Bed (HOB) Positioning: HOB at 20 degrees  Taken 10/16/2023 2000 by Tracy Sorenson RN  Head of Bed (HOB) Positioning: HOB at 20-30 degrees     Problem: Fall Injury Risk  Goal: Absence of Fall and Fall-Related Injury  Outcome: Ongoing, Progressing  Intervention: Promote Injury-Free Environment  Recent Flowsheet Documentation  Taken 10/17/2023 0200 by Tracy Sorenson RN  Safety Promotion/Fall Prevention:   clutter free environment maintained   fall prevention program maintained   lighting adjusted   nonskid shoes/slippers when out of bed   safety round/check completed   room organization consistent  Taken 10/17/2023 0000 by Tracy Sorenson RN  Safety Promotion/Fall Prevention:   clutter free environment maintained   fall prevention program maintained   lighting adjusted   nonskid shoes/slippers when out of bed   room organization consistent   safety round/check completed  Taken 10/16/2023 2200 by Tracy Sorenson RN  Safety Promotion/Fall Prevention:   clutter free environment maintained   fall prevention program maintained   lighting adjusted   nonskid shoes/slippers when out of bed   room organization consistent   safety round/check completed  Taken 10/16/2023 2000 by Tracy Sorenson RN  Safety Promotion/Fall Prevention:   activity supervised   clutter free environment maintained   fall prevention program maintained   lighting adjusted   nonskid shoes/slippers when out of bed   room organization consistent   safety round/check completed     Problem: Impaired Wound Healing  Goal:  Optimal Wound Healing  Outcome: Ongoing, Progressing  Intervention: Promote Wound Healing  Recent Flowsheet Documentation  Taken 10/16/2023 2000 by Tracy Sorenson RN  Activity Management: ambulated to bathroom     Problem: Adjustment to Illness (Sepsis/Septic Shock)  Goal: Optimal Coping  Outcome: Ongoing, Progressing     Problem: Bleeding (Sepsis/Septic Shock)  Goal: Absence of Bleeding  Outcome: Ongoing, Progressing     Problem: Glycemic Control Impaired (Sepsis/Septic Shock)  Goal: Blood Glucose Level Within Desired Range  Outcome: Ongoing, Progressing     Problem: Infection Progression (Sepsis/Septic Shock)  Goal: Absence of Infection Signs and Symptoms  Outcome: Ongoing, Progressing  Intervention: Initiate Sepsis Management  Recent Flowsheet Documentation  Taken 10/16/2023 2000 by Tracy Sorenson RN  Infection Prevention:   single patient room provided   hand hygiene promoted  Intervention: Promote Recovery  Recent Flowsheet Documentation  Taken 10/16/2023 2000 by Tracy Sorenson, RN  Activity Management: ambulated to bathroom     Problem: Nutrition Impaired (Sepsis/Septic Shock)  Goal: Optimal Nutrition Intake  Outcome: Ongoing, Progressing     Problem: Adjustment to Illness (Chronic Kidney Disease)  Goal: Optimal Coping with Chronic Illness  Outcome: Ongoing, Progressing     Problem: Electrolyte Imbalance (Chronic Kidney Disease)  Goal: Electrolyte Balance  Outcome: Ongoing, Progressing     Problem: Fluid Volume Excess (Chronic Kidney Disease)  Goal: Fluid Balance  Outcome: Ongoing, Progressing     Problem: Functional Decline (Chronic Kidney Disease)  Goal: Optimal Functional Ability  Outcome: Ongoing, Progressing  Intervention: Optimize Functional Ability  Recent Flowsheet Documentation  Taken 10/16/2023 2000 by Tracy Sorenson RN  Activity Management: ambulated to bathroom     Problem: Hematologic Alteration (Chronic Kidney Disease)  Goal: Absence of Anemia Signs and Symptoms  Outcome: Ongoing,  Progressing     Problem: Oral Intake Inadequate (Chronic Kidney Disease)  Goal: Optimal Oral Intake  Outcome: Ongoing, Progressing     Problem: Pain (Chronic Kidney Disease)  Goal: Acceptable Pain Control  Outcome: Ongoing, Progressing     Problem: Renal Function Impairment (Chronic Kidney Disease)  Goal: Minimize Renal Failure Effects  Outcome: Ongoing, Progressing     Problem: Adult Inpatient Plan of Care  Goal: Plan of Care Review  Outcome: Ongoing, Progressing  Goal: Patient-Specific Goal (Individualized)  Outcome: Ongoing, Progressing  Goal: Absence of Hospital-Acquired Illness or Injury  Outcome: Ongoing, Progressing  Intervention: Identify and Manage Fall Risk  Recent Flowsheet Documentation  Taken 10/17/2023 0200 by Tracy Sorenson, RN  Safety Promotion/Fall Prevention:   clutter free environment maintained   fall prevention program maintained   lighting adjusted   nonskid shoes/slippers when out of bed   safety round/check completed   room organization consistent  Taken 10/17/2023 0000 by Tracy Sorenson, RN  Safety Promotion/Fall Prevention:   clutter free environment maintained   fall prevention program maintained   lighting adjusted   nonskid shoes/slippers when out of bed   room organization consistent   safety round/check completed  Taken 10/16/2023 2200 by Tracy Sorenson, RN  Safety Promotion/Fall Prevention:   clutter free environment maintained   fall prevention program maintained   lighting adjusted   nonskid shoes/slippers when out of bed   room organization consistent   safety round/check completed  Taken 10/16/2023 2000 by Tracy Sorenson, RN  Safety Promotion/Fall Prevention:   activity supervised   clutter free environment maintained   fall prevention program maintained   lighting adjusted   nonskid shoes/slippers when out of bed   room organization consistent   safety round/check completed  Intervention: Prevent Skin Injury  Recent Flowsheet Documentation  Taken 10/17/2023 0200 by  Tracy Sorenson RN  Body Position:   position changed independently   side-lying  Taken 10/17/2023 0000 by Tracy Sorenson RN  Body Position:   position changed independently   side-lying  Taken 10/16/2023 2200 by Tracy Sorenson RN  Body Position:   position changed independently   side-lying  Taken 10/16/2023 2000 by Tracy Sorenson RN  Body Position:   position changed independently   side-lying  Intervention: Prevent and Manage VTE (Venous Thromboembolism) Risk  Recent Flowsheet Documentation  Taken 10/16/2023 2000 by Tracy Sorenson RN  Activity Management: ambulated to bathroom  Intervention: Prevent Infection  Recent Flowsheet Documentation  Taken 10/16/2023 2000 by Tracy Sorenson RN  Infection Prevention:   single patient room provided   hand hygiene promoted  Goal: Optimal Comfort and Wellbeing  Outcome: Ongoing, Progressing  Goal: Readiness for Transition of Care  Outcome: Ongoing, Progressing   Goal Outcome Evaluation:      Wounds appear unchanged, pt continues to have c/o loose stool states he overate food brought in from home immodium given per MD orders will continue to monitor

## 2023-10-17 NOTE — PROGRESS NOTES
" LOS: 6 days     Name: Delvin Cardozo  Age: 63 y.o.  Sex: male  :  1960  MRN: [id number]         Primary Care Physician: Provider, No Known    Subjective   Subjective  No new complaints or acute overnight events    Objective   Vital Signs  Temp:  [97.5 °F (36.4 °C)] 97.5 °F (36.4 °C)  Heart Rate:  [78-94] 78  Resp:  [18] 18  BP: (134-166)/(75-79) 166/79  Body mass index is 28.68 kg/m².    Objective:  General Appearance:  Comfortable, in no acute distress and ill-appearing (He looks older than stated age and chronically ill).    Vital signs: (most recent): Blood pressure 166/79, pulse 78, temperature 97.5 °F (36.4 °C), temperature source Oral, resp. rate 18, height 170.2 cm (67\"), weight 83.1 kg (183 lb 1.6 oz), SpO2 98%.    Lungs:  Normal effort and normal respiratory rate.  He is not in respiratory distress.  There are decreased breath sounds.    Heart: Normal rate.  Regular rhythm.    Abdomen: Abdomen is soft.  Bowel sounds are normal.   There is no abdominal tenderness.     Extremities: There is no dependent edema or local swelling.    Neurological: Patient is alert and oriented to person, place and time.    Skin:  Warm and dry.                Results Review:       I reviewed the patient's new clinical results.    Results from last 7 days   Lab Units 10/17/23  0609 10/16/23  0330 10/15/23  0312 10/14/23  0319 10/13/23  0433 10/12/23  0251 10/11/23  2156   WBC 10*3/mm3 13.97* 13.95* 15.68* 18.52* 21.53* 22.31* 18.69*   HEMOGLOBIN g/dL 7.9* 8.0* 7.4* 7.4* 7.6* 8.7* 8.3*   PLATELETS 10*3/mm3 272 273 271 284 295 334 379     Results from last 7 days   Lab Units 10/17/23  0609 10/16/23  0330 10/15/23  0312 10/14/23  0319 10/13/23  0433 10/12/23  0251 10/11/23  2156   SODIUM mmol/L 138 137 136 137 136 135* 135*   POTASSIUM mmol/L 3.2* 3.2* 3.5 3.5 3.3* 3.5 3.1*   CHLORIDE mmol/L 105 102 103 100 99 97* 95*   CO2 mmol/L 20.9* 22.0 21.0* 23.0 23.0 26.0 27.5   BUN mg/dL 29* 31* 29* 21 29* 13 10   CREATININE mg/dL " 1.95* 2.31* 2.46* 2.32* 3.00* 2.51* 2.30*   CALCIUM mg/dL 8.8 8.8 8.7 8.6 8.3* 8.5* 8.6   GLUCOSE mg/dL 63* 141* 146* 144* 178* 127* 94     Results from last 7 days   Lab Units 10/15/23  0312   INR  1.02             Scheduled Meds:   amphetamine-dextroamphetamine, 5 mg, Oral, TID  folic acid, 1 mg, Oral, Daily  heparin (porcine), 5,000 Units, Subcutaneous, Q12H  insulin glargine, 10 Units, Subcutaneous, Nightly  insulin lispro, 2-7 Units, Subcutaneous, 4x Daily AC & at Bedtime  pantoprazole, 40 mg, Oral, BID AC  predniSONE, 10 mg, Oral, BID With Meals  senna-docusate sodium, 2 tablet, Oral, BID  sodium chloride, 10 mL, Intravenous, Q12H  venlafaxine, 75 mg, Oral, Daily      PRN Meds:     acetaminophen **OR** acetaminophen    senna-docusate sodium **AND** polyethylene glycol **AND** bisacodyl **AND** bisacodyl    calcium carbonate    clonazePAM    dextrose    dextrose    glucagon (human recombinant)    heparin (porcine)    loperamide    melatonin    nitroglycerin    ondansetron    [COMPLETED] Insert Peripheral IV **AND** sodium chloride    sodium chloride    sodium chloride  Continuous Infusions:       Assessment & Plan   Active Hospital Problems    Diagnosis  POA    **Septic shock [A41.9, R65.21]  Yes    BURAK (acute kidney injury) [N17.9]  Unknown    Metastatic melanoma [C43.9]  Unknown    Adrenal insufficiency [E27.40]  Unknown    DM (diabetes mellitus), type 2 [E11.9]  Unknown      Resolved Hospital Problems   No resolved problems to display.       Assessment & Plan    Hypotension/shock: Etiology not entirely clear.  He is now afebrile and has been off of antibiotics for the past 3 days.  ID has signed off.  Chronic steroid use: Continue prednisone for now.  Diabetes type 2: Blood sugars now overcontrolled.  Decrease insulin regimen today.  BURAK/CKD: Nephrology input appreciated.  Dialysis per nephrology.  Edema: Likely multifactorial including fluid overload from resuscitation, renal failure, hypoalbuminemia.  Much  as this will be handled via dialysis.  Echocardiogram with good ejection fraction.  Grade 1 diastolic dysfunction.  Defer IV fluid management to nephrology.  Stage IIIc melanoma: Followed by [facility] [place].  Anemia: Labs presently consistent with folate deficiency, anemia of chronic disease with a mild degree of iron deficiency.  Folate and iron supplements initiated.    ADHD/PTSD/anxiety: Resume home medications.      Expected Discharge Date: 10/17/2023; Expected Discharge Time:      Jeff Chavez MD  [facility]  10/17/23  12:46 EDT

## 2023-10-17 NOTE — PLAN OF CARE
Goal Outcome Evaluation:  Plan of Care Reviewed With: patient        Progress: no change  Outcome Evaluation: Pt is alert and oriented x4. Up with x1 assist. Pt is impulsive at times. Meds whole with water. AC&HS. RA. VSS. Will cont plan of care.

## 2023-10-18 LAB
ALBUMIN SERPL-MCNC: 2.5 G/DL (ref 3.5–5.2)
ANION GAP SERPL CALCULATED.3IONS-SCNC: 8.3 MMOL/L (ref 5–15)
BUN SERPL-MCNC: 25 MG/DL (ref 8–23)
BUN/CREAT SERPL: 15.2 (ref 7–25)
CALCIUM SPEC-SCNC: 8.8 MG/DL (ref 8.6–10.5)
CHLORIDE SERPL-SCNC: 106 MMOL/L (ref 98–107)
CO2 SERPL-SCNC: 22.7 MMOL/L (ref 22–29)
CREAT SERPL-MCNC: 1.65 MG/DL (ref 0.76–1.27)
DEPRECATED RDW RBC AUTO: 45.8 FL (ref 37–54)
EGFRCR SERPLBLD CKD-EPI 2021: 46.7 ML/MIN/1.73
ERYTHROCYTE [DISTWIDTH] IN BLOOD BY AUTOMATED COUNT: 15.1 % (ref 12.3–15.4)
GLUCOSE BLDC GLUCOMTR-MCNC: 100 MG/DL (ref 70–130)
GLUCOSE BLDC GLUCOMTR-MCNC: 104 MG/DL (ref 70–130)
GLUCOSE BLDC GLUCOMTR-MCNC: 95 MG/DL (ref 70–130)
GLUCOSE SERPL-MCNC: 68 MG/DL (ref 65–99)
HCT VFR BLD AUTO: 24.4 % (ref 37.5–51)
HGB BLD-MCNC: 7.8 G/DL (ref 13–17.7)
MAGNESIUM SERPL-MCNC: 1.7 MG/DL (ref 1.6–2.4)
MCH RBC QN AUTO: 27 PG (ref 26.6–33)
MCHC RBC AUTO-ENTMCNC: 32 G/DL (ref 31.5–35.7)
MCV RBC AUTO: 84.4 FL (ref 79–97)
PHOSPHATE SERPL-MCNC: 2.3 MG/DL (ref 2.5–4.5)
PLATELET # BLD AUTO: 301 10*3/MM3 (ref 140–450)
PMV BLD AUTO: 9.8 FL (ref 6–12)
POTASSIUM SERPL-SCNC: 4.2 MMOL/L (ref 3.5–5.2)
RBC # BLD AUTO: 2.89 10*6/MM3 (ref 4.14–5.8)
SODIUM SERPL-SCNC: 137 MMOL/L (ref 136–145)
URATE SERPL-MCNC: 7.8 MG/DL (ref 3.4–7)
WBC NRBC COR # BLD: 14.35 10*3/MM3 (ref 3.4–10.8)

## 2023-10-18 PROCEDURE — 82948 REAGENT STRIP/BLOOD GLUCOSE: CPT

## 2023-10-18 PROCEDURE — 83735 ASSAY OF MAGNESIUM: CPT | Performed by: INTERNAL MEDICINE

## 2023-10-18 PROCEDURE — 84550 ASSAY OF BLOOD/URIC ACID: CPT | Performed by: INTERNAL MEDICINE

## 2023-10-18 PROCEDURE — 0JPT3XZ REMOVAL OF TUNNELED VASCULAR ACCESS DEVICE FROM TRUNK SUBCUTANEOUS TISSUE AND FASCIA, PERCUTANEOUS APPROACH: ICD-10-PCS | Performed by: SURGERY

## 2023-10-18 PROCEDURE — 85027 COMPLETE CBC AUTOMATED: CPT | Performed by: HOSPITALIST

## 2023-10-18 PROCEDURE — 63710000001 PREDNISONE PER 5 MG: Performed by: HOSPITALIST

## 2023-10-18 PROCEDURE — 25010000002 HEPARIN (PORCINE) PER 1000 UNITS: Performed by: HOSPITALIST

## 2023-10-18 PROCEDURE — 80069 RENAL FUNCTION PANEL: CPT | Performed by: HOSPITALIST

## 2023-10-18 RX ORDER — TORSEMIDE 20 MG/1
40 TABLET ORAL DAILY
Status: DISCONTINUED | OUTPATIENT
Start: 2023-10-18 | End: 2023-10-20 | Stop reason: HOSPADM

## 2023-10-18 RX ORDER — LIDOCAINE HYDROCHLORIDE 10 MG/ML
5 INJECTION, SOLUTION INFILTRATION; PERINEURAL ONCE
Status: DISCONTINUED | OUTPATIENT
Start: 2023-10-18 | End: 2023-10-20 | Stop reason: HOSPADM

## 2023-10-18 RX ADMIN — HEPARIN SODIUM 5000 UNITS: 5000 INJECTION INTRAVENOUS; SUBCUTANEOUS at 08:25

## 2023-10-18 RX ADMIN — Medication 3 MG: at 00:54

## 2023-10-18 RX ADMIN — DEXTROAMPHETAMINE SACCHARATE, AMPHETAMINE ASPARTATE, DEXTROAMPHETAMINE SULFATE, AMPHETAMINE SULFATE TABLETS, 5 MG,CLL 5 MG: 1.25; 1.25; 1.25; 1.25 TABLET ORAL at 15:06

## 2023-10-18 RX ADMIN — PANTOPRAZOLE SODIUM 40 MG: 40 TABLET, DELAYED RELEASE ORAL at 17:59

## 2023-10-18 RX ADMIN — PREDNISONE 10 MG: 10 TABLET ORAL at 08:25

## 2023-10-18 RX ADMIN — PREDNISONE 10 MG: 10 TABLET ORAL at 17:59

## 2023-10-18 RX ADMIN — VENLAFAXINE HYDROCHLORIDE 75 MG: 75 TABLET ORAL at 08:25

## 2023-10-18 RX ADMIN — HEPARIN SODIUM 5000 UNITS: 5000 INJECTION INTRAVENOUS; SUBCUTANEOUS at 20:52

## 2023-10-18 RX ADMIN — Medication 10 ML: at 21:32

## 2023-10-18 RX ADMIN — TORSEMIDE 40 MG: 20 TABLET ORAL at 12:08

## 2023-10-18 RX ADMIN — PANTOPRAZOLE SODIUM 40 MG: 40 TABLET, DELAYED RELEASE ORAL at 08:25

## 2023-10-18 RX ADMIN — SENNOSIDES AND DOCUSATE SODIUM 2 TABLET: 50; 8.6 TABLET ORAL at 20:51

## 2023-10-18 RX ADMIN — DEXTROAMPHETAMINE SACCHARATE, AMPHETAMINE ASPARTATE, DEXTROAMPHETAMINE SULFATE, AMPHETAMINE SULFATE TABLETS, 5 MG,CLL 5 MG: 1.25; 1.25; 1.25; 1.25 TABLET ORAL at 08:25

## 2023-10-18 RX ADMIN — Medication 3 MG: at 20:51

## 2023-10-18 RX ADMIN — Medication 10 ML: at 09:00

## 2023-10-18 RX ADMIN — FOLIC ACID 1 MG: 1 TABLET ORAL at 08:25

## 2023-10-18 NOTE — CASE MANAGEMENT/SOCIAL WORK
Continued Stay Note  [facility]     Patient Name: Delvin Cardozo  MRN: [id number]  Today's Date: 10/18/2023    Admit Date: 10/11/2023    Plan: Assisted Living   Discharge Plan       Row Name 10/18/23 1244       Plan    Plan Assisted Living    Plan Comments Call received from pt's sister Sally [place], she said they are uncertain on discharge plans to best serve her brother she wanted referral made to [facility], and both [facility] (previoius referral made and all declined), she said pt's lease on his apartment was up and his things have been removed from his apartment, she was wondering if pt would do better discharging to an assisted living.  I read to her the most recent PT note.  I gave her the phone number to Imani with [facility], she ask when discharge is expected, I advised it was anticipated yesterday, she said she will call Imani and call me back.   Yin Win RN                   Discharge Codes    No documentation.                 Expected Discharge Date and Time       Expected Discharge Date Expected Discharge Time    Oct 19, 2023               Yin Win, DREW

## 2023-10-18 NOTE — CASE MANAGEMENT/SOCIAL WORK
Continued Stay Note  [facility]     Patient Name: Delvin Cardozo  MRN: [id number]  Today's Date: 10/18/2023    Admit Date: 10/11/2023    Plan: [facility] for short term rehab   Discharge Plan       Row Name 10/18/23 1438       Plan    Plan [facility] for short term rehab        Plan Comments Call received from Beth with [facility], she said she has been taking with pt's sister Sally and the plan is for pt to go for rehab at [facility] and she will be assisting Sally to find an assisted living facility.  I spoke with Liam at [facility] and she confirmed they can accept pt tomorrow morning.  Voice mail left for pt's sister Sally with update and to see if family plans to transport to rehab tomorrow.  Yin Win RN      Row Name 10/18/23 0324       Plan    Plan Assisted Living    Plan Comments Call received from pt's sister Sally [place], she said they are uncertain on discharge plans to best serve her brother she wanted referral made to [facility], and both [facility] (previoius referral made and all declined), she said pt's lease on his apartment was up and his things have been removed from his apartment, she was wondering if pt would do better discharging to an assisted living.  I read to her the most recent PT note.  I gave her the phone number to Imani with [facility], she ask when discharge is expected, I advised it was anticipated yesterday, she said she will call Imani and call me back. Yin Win RN                   Discharge Codes    No documentation.                 Expected Discharge Date and Time       Expected Discharge Date Expected Discharge Time    Oct 19, 2023               Yin Win, DREW

## 2023-10-18 NOTE — PAYOR COMM NOTE
"Chandler Cardozo (63 y.o. Male)      CONTINUED STAY CLINICALS:  REF# [id number]      DEPT: [phone],  [phone]    [facility]: NPI [id number] [facility]# [id number]     Date of Birth   1960    Social Security Number       Address   [address] [facility] [address]    Home Phone       MRN   [id number]       Cheondoism   Buddhism    Marital Status                               Admission Date   10/11/23    Admission Type   Emergency    Admitting Provider   Jesus Wilcox Jr., MD    Attending Provider   Jeff Chavez MD    Department, Room/Bed   [facility] [address] [place], [facility]/1       Discharge Date       Discharge Disposition       Discharge Destination                                 Attending Provider: Jeff Chavez MD    Allergies: Kiwi Extract    Isolation: None   Infection: None   Code Status: CPR    Ht: 170.2 cm (67\")   Wt: 84.6 kg (186 lb 8.2 oz)    Admission Cmt: None   Principal Problem: Septic shock [A41.9,R65.21]                   Active Insurance as of 10/11/2023       Primary Coverage       Payor Plan Insurance Group Employer/Plan Group    [facility] BY [employer] [id number]       Payor Plan Address Payor Plan Phone Number Payor Plan Fax Number Effective Dates    [address] [address]   2/1/2022 - None Entered    [facility] [id number]         Subscriber Name Subscriber Birth Date Member ID       CHANDLER CARDOZO 1960 [id number]                     Emergency Contacts        (Rel.) Home Phone Work Phone Mobile Phone    ANN CARDOZO (Brother) [phone]    ANTHONY DWYER (Sister) [phone]    Judy Garg (Sister) -- -- [phone]    Dr. Eddi Cardozo (Brother) -- -- [phone]              Vital Signs (last 2 days)       Date/Time Temp Temp src Pulse Resp BP Patient Position SpO2    10/18/23 0621 96.9 (36.1) Oral 77 16 " 172/89 Lying 96    10/18/23 0030 97.4 (36.3) Oral 75 16 170/85 Sitting 98    10/17/23 2041 98.7 (37.1) Oral 87 16 161/86 Lying 97    10/17/23 1758 97.1 (36.2) Oral 111 20 168/91 Sitting 95    10/17/23 1248 97.9 (36.6) Oral 85 16 168/91 Lying 98    10/17/23 0746 97.5 (36.4) Oral 78 18 166/79 Lying --    10/16/23 1315 97.5 (36.4) Oral 94 18 134/75 Sitting 98    10/16/23 1010 -- -- 92 -- 162/99 Sitting 99    10/16/23 0915 -- -- 97 -- 149/96 Sitting --    10/16/23 0750 98.6 (37) Oral  87 18  174/105 Lying  100    Temp src: Simultaneous filing. User may be unaware of other data. at 10/16/23 0750    Resp: Simultaneous filing. User may be unaware of other data. at 10/16/23 0750    Patient Position: Simultaneous filing. User may be unaware of other data. at 10/16/23 0750          Oxygen Therapy (last 2 days)       Date/Time SpO2 Device (Oxygen Therapy) Flow (L/min) Oxygen Concentration (%) ETCO2 (mmHg)    10/18/23 0621 96 room air -- -- --    10/18/23 0030 98 -- -- -- --    10/17/23 2041 97 room air -- -- --    10/17/23 2003 -- room air -- -- --    10/17/23 1758 95 room air -- -- --    10/17/23 1248 98 room air -- -- --    10/17/23 0806 -- room air -- -- --    10/16/23 1431 -- room air -- -- --    10/16/23 1315 98 room air -- -- --    10/16/23 1010 99 room air -- -- --    10/16/23 0855 -- room air -- -- --    10/16/23 0750 100 -- -- -- --          Intake & Output (last 2 days)         10/16 0701  10/17 0700 10/17 0701  10/18 0700 10/18 0701  10/19 0700    P.O. 250 860     Total Intake(mL/kg) 250 (3) 860 (10.2)     Urine (mL/kg/hr) 175 (0.1) 1400 (0.7)     Stool 0 0     Total Output 175 1400     Net +75 -540            Urine Unmeasured Occurrence 2 x 2 x     Stool Unmeasured Occurrence 2 x 1 x           Lines, Drains & Airways       Active LDAs       Name Placement date Placement time Site Days    Peripheral IV Anterior;Left Forearm --  --  Forearm  --    Peripheral IV 10/11/23 2200 Anterior;Distal;Right Forearm 10/11/23   2200  Forearm  6    Hemodialysis Cath Double --  --  Internal Jugular  --                  Medication Administration Report for Delvin Cardozo as of 10/18/23 0709     Legend:    Given Hold Not Given Due Canceled Entry Other Actions    Time Time (Time) Time Time-Action         Discontinued     Completed     Future     MAR Hold     Linked             Medications 10/17/23 10/18/23      acetaminophen (TYLENOL) tablet 650 mg  Dose: 650 mg  Freq: Every 4 Hours PRN Route: PO  PRN Reason: Fever  PRN Comment: fever greater than 100.4 °F or headache  Start: 10/12/23 0115   Admin Instructions:   If given for fever, use fever parameter: fever greater than 100.4 °F  Based on patient request - if ordered for moderate or severe pain, provider allows for administration of a medication prescribed for a lower pain scale.    Do not exceed 4 grams of acetaminophen in a 24 hr period. Max dose of 2gm for AST/ALT greater than 120 units/L.    If given for pain, use the following pain scale:   Mild Pain = Pain Score of 1-3, CPOT 1-2  Moderate Pain = Pain Score of 4-6, CPOT 3-4  Severe Pain = Pain Score of 7-10, CPOT 5-8        Or  acetaminophen (TYLENOL) suppository 650 mg  Dose: 650 mg  Freq: Every 6 Hours PRN Route: RE  PRN Reasons: Mild Pain,Fever  PRN Comment: temperature greater than 100.4 °F  Start: 10/12/23 0115   Admin Instructions:   If given for fever, use fever parameter: fever greater than 100.4 °F  Based on patient request - if ordered for moderate or severe pain, provider allows for administration of a medication prescribed for a lower pain scale.    Do not exceed 4 grams of acetaminophen in a 24 hr period. Max dose of 2gm for AST/ALT greater than 120 units/L.    If given for pain, use the following pain scale:   Mild Pain = Pain Score of 1-3, CPOT 1-2  Moderate Pain = Pain Score of 4-6, CPOT 3-4  Severe Pain = Pain Score of 7-10, CPOT 5-8         amphetamine-dextroamphetamine (ADDERALL) tablet 5 mg  Dose: 5 mg  Freq: 3 Times  Daily Route: PO  Start: 10/15/23 1600   Admin Instructions:         0801-Given     (1600)-Not Given     2026-Given          0900     1600     2100             sennosides-docusate (PERICOLACE) 8.6-50 MG per tablet 2 tablet  Dose: 2 tablet  Freq: 2 Times Daily Route: PO  Start: 10/12/23 0900   Admin Instructions:   HOLD MEDICATION IF PATIENT HAS HAD BOWEL MOVEMENT. Start bowel management regimen if patient has not had a bowel movement after 12 hours.    (0801)-Not Given [C]     (2026)-Not Given           0900     2100             And  polyethylene glycol (MIRALAX) packet 17 g  Dose: 17 g  Freq: Daily PRN Route: PO  PRN Reason: Constipation  PRN Comment: Use if senna-docusate is ineffective  Start: 10/12/23 0108   Admin Instructions:   Use if no bowel movement after 12 hours. Mix in 6-8 ounces of water.  Use 4-8 ounces of water, tea, or juice for each 17 gram dose.        And  bisacodyl (DULCOLAX) EC tablet 5 mg  Dose: 5 mg  Freq: Daily PRN Route: PO  PRN Reason: Constipation  PRN Comment: Use if polyethylene glycol is ineffective  Start: 10/12/23 0108   Admin Instructions:   Use if no bowel movement after 12 hours.  Swallow whole. Do not crush, split, or chew tablet.        And  bisacodyl (DULCOLAX) suppository 10 mg  Dose: 10 mg  Freq: Daily PRN Route: RE  PRN Reason: Constipation  PRN Comment: Use if bisacodyl oral is ineffective  Start: 10/12/23 0108   Admin Instructions:   Use if no bowel movement after 12 hours.  Hold for diarrhea         calcium carbonate (TUMS) chewable tablet 500 mg (200 mg elemental)  Dose: 2 tablet  Freq: 3 Times Daily PRN Route: PO  PRN Reasons: Heartburn,Indigestion  Start: 10/13/23 2207   Admin Instructions:   One tablet contains 200 mg elemental calcium.  Take with food.         clonazePAM (KlonoPIN) tablet 0.5 mg  Dose: 0.5 mg  Freq: 2 Times Daily PRN Route: PO  PRN Reason: Anxiety  Start: 10/15/23 1214   End: 10/22/23 1213   Admin Instructions:   Caution: Look alike/sound alike drug  alert. Please read the label.  Group 2 ([place]) Hazardous Drug - Reproductive Risk Only - See Handling Guide    0424-Given                dextrose (D50W) (25 g/50 mL) IV injection 25 g  Dose: 25 g  Freq: Every 15 Minutes PRN Route: IV  PRN Reason: Low Blood Sugar  PRN Comment: Blood Sugar Less Than 70  Start: 10/12/23 0109   Admin Instructions:   Blood sugar less than 70; patient has IV access - Unresponsive, NPO or Unable To Safely Swallow         dextrose (GLUTOSE) oral gel 15 g  Dose: 15 g  Freq: Every 15 Minutes PRN Route: PO  PRN Reason: Low Blood Sugar  PRN Comment: Blood sugar less than 70  Start: 10/12/23 0109   Admin Instructions:   BS<70, Patient Alert, Is not NPO, Can safely swallow.         folic acid (FOLVITE) tablet 1 mg  Dose: 1 mg  Freq: Daily Route: PO  Start: 10/16/23 0900    0801-Given            0900               glucagon (GLUCAGEN) injection 1 mg  Dose: 1 mg  Freq: Every 15 Minutes PRN Route: IM  PRN Reason: Low Blood Sugar  PRN Comment: Blood Glucose Less Than 70  Start: 10/12/23 0109   Admin Instructions:   Blood Glucose Less Than 70 - Patient Without IV Access - Unresponsive, NPO or Unable To Safely Swallow  Reconstitute powder for injection by adding 1 mL of -supplied sterile diluent or sterile water for injection to a vial containing 1 mg of the drug, to provide solutions containing 1 mg/mL. Shake vial gently to dissolve.         heparin (porcine) 5000 UNIT/ML injection 5,000 Units  Dose: 5,000 Units  Freq: Every 12 Hours Scheduled Route: SC  Indications of Use: PROPHYLAXIS OF VENOUS THROMBOEMBOLISM  Start: 10/12/23 0215    0801-Given     2026-Given           0900     2100              heparin (porcine) injection 5,000 Units  Dose: 5,000 Units  Freq: As Needed Route: IK  PRN Comment: For Dialysis Catheter Care.  Indications Comment: dialysis line care  Start: 10/12/23 0442         insulin glargine (LANTUS, SEMGLEE) injection 5 Units  Dose: 5 Units  Freq: Nightly Route: SC  Start:  10/17/23 2100   Admin Instructions:   Do not hold basal insulin without an order. Consider requesting a dose edit, if needed.        2027-Given            2100               insulin lispro (HUMALOG/ADMELOG) injection 2-7 Units  Dose: 2-7 Units  Freq: 4 Times Daily Before Meals & Nightly Route: SC  Start: 10/17/23 1130   Admin Instructions:   Correction Insulin - Low Dose - Total Insulin Dose Less Than 40 units/day (Lean, Elderly or Renal Patients)    Blood Glucose 150-199 mg/dL - 2 units  Blood Glucose 200-249 mg/dL - 3 units  Blood Glucose 250-299 mg/dL - 4 units  Blood Glucose 300-349 mg/dL - 5 units  Blood Glucose 350-400 mg/dL - 6 units  Blood Glucose Greater Than 400 mg/dL - 7 units & Call Provider     Caution: Look alike/sound alike drug alert      (1120)-Not Given     (1801)-Not Given     (2220)-Not Given          0730 1130 1730 2100            loperamide (IMODIUM) capsule 2 mg  Dose: 2 mg  Freq: 4 Times Daily PRN Route: PO  PRN Reason: Diarrhea  Start: 10/15/23 1753   Admin Instructions:   Maximum recommended 16 mg / 24 hours.        0958-Given                melatonin tablet 3 mg  Dose: 3 mg  Freq: Nightly PRN Route: PO  PRN Reason: Sleep  Start: 10/14/23 2201     0054-Given               nitroglycerin (NITROSTAT) SL tablet 0.4 mg  Dose: 0.4 mg  Freq: Every 5 Minutes PRN Route: SL  PRN Reason: Chest Pain  PRN Comment: Only if SBP Greater Than 100  Start: 10/12/23 0108   Admin Instructions:   If Pain Unrelieved After 3 Doses Notify MD  May administer up to 3 doses per episode.         ondansetron (ZOFRAN) injection 4 mg  Dose: 4 mg  Freq: Every 6 Hours PRN Route: IV  PRN Reasons: Nausea,Vomiting  Start: 10/12/23 0115   Admin Instructions:   If BOTH ondansetron (ZOFRAN) & promethazine (PHENERGAN) Ordered, Use ondansetron First & THEN promethazine IF ondansetron Ineffective.         pantoprazole (PROTONIX) EC tablet 40 mg  Dose: 40 mg  Freq: 2 Times Daily Before Meals Route: PO  Start: 10/14/23  1730   Admin Instructions:   Swallow whole; do not crush, split, or chew.    0614-Given     1840-Given           0730     1730              predniSONE (DELTASONE) tablet 10 mg  Dose: 10 mg  Freq: 2 Times Daily With Meals Route: PO  Start: 10/15/23 1800   Admin Instructions:   Take with food.    0801-Given     1840-Given           0800     1800              sodium chloride 0.9 % flush 10 mL  Dose: 10 mL  Freq: As Needed Route: IV  PRN Reason: Line Care  Start: 10/12/23 0108         sodium chloride 0.9 % flush 10 mL  Dose: 10 mL  Freq: Every 12 Hours Scheduled Route: IV  Start: 10/12/23 0215    0801-Given     2030-Given           0900     2100              sodium chloride 0.9 % flush 10 mL  Dose: 10 mL  Freq: As Needed Route: IV  PRN Reason: Line Care  Start: 10/11/23 2150         sodium chloride 0.9 % infusion 40 mL  Dose: 40 mL  Freq: As Needed Route: IV  PRN Reason: Line Care  Start: 10/12/23 0108   Admin Instructions:   Following administration of an IV intermittent medication, flush line with 40mL NS at 100mL/hr.         venlafaxine (EFFEXOR) tablet 75 mg  Dose: 75 mg  Freq: Daily Route: PO  Start: 10/15/23 1500    0801-Given            0900              Completed Medications  Medications 10/17/23 10/18/23       cefepime 2 gm IVPB in 100 ml NS (VTB)  Dose: 2,000 mg  Freq: Once Route: IV  Indications of Use: SKIN AND SOFT TISSUE INFECTION  Start: 10/11/23 2209   End: 10/11/23 2302         cosyntropin (CORTROSYN) injection 0.25 mg  Dose: 0.25 mg  Freq: Once Route: IV  Start: 10/16/23 1330   End: 10/16/23 1427   Admin Instructions:   For ACTH Stimulation Test: Draw Baseline Cortisol Level Prior to Injection. Then Draw Cortisol Level at 30 and 60 Minutes After Injection    Reconstitute 0.25mg with 1 mL NS; further dilute in NS to provide 0.25mg/5mL. Give IV over 2 minutes.  Flush line with 5 mL NS.  If no IV access may dilute with 1 mL and give IM.         cosyntropin (CORTROSYN) injection 0.25 mg  Dose: 0.25  mg  Freq: Once Route: IV  Start: 10/13/23 2000   End: 10/14/23 0434   Admin Instructions:   For ACTH Stimulation Test: Draw Baseline Cortisol Level Prior to Injection. Then Draw Cortisol Level at 30 and 60 Minutes After Injection    Reconstitute 0.25mg with 1 mL NS; further dilute in NS to provide 0.25mg/5mL. Give IV over 2 minutes.  Flush line with 5 mL NS.  If no IV access may dilute with 1 mL and give IM.         diatrizoate meglumine-sodium (GASTROGRAFIN) 66-10 % oral solution 30 mL  Dose: 30 mL  Freq: Once in Imaging Route: PO  Start: 10/15/23 0945   End: 10/15/23 1026   Admin Instructions:   Administer per local procedures per radiology exam.         folic acid 1 mg in sodium chloride 0.9 % 50 mL IVPB  Dose: 1 mg  Freq: Once Route: IV  Start: 10/15/23 1300   End: 10/15/23 1424   Admin Instructions:   Protect from light.         Hydrocortisone Sod Suc (PF) (Solu-CORTEF) injection 200 mg  Dose: 200 mg  Freq: Once Route: IV  Start: 10/12/23 0021   End: 10/12/23 0019   Admin Instructions:   Caution: Look alike/sound alike drug alert         magnesium sulfate in D5W 1g/100mL (PREMIX)  Dose: 1 g  Freq: Every 1 Hour Route: IV  Start: 10/12/23 1445   End: 10/12/23 1737         potassium chloride (K-DUR,KLOR-CON) ER tablet 40 mEq  Dose: 40 mEq  Freq: Every 2 Hours Route: PO  Start: 10/17/23 0945   End: 10/17/23 1106   Admin Instructions:   Swallow whole; do not crush, split, or chew.    0958-Given     1106-Given               potassium chloride (K-DUR,KLOR-CON) ER tablet 40 mEq  Dose: 40 mEq  Freq: Once Route: PO  Start: 10/16/23 0730   End: 10/16/23 0903   Admin Instructions:   Do not crush or chew the capsules or tablets. The drug may not work as designed if the capsule or tablet is crushed or chewed. Swallow whole.  Swallow whole; do not crush, split, or chew.         potassium chloride 10 mEq in 100 mL IVPB  Dose: 10 mEq  Freq: Once Route: IV  Start: 10/11/23 2333   End: 10/12/23 0039   Admin Instructions:    OUTPATIENT/NON-MONITORED UNITS: Potassium Chloride standard bolus infusion rate is a maximum of 10 mEq/hr on unmonitored patients    MONITORED UNITS: Potassium Chloride standard bolus infusion rate is a maximum of 20 mEq/hr on ECG monitored patients ONLY         vancomycin (VANCOCIN) 500 mg in sodium chloride 0.9 % 100 mL IVPB-VTB  Dose: 500 mg  Freq: Once Route: IV  Indications of Use: SEPSIS  Start: 10/13/23 1400   End: 10/13/23 1745   Admin Instructions:   Give after Hd finished today 10/13         vancomycin 1750 mg/500 mL 0.9% NS IVPB ([facility])  Dose: 20 mg/kg  Weight Dosing Info: 83.9 kg  Freq: Once Route: IV  Indications of Use: SEPSIS  Start: 10/11/23 2230   End: 10/12/23 0103         vancomycin 750 mg in sodium chloride 0.9 % 250 mL IVPB-VTB  Dose: 750 mg  Freq: Once Route: IV  Indications of Use: SEPSIS  Start: 10/12/23 1400   End: 10/12/23 1550        Discontinued Medications  Medications 10/17/23 10/18/23       cefepime 2 gm IVPB in 100 ml NS (VTB)  Dose: 2,000 mg  Freq: Every 24 Hours Route: IV  Indications of Use: SEPSIS  Start: 10/13/23 2303   End: 10/14/23 1326         cefepime 2 gm IVPB in 100 ml NS (VTB)  Dose: 2,000 mg  Freq: Every 12 Hours Route: IV  Indications of Use: SEPSIS  Start: 10/12/23 1015   End: 10/13/23 0859         cefepime 2 gm IVPB in 100 ml NS (VTB)  Dose: 2,000 mg  Freq: Once Route: IV  Start: 10/12/23 0215   End: 10/12/23 0122         EPINEPHrine 5 mg in 250 mL NS infusion  Rate: 5.03-75.51 mL/hr Dose: 0.02-0.3 mcg/kg/min  Weight Dosing Info: 83.9 kg  Freq: Titrated Route: IV  Start: 10/12/23 0215   End: 10/13/23 0930   Admin Instructions:   Initiate infusion at 0.02 mcg/kg/min and titrate up or down by 0.02-0.06 mcg/kg/min every 5-10 minutes to use the lowest dose possible to maintain a MAP greater than or equal to 65 mm Hg. Maximum Dose = 0.3 mcg/kg/min. Contact provider if unable to maintain MAP target at the prescribed dose or if MAP is greater than 95 mm Hg. Once MAP target  achieved, obtain vitals a minimum of every 30 minutes. Central line preferred, if unable, use large bore IV access with frequent nurse monitoring. With provider order may titrate to maximum dose of 0.5 mcg/kg/min.  Caution: Look alike/sound alike drug alert     Protect from light.    Central line preferred, if unavailable use large bore IV access with frequent nurse monitoring of IV site.         famotidine (PEPCID) injection 20 mg  Dose: 20 mg  Freq: Daily Route: IV  Start: 10/13/23 1030   End: 10/14/23 1252   Admin Instructions:   Give IV push over 2 minutes.         famotidine (PEPCID) injection 20 mg  Dose: 20 mg  Freq: Daily Route: IV  Start: 10/12/23 1400   End: 10/13/23 0933   Admin Instructions:   Give IV push over 2 minutes.         Hydrocortisone Sod Suc (PF) (Solu-CORTEF) injection 100 mg  Dose: 100 mg  Freq: Every 8 Hours Route: IV  Start: 10/12/23 0800   End: 10/13/23 0929   Admin Instructions:   Caution: Look alike/sound alike drug alert         Hydrocortisone Sod Suc (PF) (Solu-CORTEF) injection 50 mg  Dose: 50 mg  Freq: Every 8 Hours Route: IV  Start: 10/13/23 1600   End: 10/14/23 1252   Admin Instructions:   Caution: Look alike/sound alike drug alert         insulin glargine (LANTUS, SEMGLEE) injection 10 Units  Dose: 10 Units  Freq: Nightly Route: SC  Start: 10/13/23 2100   End: 10/17/23 1248   Admin Instructions:   Do not hold basal insulin without an order. Consider requesting a dose edit, if needed.             insulin regular (humuLIN R,novoLIN R) injection 4-24 Units  Dose: 4-24 Units  Freq: 4 Times Daily Before Meals & Nightly Route: SC  Start: 10/13/23 1130   End: 10/17/23 1043   Admin Instructions:   Correction Insulin - High Dose (Total Insulin Dose Greater Than 80 units/day, Insulin Resistant Patient, Patient With Infection, Steroid Treatment)    Blood Glucose 150-199 mg/dL - 4 units  Blood Glucose 200-249 mg/dL - 8 units  Blood Glucose 250-299 mg/dL - 12 units  Blood Glucose 300-349  mg/dL - 16 units  Blood Glucose 350-400 mg/dL - 20 units  Blood Glucose Greater Than 400 mg/dL - 24 units & Call Provider     Caution: Look alike/sound alike drug alert      (0706)-Not Given [C]                insulin regular (humuLIN R,novoLIN R) injection 4-24 Units  Dose: 4-24 Units  Freq: Every 4 Hours Route: SC  Start: 10/12/23 0215   End: 10/13/23 0930   Admin Instructions:   Correction Insulin - High Dose (Total Insulin Dose Greater Than 80 units/day, Insulin Resistant Patient, Patient With Infection, Steroid Treatment)    Blood Glucose 150-199 mg/dL - 4 units  Blood Glucose 200-249 mg/dL - 8 units  Blood Glucose 250-299 mg/dL - 12 units  Blood Glucose 300-349 mg/dL - 16 units  Blood Glucose 350-400 mg/dL - 20 units  Blood Glucose Greater Than 400 mg/dL - 24 units & Call Provider     Caution: Look alike/sound alike drug alert           magnesium sulfate in D5W 1g/100mL (PREMIX)  Dose: 1 g  Freq: Every 1 Hour Route: IV  Start: 10/17/23 0945   End: 10/17/23 1244    0958-New Bag     1106-New Bag     1145              norepinephrine (LEVOPHED) 8 mg in 250 mL NS infusion (premix)  Rate: 3.15-47.19 mL/hr Dose: 0.02-0.3 mcg/kg/min  Weight Dosing Info: 83.9 kg  Freq: Titrated Route: IV  Start: 10/11/23 2312   End: 10/13/23 0930   Admin Instructions:   Initiate infusion at 0.02 mcg/kg/min and titrate by 0.02 - 0.06 mcg/kg/min every 5 - 10 minutes to use the lowest dose possible to maintain a MAP greater than or equal To 65 mmHg. Maximum Dose = 0.3 mcg/kg/min. Contact provider if unable to maintain MAP target at the maximum dose or if MAP is greater than 95 mmHg. Once MAP target achieved, obtain vitals a minimum of every 30 minutes.  With provider order may titrate to maximum dose of 0.5 mcg/kg/min.  Concentration 8 mg/250 mL          Central line preferred, if unavailable use large bore IV access with frequent nurse monitoring of IV site.         Pharmacy to dose vancomycin  Freq: Continuous PRN Route: XX  PRN Reason:  "Consult  Indications of Use: SEPSIS  Start: 10/12/23 0117   End: 10/14/23 1041         predniSONE (DELTASONE) tablet 20 mg  Dose: 20 mg  Freq: 2 Times Daily With Meals Route: PO  Start: 10/14/23 1800   End: 10/15/23 1702   Admin Instructions:   Take with food.         sodium chloride 0.9 % infusion  Rate: 50 mL/hr Dose: 50 mL/hr  Freq: Continuous Route: IV  Start: 10/12/23 0215   End: 10/14/23 1257         Vancomycin Pharmacy Intermittent/Pulse Dosing  Freq: Daily Route: XX  Indications of Use: SEPSIS  Start: 10/12/23 0900   End: 10/14/23 1041   Admin Instructions:   Patient is on intermittent or pulse Vancomycin dosing. This is an informational \"Pharmacy Dosing\" note only.         vasopressin (PITRESSIN) 20 units in 100 mL NS infusion  Rate: 9 mL/hr Dose: 0.03 Units/min  Freq: Continuous Route: IV  Start: 10/12/23 0215   End: 10/13/23 0930          Lab Results (last 48 hours)       Procedure Component Value Units Date/Time    Uric Acid [[id number]]  (Abnormal) Collected: 10/18/23 0616    Specimen: Blood Updated: 10/18/23 0709     Uric Acid 7.8 mg/dL     Magnesium [[id number]]  (Normal) Collected: 10/18/23 0616    Specimen: Blood Updated: 10/18/23 0709     Magnesium 1.7 mg/dL     CBC (No Diff) [[id number]]  (Abnormal) Collected: 10/18/23 0616    Specimen: Blood Updated: 10/18/23 0651     WBC 14.35 10*3/mm3      RBC 2.89 10*6/mm3      Hemoglobin 7.8 g/dL      Hematocrit 24.4 %      MCV 84.4 fL      MCH 27.0 pg      MCHC 32.0 g/dL      RDW 15.1 %      RDW-SD 45.8 fl      MPV 9.8 fL      Platelets 301 10*3/mm3     Renal Function Panel [[id number]] Collected: 10/18/23 0616    Specimen: Blood Updated: 10/18/23 0645    POC Glucose Once [[id number]]  (Normal) Collected: 10/17/23 2043    Specimen: Blood Updated: 10/17/23 2044     Glucose 86 mg/dL     POC Glucose Once [[id number]]  (Normal) Collected: 10/17/23 1653    Specimen: Blood Updated: 10/17/23 1655     Glucose 84 mg/dL     POC Glucose Once [[id number]]  (Normal) Collected: " 10/17/23 1348    Specimen: Blood Updated: 10/17/23 1349     Glucose 130 mg/dL     POC Glucose Once [[id number]]  (Abnormal) Collected: 10/17/23 1114    Specimen: Blood Updated: 10/17/23 1115     Glucose 136 mg/dL     Cortisol [[id number]] Collected: 10/17/23 0702    Specimen: Blood Updated: 10/17/23 0800     Cortisol 1.62 mcg/dL     Narrative:      Cortisol Reference Ranges:    Cortisol 6AM - 10AM Range: 6.02-18.40 mcg/dl  Cortisol 4PM - 8PM Range: 2.68-10.50 mcg/dl      Results may be falsely increased if patient taking Biotin.      POC Glucose Once [[id number]]  (Normal) Collected: 10/17/23 0746    Specimen: Blood Updated: 10/17/23 0747     Glucose 75 mg/dL     Manual Differential [[id number]]  (Abnormal) Collected: 10/17/23 0609    Specimen: Blood Updated: 10/17/23 0743     Neutrophil % 83.7 %      Lymphocyte % 13.3 %      Monocyte % 2.0 %      Eosinophil % 1.0 %      Neutrophils Absolute 11.69 10*3/mm3      Lymphocytes Absolute 1.86 10*3/mm3      Monocytes Absolute 0.28 10*3/mm3      Eosinophils Absolute 0.14 10*3/mm3      Anisocytosis Mod/2+     Microcytes Mod/2+     Polychromasia Mod/2+     WBC Morphology Normal     Platelet Morphology Normal    Cortisol [[id number]] Collected: 10/17/23 0609    Specimen: Blood Updated: 10/17/23 0700     Cortisol 1.65 mcg/dL     Narrative:      Cortisol Reference Ranges:    Cortisol 6AM - 10AM Range: 6.02-18.40 mcg/dl  Cortisol 4PM - 8PM Range: 2.68-10.50 mcg/dl      Results may be falsely increased if patient taking Biotin.      Magnesium [[id number]]  (Abnormal) Collected: 10/17/23 0609    Specimen: Blood Updated: 10/17/23 0658     Magnesium 1.5 mg/dL     POC Glucose Once [[id number]]  (Abnormal) Collected: 10/17/23 0656    Specimen: Blood Updated: 10/17/23 0657     Glucose 63 mg/dL     Comprehensive Metabolic Panel [[id number]]  (Abnormal) Collected: 10/17/23 0609    Specimen: Blood Updated: 10/17/23 0656     Glucose 63 mg/dL      BUN 29 mg/dL      Creatinine 1.95 mg/dL      Sodium  138 mmol/L      Potassium 3.2 mmol/L      Chloride 105 mmol/L      CO2 20.9 mmol/L      Calcium 8.8 mg/dL      Total Protein 5.5 g/dL      Albumin 2.5 g/dL      ALT (SGPT) 38 U/L      AST (SGOT) 58 U/L      Alkaline Phosphatase 526 U/L      Total Bilirubin 0.5 mg/dL      Globulin 3.0 gm/dL      A/G Ratio 0.8 g/dL      BUN/Creatinine Ratio 14.9     Anion Gap 12.1 mmol/L      eGFR 38.2 mL/min/1.73     Narrative:      GFR Normal >60  Chronic Kidney Disease <60  Kidney Failure <15      Phosphorus [[id number]]  (Normal) Collected: 10/17/23 0609    Specimen: Blood Updated: 10/17/23 0656     Phosphorus 2.7 mg/dL     Uric Acid [[id number]]  (Abnormal) Collected: 10/17/23 0609    Specimen: Blood Updated: 10/17/23 0656     Uric Acid 7.9 mg/dL     Ammonia [[id number]]  (Normal) Collected: 10/17/23 0609    Specimen: Blood Updated: 10/17/23 0648     Ammonia 37 umol/L     CBC & Differential [[id number]]  (Abnormal) Collected: 10/17/23 0609    Specimen: Blood Updated: 10/17/23 0644    Narrative:      The following orders were created for panel order CBC & Differential.  Procedure                               Abnormality         Status                     ---------                               -----------         ------                     CBC Auto Differential[[id number]]        Abnormal            Final result                 Please view results for these tests on the individual orders.    CBC Auto Differential [[id number]]  (Abnormal) Collected: 10/17/23 0609    Specimen: Blood Updated: 10/17/23 0644     WBC 13.97 10*3/mm3      RBC 2.96 10*6/mm3      Hemoglobin 7.9 g/dL      Hematocrit 25.4 %      MCV 85.8 fL      MCH 26.7 pg      MCHC 31.1 g/dL      RDW 15.2 %      RDW-SD 46.8 fl      MPV 9.7 fL      Platelets 272 10*3/mm3      nRBC 0.3 /100 WBC     Reticulocytes [[id number]]  (Abnormal) Collected: 10/17/23 0609    Specimen: Blood Updated: 10/17/23 0634     Reticulocyte % 3.50 %      Reticulocyte Absolute 0.1036 10*6/mm3     Blood  Culture - Blood, Blood, Central Line [[id number]]  (Normal) Collected: 10/12/23 0251    Specimen: Blood, Central Line Updated: 10/17/23 0300     Blood Culture No growth at 5 days    Narrative:      Less than seven (7) mL's of blood was collected.  Insufficient quantity may yield false negative results.    Blood Culture - Blood, Hand, Right [[id number]]  (Normal) Collected: 10/11/23 2223    Specimen: Blood from Hand, Right Updated: 10/16/23 2245     Blood Culture No growth at 5 days    Blood Culture - Blood, Wrist, Left [[id number]]  (Normal) Collected: 10/11/23 2223    Specimen: Blood from Wrist, Left Updated: 10/16/23 2245     Blood Culture No growth at 5 days    POC Glucose Once [[id number]]  (Abnormal) Collected: 10/16/23 2053    Specimen: Blood Updated: 10/16/23 2054     Glucose 211 mg/dL     Cortisol [[id number]] Collected: 10/16/23 1555    Specimen: Blood Updated: 10/16/23 1709     Cortisol 6.59 mcg/dL     Narrative:      Cortisol Reference Ranges:    Cortisol 6AM - 10AM Range: 6.02-18.40 mcg/dl  Cortisol 4PM - 8PM Range: 2.68-10.50 mcg/dl      Results may be falsely increased if patient taking Biotin.      POC Glucose Once [[id number]]  (Normal) Collected: 10/16/23 1641    Specimen: Blood Updated: 10/16/23 1643     Glucose 126 mg/dL     Cortisol [[id number]] Collected: 10/16/23 1522    Specimen: Blood Updated: 10/16/23 1558     Cortisol 6.27 mcg/dL     Narrative:      Cortisol Reference Ranges:    Cortisol 6AM - 10AM Range: 6.02-18.40 mcg/dl  Cortisol 4PM - 8PM Range: 2.68-10.50 mcg/dl      Results may be falsely increased if patient taking Biotin.      Cortisol [[id number]] Collected: 10/16/23 1447    Specimen: Blood Updated: 10/16/23 1530     Cortisol 3.93 mcg/dL     Narrative:      Cortisol Reference Ranges:    Cortisol 6AM - 10AM Range: 6.02-18.40 mcg/dl  Cortisol 4PM - 8PM Range: 2.68-10.50 mcg/dl      Results may be falsely increased if patient taking Biotin.      Cortisol [[id number]] Collected: 10/16/23 1321     Specimen: Blood Updated: 10/16/23 1417     Cortisol 2.72 mcg/dL     Narrative:      Cortisol Reference Ranges:    Cortisol 6AM - 10AM Range: 6.02-18.40 mcg/dl  Cortisol 4PM - 8PM Range: 2.68-10.50 mcg/dl      Results may be falsely increased if patient taking Biotin.      Cortisol [[id number]] Collected: 10/16/23 1251    Specimen: Blood Updated: 10/16/23 1343     Cortisol 2.91 mcg/dL     Narrative:      Cortisol Reference Ranges:    Cortisol 6AM - 10AM Range: 6.02-18.40 mcg/dl  Cortisol 4PM - 8PM Range: 2.68-10.50 mcg/dl      Results may be falsely increased if patient taking Biotin.      POC Glucose Once [[id number]]  (Normal) Collected: 10/16/23 1102    Specimen: Blood Updated: 10/16/23 1104     Glucose 97 mg/dL           Imaging Results (Last 48 Hours)       ** No results found for the last 48 hours. **          ECG/EMG Results (last 48 hours)       Procedure Component Value Units Date/Time    SCANNED - TELEMETRY   [[id number]] Resulted: 10/11/23     Updated: 10/16/23 1413    SCANNED - TELEMETRY   [[id number]] Resulted: 10/11/23     Updated: 10/17/23 0325    SCANNED - TELEMETRY   [[id number]] Resulted: 10/11/23     Updated: 10/17/23 0456             Physician Progress Notes (last 48 hours)        Jeff Chavez MD at 10/17/23 1246           LOS: 6 days     Name: Delvin Cardozo  Age: 63 y.o.  Sex: male  :  1960  MRN: [id number]         Primary Care Physician: Provider, No Known    Subjective   Subjective  No new complaints or acute overnight events    Objective   Vital Signs  Temp:  [97.5 °F (36.4 °C)] 97.5 °F (36.4 °C)  Heart Rate:  [78-94] 78  Resp:  [18] 18  BP: (134-166)/(75-79) 166/79  Body mass index is 28.68 kg/m².    Objective:  General Appearance:  Comfortable, in no acute distress and ill-appearing (He looks older than stated age and chronically ill).    Vital signs: (most recent): Blood pressure 166/79, pulse 78, temperature 97.5 °F (36.4 °C), temperature source Oral, resp. rate 18,  "height 170.2 cm (67\"), weight 83.1 kg (183 lb 1.6 oz), SpO2 98%.    Lungs:  Normal effort and normal respiratory rate.  He is not in respiratory distress.  There are decreased breath sounds.    Heart: Normal rate.  Regular rhythm.    Abdomen: Abdomen is soft.  Bowel sounds are normal.   There is no abdominal tenderness.     Extremities: There is no dependent edema or local swelling.    Neurological: Patient is alert and oriented to person, place and time.    Skin:  Warm and dry.                Results Review:       I reviewed the patient's new clinical results.    Results from last 7 days   Lab Units 10/17/23  0609 10/16/23  0330 10/15/23  0312 10/14/23  0319 10/13/23  0433 10/12/23  0251 10/11/23  2156   WBC 10*3/mm3 13.97* 13.95* 15.68* 18.52* 21.53* 22.31* 18.69*   HEMOGLOBIN g/dL 7.9* 8.0* 7.4* 7.4* 7.6* 8.7* 8.3*   PLATELETS 10*3/mm3 272 273 271 284 295 334 379     Results from last 7 days   Lab Units 10/17/23  0609 10/16/23  0330 10/15/23  0312 10/14/23  0319 10/13/23  0433 10/12/23  0251 10/11/23  2156   SODIUM mmol/L 138 137 136 137 136 135* 135*   POTASSIUM mmol/L 3.2* 3.2* 3.5 3.5 3.3* 3.5 3.1*   CHLORIDE mmol/L 105 102 103 100 99 97* 95*   CO2 mmol/L 20.9* 22.0 21.0* 23.0 23.0 26.0 27.5   BUN mg/dL 29* 31* 29* 21 29* 13 10   CREATININE mg/dL 1.95* 2.31* 2.46* 2.32* 3.00* 2.51* 2.30*   CALCIUM mg/dL 8.8 8.8 8.7 8.6 8.3* 8.5* 8.6   GLUCOSE mg/dL 63* 141* 146* 144* 178* 127* 94     Results from last 7 days   Lab Units 10/15/23  0312   INR  1.02             Scheduled Meds:   amphetamine-dextroamphetamine, 5 mg, Oral, TID  folic acid, 1 mg, Oral, Daily  heparin (porcine), 5,000 Units, Subcutaneous, Q12H  insulin glargine, 10 Units, Subcutaneous, Nightly  insulin lispro, 2-7 Units, Subcutaneous, 4x Daily AC & at Bedtime  pantoprazole, 40 mg, Oral, BID AC  predniSONE, 10 mg, Oral, BID With Meals  senna-docusate sodium, 2 tablet, Oral, BID  sodium chloride, 10 mL, Intravenous, Q12H  venlafaxine, 75 mg, Oral, " Daily      PRN Meds:     acetaminophen **OR** acetaminophen    senna-docusate sodium **AND** polyethylene glycol **AND** bisacodyl **AND** bisacodyl    calcium carbonate    clonazePAM    dextrose    dextrose    glucagon (human recombinant)    heparin (porcine)    loperamide    melatonin    nitroglycerin    ondansetron    [COMPLETED] Insert Peripheral IV **AND** sodium chloride    sodium chloride    sodium chloride  Continuous Infusions:       Assessment & Plan   Active Hospital Problems    Diagnosis  POA    **Septic shock [A41.9, R65.21]  Yes    BURAK (acute kidney injury) [N17.9]  Unknown    Metastatic melanoma [C43.9]  Unknown    Adrenal insufficiency [E27.40]  Unknown    DM (diabetes mellitus), type 2 [E11.9]  Unknown      Resolved Hospital Problems   No resolved problems to display.       Assessment & Plan    Hypotension/shock: Etiology not entirely clear.  He is now afebrile and has been off of antibiotics for the past 3 days.  ID has signed off.  Chronic steroid use: Continue prednisone for now.  Diabetes type 2: Blood sugars now overcontrolled.  Decrease insulin regimen today.  BURAK/CKD: Nephrology input appreciated.  Dialysis per nephrology.  Edema: Likely multifactorial including fluid overload from resuscitation, renal failure, hypoalbuminemia.  Much as this will be handled via dialysis.  Echocardiogram with good ejection fraction.  Grade 1 diastolic dysfunction.  Defer IV fluid management to nephrology.  Stage IIIc melanoma: Followed by [facility].  Anemia: Labs presently consistent with folate deficiency, anemia of chronic disease with a mild degree of iron deficiency.  Folate and iron supplements initiated.    ADHD/PTSD/anxiety: Resume home medications.      Expected Discharge Date: 10/17/2023; Expected Discharge Time:      Jeff Chavez MD  [facility]  10/17/23  12:46 EDT      Electronically signed by Jeff Chavez MD at 10/17/23 9114       Bharti  Antoine Doyle MD at 10/17/23 0857              [facility] Progress Note      Patient Name: Delvin Cardozo  : 1960  MRN: [id number]  Primary Care Physician:  Provider, No Known  Date of admission: 10/11/2023    Subjective     Interval History:   Follow-up acute kidney injury    Patient is feeling better, no chest pain or shortness of air, no orthopnea or PND, no nausea or vomiting, no abdominal pain, no dysuria or gross hematuria.  Urine output in the past 24 hours was not recorded    Review of Systems:   As noted above    Objective     Vitals:   Temp:  [97.5 °F (36.4 °C)] 97.5 °F (36.4 °C)  Heart Rate:  [78-97] 78  Resp:  [18] 18  BP: (134-166)/(75-99) 166/79    Intake/Output Summary (Last 24 hours) at 10/17/2023 0857  Last data filed at 10/17/2023 0806  Gross per 24 hour   Intake 240 ml   Output 175 ml   Net 65 ml       Physical Exam:    General Appearance: alert, awake, chronically ill, no acute distress   Skin: warm and dry  HEENT: oral mucosa normal, nonicteric sclera  Neck: No JVD, he has tunneled dialysis catheter in the right IJ with exit site below the right clavicle.  Lungs: Bilateral rhonchi, breathing effort not labored  Heart: RRR, normal S1 and S2, no S3, no rub  Abdomen: soft, nontender, nondistended, normoactive bowel  : no palpable bladder  Extremities: 1+ lower extremity edema  Neuro: normal speech and mental status     Scheduled Meds:     amphetamine-dextroamphetamine, 5 mg, Oral, TID  folic acid, 1 mg, Oral, Daily  heparin (porcine), 5,000 Units, Subcutaneous, Q12H  insulin glargine, 10 Units, Subcutaneous, Nightly  insulin regular, 4-24 Units, Subcutaneous, 4x Daily AC & at Bedtime  pantoprazole, 40 mg, Oral, BID AC  predniSONE, 10 mg, Oral, BID With Meals  senna-docusate sodium, 2 tablet, Oral, BID  sodium chloride, 10 mL, Intravenous, Q12H  venlafaxine, 75 mg, Oral, Daily      IV Meds:          Results Reviewed:   I have personally reviewed the results from the  time of this admission to 10/17/2023 08:57 EDT     Results from last 7 days   Lab Units 10/17/23  0609 10/16/23  0330 10/15/23  0312   SODIUM mmol/L 138 137 136   POTASSIUM mmol/L 3.2* 3.2* 3.5   CHLORIDE mmol/L 105 102 103   CO2 mmol/L 20.9* 22.0 21.0*   BUN mg/dL 29* 31* 29*   CREATININE mg/dL 1.95* 2.31* 2.46*   CALCIUM mg/dL 8.8 8.8 8.7   BILIRUBIN mg/dL 0.5 0.4 0.4   ALK PHOS U/L 526* 499* 503*   ALT (SGPT) U/L 38 30 29   AST (SGOT) U/L 58* 44* 55*   GLUCOSE mg/dL 63* 141* 146*       Estimated Creatinine Clearance: 40.5 mL/min (A) (by C-G formula based on SCr of 1.95 mg/dL (H)).    Results from last 7 days   Lab Units 10/17/23  0609 10/16/23  0330 10/15/23  0312 10/14/23  0319 10/13/23  0433   MAGNESIUM mg/dL 1.5*  --   --  2.1 2.2   PHOSPHORUS mg/dL 2.7 3.2 3.2 3.2 5.4*       Results from last 7 days   Lab Units 10/17/23  0609 10/15/23  0312 10/13/23  0433   URIC ACID mg/dL 7.9* 7.0 8.6*       Results from last 7 days   Lab Units 10/17/23  0609 10/16/23  0330 10/15/23  0312 10/14/23  0319 10/13/23  0433   WBC 10*3/mm3 13.97* 13.95* 15.68* 18.52* 21.53*   HEMOGLOBIN g/dL 7.9* 8.0* 7.4* 7.4* 7.6*   PLATELETS 10*3/mm3 272 273 271 284 295       Results from last 7 days   Lab Units 10/15/23  0312   INR  1.02       Assessment / Plan     ASSESSMENT:  Acute kidney injury has been requiring dialysis since early September 202 and he was transferred to [facility] and he was dialyzed daily over the SNF.  The etiology of the BURAK probably ATN associated with sepsis, but also he was on immune checkpoint therapy which is known to be associated with the interstitial nephritis, creatinine today is down to 1.95 potassium 3.2 urine output is increasing, we will not dialyze today.    Septic shock resolved..  Metastatic melanoma previously on immunotherapy  Adrenal insufficiency currently on steroid  Hypomagnesemia improved, magnesium today down to 1.5    PLAN:  No dialysis today reevaluate in the next 24 hours and decide if dialysis will  "be needed  Replete potassium and magnesium  If the renal function continued to improve will consider requesting removal of the tunneled catheter in the next 24 hours.  Surveillance labs    I reviewed the chart and other providers notes, I reviewed imaging and lab data.  I discussed the case with the patient and he voiced good understanding  Copied text in this note has been reviewed and is accurate as of 10/17/23.       Thank you for involving us in the care of Delvin Cardozo.  Please feel free to call with any questions.    Antoine Hay MD  10/17/23  08:57 EDT    [facility]  [phone]    Please note that portions of this note were completed with a voice recognition program.    Electronically signed by Antoine Hay MD at 10/17/23 0819       Jeffrey Porter MD at 10/16/23 1227          DAILY PROGRESS NOTE  [facility]    Patient Identification:  Name: Delvin Cardozo  Age: 63 y.o.  Sex: male  :  1960  MRN: [id number]         Primary Care Physician: Provider, No Known      Subjective  Patient with no new or acute complaints.  Overall feeling much better and stronger.    Objective:  General Appearance:  Comfortable, well-appearing, in no acute distress and not in pain.    Vital signs: (most recent): Blood pressure 162/99, pulse 92, temperature 98.6 °F (37 °C), temperature source Oral, resp. rate 18, height 170.2 cm (67\"), weight 83.5 kg (184 lb 1.4 oz), SpO2 99%.    Lungs:  Normal effort and normal respiratory rate.  Breath sounds clear to auscultation.    Heart: Normal rate.  Regular rhythm.    Extremities: There is no dependent edema.    Neurological: Patient is alert and oriented to person, place and time.    Skin:  Warm and dry.                  Vital signs in last 24 hours:  Temp:  [97.7 °F (36.5 °C)-98.6 °F (37 °C)] 98.6 °F (37 °C)  Heart Rate:  [87-97] 92  Resp:  [18] 18  BP: (149-179)/() 162/99    Intake/Output:    Intake/Output " Summary (Last 24 hours) at 10/16/2023 1227  Last data filed at 10/16/2023 0855  Gross per 24 hour   Intake 250 ml   Output 500 ml   Net -250 ml         Results from last 7 days   Lab Units 10/16/23  0330 10/15/23  0312 10/14/23  0319 10/13/23  0433 10/12/23  0251 10/11/23  2156   WBC 10*3/mm3 13.95* 15.68* 18.52* 21.53* 22.31* 18.69*   HEMOGLOBIN g/dL 8.0* 7.4* 7.4* 7.6* 8.7* 8.3*   PLATELETS 10*3/mm3 273 271 284 295 334 379     Results from last 7 days   Lab Units 10/16/23  0330 10/15/23  0312 10/14/23  0319 10/13/23  0433 10/12/23  0251 10/11/23  2156   SODIUM mmol/L 137 136 137 136 135* 135*   POTASSIUM mmol/L 3.2* 3.5 3.5 3.3* 3.5 3.1*   CHLORIDE mmol/L 102 103 100 99 97* 95*   CO2 mmol/L 22.0 21.0* 23.0 23.0 26.0 27.5   BUN mg/dL 31* 29* 21 29* 13 10   CREATININE mg/dL 2.31* 2.46* 2.32* 3.00* 2.51* 2.30*   GLUCOSE mg/dL 141* 146* 144* 178* 127* 94   Estimated Creatinine Clearance: 34.3 mL/min (A) (by C-G formula based on SCr of 2.31 mg/dL (H)).  Results from last 7 days   Lab Units 10/16/23  0330 10/15/23  0312 10/14/23  0319 10/13/23  0433 10/12/23  0251 10/11/23  2156   CALCIUM mg/dL 8.8 8.7 8.6 8.3* 8.5* 8.6   ALBUMIN g/dL 2.7* 2.7* 2.7* 2.3* 2.7* 2.8*   MAGNESIUM mg/dL  --   --  2.1 2.2  --  1.3*   PHOSPHORUS mg/dL 3.2 3.2 3.2 5.4*  --   --      Results from last 7 days   Lab Units 10/16/23  0330 10/15/23  0312 10/14/23  0319 10/13/23  0433 10/12/23  0251 10/11/23  2156   ALBUMIN g/dL 2.7* 2.7* 2.7* 2.3* 2.7* 2.8*   BILIRUBIN mg/dL 0.4 0.4 0.4 0.4 1.1 0.9   ALK PHOS U/L 499* 503* 431* 424* 528* 547*   AST (SGOT) U/L 44* 55* 62* 103* 235* 146*   ALT (SGPT) U/L 30 29 26 28 36 25       Assessment:  Hypotension/shock: Septic shock is the primary working diagnosis..  Cultures all negative.  Antibiotics now on hold and patient doing well off antibiotics.  Infect disease input appreciated.    Chronic steroid use: Cortisol levels are high across the board on the cosyntropin stimulation study.  Obviously this is not  consistent with adrenal insufficiency.  Also inconsistent for an individual on high-dose steroids.  After Solu-Medrol and cosyntropin has had a chance to clear the system we will likely have this repeated.    Diabetes type 2: Continue sliding scale insulin.  Very good control last 24 hours.  May need to wean down insulin as steroids are weaned down..  A1c in reasonable range at 6.9.  BURAK/CKD: Nephrology input appreciated.  Dialysis per nephrology.  Edema: Likely multifactorial including fluid overload from resuscitation, renal failure, hypoalbuminemia.  Much as this will be handled via dialysis.  Echocardiogram with good ejection fraction.  Grade 1 diastolic dysfunction.  Defer IV fluid management to nephrology.  Stage IIIc melanoma: Followed by [facility].  Anemia: Labs presently consistent with folate deficiency, anemia of chronic disease with a mild degree of iron deficiency.  Folate and iron supplements initiated.    ADHD/PTSD/anxiety: Resume home medications.      Plan:  Please see above.  Overall markedly improved.  Discharge planning.  Discussed with patient.  Discussed with CCP.    Jeffrey Porter MD  10/16/2023  12:27 EDT      Electronically signed by Jeffrey Porter MD at 10/16/23 1231       Yahir Moody MD at 10/16/23 1010          ID NOTE    CC: f/u hypotension    Subj: No acute events. NO fever. Stable off of antibiotics. No new symptoms to report.     Medications:    Current Facility-Administered Medications:     acetaminophen (TYLENOL) tablet 650 mg, 650 mg, Oral, Q4H PRN **OR** acetaminophen (TYLENOL) suppository 650 mg, 650 mg, Rectal, Q6H PRN, Jesus Wilcox Jr., MD    amphetamine-dextroamphetamine (ADDERALL) tablet 5 mg, 5 mg, Oral, TID, Jeffrey Porter MD, 5 mg at 10/16/23 0903    sennosides-docusate (PERICOLACE) 8.6-50 MG per tablet 2 tablet, 2 tablet, Oral, BID, 2 tablet at 10/14/23 0847 **AND** polyethylene glycol (MIRALAX) packet 17 g, 17 g, Oral,  Daily PRN **AND** bisacodyl (DULCOLAX) EC tablet 5 mg, 5 mg, Oral, Daily PRN **AND** bisacodyl (DULCOLAX) suppository 10 mg, 10 mg, Rectal, Daily PRN, Jesus Wilcox Jr., MD    calcium carbonate (TUMS) chewable tablet 500 mg (200 mg elemental), 2 tablet, Oral, TID PRN, Doreen Kraus APRN, 2 tablet at 10/13/23 2222    clonazePAM (KlonoPIN) tablet 0.5 mg, 0.5 mg, Oral, BID PRN, Jeffrey Porter MD, 0.5 mg at 10/15/23 2204    dextrose (D50W) (25 g/50 mL) IV injection 25 g, 25 g, Intravenous, Q15 Min PRN, Jesus Wilcox Jr., MD    dextrose (GLUTOSE) oral gel 15 g, 15 g, Oral, Q15 Min PRN, Jesus Wilcox Jr., MD    folic acid (FOLVITE) tablet 1 mg, 1 mg, Oral, Daily, Jeffrey Porter MD, 1 mg at 10/16/23 0903    glucagon (GLUCAGEN) injection 1 mg, 1 mg, Intramuscular, Q15 Min PRN, Jesus Wilcox Jr., MD    heparin (porcine) 5000 UNIT/ML injection 5,000 Units, 5,000 Units, Subcutaneous, Q12H, Jesus Wilcox Jr., MD, 5,000 Units at 10/16/23 0902    heparin (porcine) injection 5,000 Units, 5,000 Units, Intracatheter, PRN, Jesus Wilcox Jr., MD, 3,200 Units at 10/13/23 1630    insulin glargine (LANTUS, SEMGLEE) injection 10 Units, 10 Units, Subcutaneous, Nightly, Jeffrey Porter MD, 10 Units at 10/15/23 2201    insulin regular (humuLIN R,novoLIN R) injection 4-24 Units, 4-24 Units, Subcutaneous, 4x Daily AC & at Bedtime, Hermann Quiroga MD, 4 Units at 10/14/23 2126    loperamide (IMODIUM) capsule 2 mg, 2 mg, Oral, 4x Daily PRN, Jeffrey Porter MD, 2 mg at 10/15/23 1819    melatonin tablet 3 mg, 3 mg, Oral, Nightly PRN, Doreen Kraus, APRN, 3 mg at 10/15/23 2203    nitroglycerin (NITROSTAT) SL tablet 0.4 mg, 0.4 mg, Sublingual, Q5 Min PRN, Jesus Wilcox Jr., MD    ondansetron (ZOFRAN) injection 4 mg, 4 mg, Intravenous, Q6H PRN, Jesus Wilcox Jr., MD    pantoprazole (PROTONIX) EC tablet 40 mg, 40 mg, Oral, BID AC, Jeffrey Porter MD, 40 mg at  "10/16/23 0607    predniSONE (DELTASONE) tablet 10 mg, 10 mg, Oral, BID With Meals, Jeffrey Porter MD, 10 mg at 10/16/23 0903    [COMPLETED] Insert Peripheral IV, , , Once **AND** sodium chloride 0.9 % flush 10 mL, 10 mL, Intravenous, PRN, Charlie Avitia III, PA    sodium chloride 0.9 % flush 10 mL, 10 mL, Intravenous, Q12H, Jesus Wilcox Jr., MD, 10 mL at 10/16/23 0904    sodium chloride 0.9 % flush 10 mL, 10 mL, Intravenous, PRN, Jesus Wilcox Jr., MD    sodium chloride 0.9 % infusion 40 mL, 40 mL, Intravenous, PRN, Jesus Wilcox Jr., MD    venlafaxine (EFFEXOR) tablet 75 mg, 75 mg, Oral, Daily, Jeffrey Porter MD, 75 mg at 10/16/23 0903      Objective   Vital Signs   Temp:  [97.7 °F (36.5 °C)-98.6 °F (37 °C)] 98.6 °F (37 °C)  Heart Rate:  [87-97] 97  Resp:  [18] 18  BP: (149-179)/() 149/96    Physical Exam:   General: awake, alert, sitting in chair  Eyes: no scleral icterus, wears eyeglasses  Cardiovascular: NR  Respiratory: normal work of breathing on room air  GI: Abdomen is not distended  :  no Fernandez catheter  Neurological: Alert and oriented x 3  Vasc: R chest HD catheter  w/ scant blood but no erythema    Labs:   CBC, CMP, and blood cultures reviewed today  Lab Results   Component Value Date    WBC 13.95 (H) 10/16/2023    HGB 8.0 (L) 10/16/2023    HCT 25.4 (L) 10/16/2023    MCV 85.2 10/16/2023     10/16/2023       Lab Results   Component Value Date    GLUCOSE 141 (H) 10/16/2023    BUN 31 (H) 10/16/2023    CREATININE 2.31 (H) 10/16/2023    BCR 13.4 10/16/2023    CO2 22.0 10/16/2023    CALCIUM 8.8 10/16/2023    ALBUMIN 2.7 (L) 10/16/2023    AST 44 (H) 10/16/2023    ALT 30 10/16/2023     Lab Results   Component Value Date    HGBA1C 6.90 (H) 10/14/2023     Microbiology:  10/11  RPP: negative  10/11 BCx: negative  10/11 UCx: negative  10/12 MRSA nares: negative  10/12 BCx: negative    Prior radiology:  CT CAP:   \"1. Pathologic left axillary, subpectoral, " "infra-axillary lymph nodes.   Left posterolateral flank cutaneous/subcutaneous mass measuring 9.5 cm.   Extensive hepatic metastatic disease and periportal and to a lesser   degree right cardiophrenic omid enlargement.   2. Small pericardial effusion. Small left pleural effusion. Left lower   lobe consolidation with atelectasis or potential mild infiltrate.   3. 14 mm splenic low-density lesion, potential metastasis. Mild   splenomegaly. Cholelithiasis.   4. Healing left anterior rib fractures. 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  Chronic steroid use  Elevated procalcitonin    He is stable. He is out of the ICU. No fever. Normal BP. His cultures are negative. CT CAP w/o any evidence of infection. He is stable off of antibiotics ~48 hours now. He is hoping for discharge and follow-up w/ the [facility] soon.     Thank you for allowing me to be involved in the care of this patient. Infectious diseases will sign off at this time, but please call me at [phone] if any further ID questions or new ID concerns.        Electronically signed by Yahir Moody MD at 10/16/23 1012       Antoine Hay MD at 10/16/23 0931              [facility] Progress Note      Patient Name: Delvin Cardozo  : 1960  MRN: [id number]  Primary Care Physician:  Provider, No Known  Date of admission: 10/11/2023    Subjective     Interval History:   Follow-up acute kidney injury    Patient is feeling better, no chest pain or shortness of air, no orthopnea or PND, no nausea or vomiting, no abdominal pain, no dysuria or gross hematuria.  Urine output in the past 24 hours was 800 cc.    Review of Systems:   As noted above    Objective     Vitals:   Temp:  [97.7 °F (36.5 °C)-98.6 °F (37 °C)] 98.6 °F (37 °C)  Heart Rate:  [87-97] " 97  Resp:  [18] 18  BP: (149-179)/() 149/96    Intake/Output Summary (Last 24 hours) at 10/16/2023 0954  Last data filed at 10/16/2023 0855  Gross per 24 hour   Intake 250 ml   Output 500 ml   Net -250 ml       Physical Exam:    General Appearance: alert, awake, chronically ill, no acute distress   Skin: warm and dry  HEENT: oral mucosa normal, nonicteric sclera  Neck: No JVD, he has tunneled dialysis catheter in the right IJ with exit site below the right clavicle.  Lungs: Bilateral rhonchi, breathing effort not labored  Heart: RRR, normal S1 and S2, no S3, no rub  Abdomen: soft, nontender, nondistended, normoactive bowel  : no palpable bladder  Extremities: 1+ lower extremity edema  Neuro: normal speech and mental status     Scheduled Meds:     amphetamine-dextroamphetamine, 5 mg, Oral, TID  folic acid, 1 mg, Oral, Daily  heparin (porcine), 5,000 Units, Subcutaneous, Q12H  insulin glargine, 10 Units, Subcutaneous, Nightly  insulin regular, 4-24 Units, Subcutaneous, 4x Daily AC & at Bedtime  pantoprazole, 40 mg, Oral, BID AC  predniSONE, 10 mg, Oral, BID With Meals  senna-docusate sodium, 2 tablet, Oral, BID  sodium chloride, 10 mL, Intravenous, Q12H  venlafaxine, 75 mg, Oral, Daily      IV Meds:          Results Reviewed:   I have personally reviewed the results from the time of this admission to 10/16/2023 09:54 EDT     Results from last 7 days   Lab Units 10/16/23  0330 10/15/23  0312 10/14/23  0319   SODIUM mmol/L 137 136 137   POTASSIUM mmol/L 3.2* 3.5 3.5   CHLORIDE mmol/L 102 103 100   CO2 mmol/L 22.0 21.0* 23.0   BUN mg/dL 31* 29* 21   CREATININE mg/dL 2.31* 2.46* 2.32*   CALCIUM mg/dL 8.8 8.7 8.6   BILIRUBIN mg/dL 0.4 0.4 0.4   ALK PHOS U/L 499* 503* 431*   ALT (SGPT) U/L 30 29 26   AST (SGOT) U/L 44* 55* 62*   GLUCOSE mg/dL 141* 146* 144*       Estimated Creatinine Clearance: 34.3 mL/min (A) (by C-G formula based on SCr of 2.31 mg/dL (H)).    Results from last 7 days   Lab Units 10/16/23  2221  10/15/23  0312 10/14/23  0319 10/13/23  0433 10/13/23  0433 10/11/23  2156   MAGNESIUM mg/dL  --   --  2.1  --  2.2 1.3*   PHOSPHORUS mg/dL 3.2 3.2 3.2   < > 5.4*  --     < > = values in this interval not displayed.       Results from last 7 days   Lab Units 10/15/23  0312 10/13/23  0433   URIC ACID mg/dL 7.0 8.6*       Results from last 7 days   Lab Units 10/16/23  0330 10/15/23  0312 10/14/23  0319 10/13/23  0433 10/12/23  0251   WBC 10*3/mm3 13.95* 15.68* 18.52* 21.53* 22.31*   HEMOGLOBIN g/dL 8.0* 7.4* 7.4* 7.6* 8.7*   PLATELETS 10*3/mm3 273 271 284 295 334       Results from last 7 days   Lab Units 10/15/23  0312   INR  1.02       Assessment / Plan     ASSESSMENT:  Acute kidney injury has been requiring dialysis since early September 202 and he was transferred to SNF and he was dialyzed daily over the SNF.  The etiology of the BURAK probably ATN associated with sepsis, but also he was on immune checkpoint therapy which is known to be associated with the interstitial nephritis, creatinine today is 2.31, potassium 3.2 urine output is increasing, we will not dialyze today.    Septic shock improved since admission, the concern if he had catheter related sepsis will follow the recommendation of ID.  Metastatic melanoma previously on immunotherapy  Adrenal insufficiency currently on steroid  Hypomagnesemia improved, last magnesium was 2.1    PLAN:  No dialysis today reevaluate in the next 24 hours and decide if dialysis will be needed  Replete potassium  Surveillance labs    I reviewed the chart and other providers notes, I reviewed imaging and lab data.  I discussed the case with the patient and he voiced good understanding  Copied text in this note has been reviewed and is accurate as of 10/16/23.       Thank you for involving us in the care of Delvin Cardozo.  Please feel free to call with any questions.    Antoine Hay MD  10/16/23  09:54 EDT    [facility]  [phone]    Please  note that portions of this note were completed with a voice recognition program.    Electronically signed by Antoine Hay MD at 10/16/23 1881          Consult Notes (last 48 hours)        Capo Vance at 10/16/23 1206        Consult Orders    1. Inpatient Spiritual Care Consult [[id number]] ordered by Jeffrey Porter MD at 10/15/23 1027                 Met with Pt bedside to provide spiritual care. We talked about the Pt and their family. Discussed hopes for the Pt and wish to be comfortable and see their kids. Prayed with Pt. Pt knows that chaplains are available at request, and I will attempt to check in tomorrow if Pt is still in Hospital.     Electronically signed by Capo Vance at 10/16/23 1178

## 2023-10-18 NOTE — PROGRESS NOTES
" LOS: 7 days     Name: Delvin Cardozo  Age: 63 y.o.  Sex: male  :  1960  MRN: [id number]         Primary Care Physician: Provider, No Known    Subjective   Subjective  No new complaints or acute overnight events    Objective   Vital Signs  Temp:  [96.9 °F (36.1 °C)-98.7 °F (37.1 °C)] 96.9 °F (36.1 °C)  Heart Rate:  [] 77  Resp:  [16-20] 16  BP: (161-172)/(85-91) 172/89  Body mass index is 29.21 kg/m².    Objective:  General Appearance:  Comfortable and in no acute distress.    Vital signs: (most recent): Blood pressure 172/89, pulse 77, temperature 96.9 °F (36.1 °C), temperature source Oral, resp. rate 16, height 170.2 cm (67\"), weight 84.6 kg (186 lb 8.2 oz), SpO2 96%.    Lungs:  Normal effort and normal respiratory rate.    Heart: Normal rate.  Regular rhythm.    Abdomen: Abdomen is soft.  Bowel sounds are normal.   There is no abdominal tenderness.     Extremities: There is no dependent edema or local swelling.    Neurological: Patient is alert and oriented to person, place and time.    Skin:  Warm and dry.                Results Review:       I reviewed the patient's new clinical results.    Results from last 7 days   Lab Units 10/18/23  0616 10/17/23  0609 10/16/23  0330 10/15/23  0312 10/14/23  0319 10/13/23  0433 10/12/23  0251   WBC 10*3/mm3 14.35* 13.97* 13.95* 15.68* 18.52* 21.53* 22.31*   HEMOGLOBIN g/dL 7.8* 7.9* 8.0* 7.4* 7.4* 7.6* 8.7*   PLATELETS 10*3/mm3 301 272 273 271 284 295 334     Results from last 7 days   Lab Units 10/18/23  0616 10/17/23  0609 10/16/23  0330 10/15/23  0312 10/14/23  0319 10/13/23  0433 10/12/23  0251   SODIUM mmol/L 137 138 137 136 137 136 135*   POTASSIUM mmol/L 4.2 3.2* 3.2* 3.5 3.5 3.3* 3.5   CHLORIDE mmol/L 106 105 102 103 100 99 97*   CO2 mmol/L 22.7 20.9* 22.0 21.0* 23.0 23.0 26.0   BUN mg/dL 25* 29* 31* 29* 21 29* 13   CREATININE mg/dL 1.65* 1.95* 2.31* 2.46* 2.32* 3.00* 2.51*   CALCIUM mg/dL 8.8 8.8 8.8 8.7 8.6 8.3* 8.5*   GLUCOSE mg/dL 68 63* 141* 146* " 144* 178* 127*     Results from last 7 days   Lab Units 10/15/23  0312   INR  1.02             Scheduled Meds:   amphetamine-dextroamphetamine, 5 mg, Oral, TID  folic acid, 1 mg, Oral, Daily  heparin (porcine), 5,000 Units, Subcutaneous, Q12H  insulin lispro, 2-7 Units, Subcutaneous, 4x Daily AC & at Bedtime  pantoprazole, 40 mg, Oral, BID AC  predniSONE, 10 mg, Oral, BID With Meals  senna-docusate sodium, 2 tablet, Oral, BID  sodium chloride, 10 mL, Intravenous, Q12H  venlafaxine, 75 mg, Oral, Daily      PRN Meds:     acetaminophen **OR** acetaminophen    senna-docusate sodium **AND** polyethylene glycol **AND** bisacodyl **AND** bisacodyl    calcium carbonate    clonazePAM    dextrose    dextrose    glucagon (human recombinant)    heparin (porcine)    loperamide    melatonin    nitroglycerin    ondansetron    [COMPLETED] Insert Peripheral IV **AND** sodium chloride    sodium chloride    sodium chloride  Continuous Infusions:       Assessment & Plan   Active Hospital Problems    Diagnosis  POA    **Septic shock [A41.9, R65.21]  Yes    BURAK (acute kidney injury) [N17.9]  Unknown    Metastatic melanoma [C43.9]  Unknown    Adrenal insufficiency [E27.40]  Unknown    DM (diabetes mellitus), type 2 [E11.9]  Unknown      Resolved Hospital Problems   No resolved problems to display.       Assessment & Plan    Hypotension/shock: Etiology not entirely clear.  He is now afebrile and has been off of antibiotics for the past 4 days.  ID has signed off.  Chronic steroid use: Continue prednisone for now and upon discharge.  Diabetes type 2: Blood sugars still overcontrolled.  Discontinue long-acting insulin.  BURAK/CKD: Renal function continues to improve.  Nephrology indicates tentative plan for removal of TDC.  Edema: Likely multifactorial including fluid overload from resuscitation, renal failure, hypoalbuminemia.  Much as this will be handled via dialysis.  Echocardiogram with good ejection fraction.  Grade 1 diastolic  dysfunction.  Defer IV fluid management to nephrology.  Stage IIIc melanoma: Followed by [facility] [place] and recommended to him he inform them that he should be getting discharged in the fairly near future so that they can establish a follow-up plan.  Anemia: Labs presently consistent with folate deficiency, anemia of chronic disease with a mild degree of iron deficiency.  Folate and iron supplements initiated.    ADHD/PTSD/anxiety: On home medications.    Disposition: SNF referrals pending    Expected Discharge Date: 10/17/2023; Expected Discharge Time:      Jeff Chavez MD  [facility]  10/18/23  09:36 EDT

## 2023-10-18 NOTE — PLAN OF CARE
Vss, RA, A&O, up with standby assist. Torsemide restarted today by nephrology. Tunneled dialysis catheter removed by dr montgomery at bedside. At time of change of shift report, pt was resting sitting up at the side of the bed, breathing even and unlabored, free from signs of distress.

## 2023-10-18 NOTE — OP NOTE
Operative Note  Location: [facility]  Date of Admission:  10/11/2023  OR Date: 10/18/2023    Pre-op Diagnosis: Acute kidney injury    Post-op Diagnosis: Same    Procedure:   1) removal of tunneled dialysis catheter    Surgeon: Jonathan Mcdonald MD    Estimated Blood Loss: None    Complications: None    Findings: Catheter removed intact    Implants: * No surgical log found *    Indications:    The patient is an 63 y.o. male seen for evaluation removal of tunneled dialysis catheter which is felt to be no longer necessary by nephrology.       Procedure:    The patient was seen at the bedside.  The catheter had only been recently placed.  The 3 sutures holding in place were cut and the area was prepped and draped with Betadine.  The cuff was not well incorporated so the catheter was easy to remove with caudad traction.  Manual pressure alone was used for hemostasis and held for several minutes.  Sterile dressing was applied to the chest wall exit site.  Patient tolerated the procedure well.      Active Hospital Problems    Diagnosis  POA    **Septic shock [A41.9, R65.21]  Yes    BURAK (acute kidney injury) [N17.9]  Unknown    Metastatic melanoma [C43.9]  Unknown    Adrenal insufficiency [E27.40]  Unknown    DM (diabetes mellitus), type 2 [E11.9]  Unknown      Resolved Hospital Problems   No resolved problems to display.      Jonathan Mcdonald MD     Date: 10/18/2023  Time: 15:30 EDT

## 2023-10-18 NOTE — PLAN OF CARE
Goal Outcome Evaluation:   Patients VSS, Fall precautions in place, Dialysis not needed day prior.  Melatonin given to help sleep.

## 2023-10-18 NOTE — SIGNIFICANT NOTE
10/18/23 1359   OTHER   Discipline physical therapy assistant   Rehab Time/Intention   Session Not Performed patient/family declined, not feeling well  (Pt UIC when PT arrived. pt declined PT treatment this PM due to feeling exhausted mentally. Pt politely asked for PT to see him tomorrow. Will f/u with pt tomorrow.)   Recommendation   PT - Next Appointment 10/19/23

## 2023-10-18 NOTE — PROGRESS NOTES
[facility] Progress Note      Patient Name: Delvin Cardozo  : 1960  MRN: [id number]  Primary Care Physician:  Provider, No Known  Date of admission: 10/11/2023    Subjective     Interval History:   Follow-up acute kidney injury    Patient is feeling better, no chest pain or shortness of air, no orthopnea or PND, no nausea or vomiting, no abdominal pain, no dysuria or gross hematuria.  Urine output in the past 24 hours was 1400 cc.  I discussed the case with the patient's brother over the phone yesterday after I saw him.  And reported that the kidney function is recovering    Review of Systems:   As noted above    Objective     Vitals:   Temp:  [96.9 °F (36.1 °C)-98.7 °F (37.1 °C)] 96.9 °F (36.1 °C)  Heart Rate:  [] 77  Resp:  [16-20] 16  BP: (161-172)/(85-91) 172/89    Intake/Output Summary (Last 24 hours) at 10/18/2023 0951  Last data filed at 10/18/2023 0607  Gross per 24 hour   Intake 620 ml   Output 1400 ml   Net -780 ml       Physical Exam:    General Appearance: alert, awake, chronically ill, no acute distress   Skin: warm and dry  HEENT: oral mucosa normal, nonicteric sclera  Neck: No JVD, he has tunneled dialysis catheter in the right IJ with exit site below the right clavicle.  Lungs: Bilateral rhonchi, breathing effort not labored  Heart: RRR, normal S1 and S2, no S3, no rub  Abdomen: soft, nontender, nondistended, normoactive bowel  : no palpable bladder  Extremities: 1+ lower extremity edema  Neuro: normal speech and mental status     Scheduled Meds:     amphetamine-dextroamphetamine, 5 mg, Oral, TID  folic acid, 1 mg, Oral, Daily  heparin (porcine), 5,000 Units, Subcutaneous, Q12H  insulin lispro, 2-7 Units, Subcutaneous, 4x Daily AC & at Bedtime  pantoprazole, 40 mg, Oral, BID AC  predniSONE, 10 mg, Oral, BID With Meals  senna-docusate sodium, 2 tablet, Oral, BID  sodium chloride, 10 mL, Intravenous, Q12H  venlafaxine, 75 mg, Oral, Daily      IV Meds:           Results Reviewed:   I have personally reviewed the results from the time of this admission to 10/18/2023 09:51 EDT     Results from last 7 days   Lab Units 10/18/23  0616 10/17/23  0609 10/16/23  0330 10/15/23  0312   SODIUM mmol/L 137 138 137 136   POTASSIUM mmol/L 4.2 3.2* 3.2* 3.5   CHLORIDE mmol/L 106 105 102 103   CO2 mmol/L 22.7 20.9* 22.0 21.0*   BUN mg/dL 25* 29* 31* 29*   CREATININE mg/dL 1.65* 1.95* 2.31* 2.46*   CALCIUM mg/dL 8.8 8.8 8.8 8.7   BILIRUBIN mg/dL  --  0.5 0.4 0.4   ALK PHOS U/L  --  526* 499* 503*   ALT (SGPT) U/L  --  38 30 29   AST (SGOT) U/L  --  58* 44* 55*   GLUCOSE mg/dL 68 63* 141* 146*       Estimated Creatinine Clearance: 48.3 mL/min (A) (by C-G formula based on SCr of 1.65 mg/dL (H)).    Results from last 7 days   Lab Units 10/18/23  0616 10/17/23  0609 10/16/23  0330 10/15/23  0312 10/14/23  0319   MAGNESIUM mg/dL 1.7 1.5*  --   --  2.1   PHOSPHORUS mg/dL 2.3* 2.7 3.2   < > 3.2    < > = values in this interval not displayed.       Results from last 7 days   Lab Units 10/18/23  0616 10/17/23  0609 10/15/23  0312 10/13/23  0433   URIC ACID mg/dL 7.8* 7.9* 7.0 8.6*       Results from last 7 days   Lab Units 10/18/23  0616 10/17/23  0609 10/16/23  0330 10/15/23  0312 10/14/23  0319   WBC 10*3/mm3 14.35* 13.97* 13.95* 15.68* 18.52*   HEMOGLOBIN g/dL 7.8* 7.9* 8.0* 7.4* 7.4*   PLATELETS 10*3/mm3 301 272 273 271 284       Results from last 7 days   Lab Units 10/15/23  0312   INR  1.02       Assessment / Plan     ASSESSMENT:  Acute kidney injury has been requiring dialysis since early September 202 and he was transferred to SNF and he was dialyzed daily over the SNF.  The etiology of the BURAK probably ATN associated with sepsis, but also he was on immune checkpoint therapy which is known to be associated with the interstitial nephritis, creatinine today is down to 1.65 potassium 4.2, urine output is increasing, the renal function is recovering  Septic shock resolved..  Metastatic  melanoma previously on immunotherapy  Adrenal insufficiency currently on steroid  Hypomagnesemia improved, magnesium today up to 1.7  Dependent edema associated with hypoalbuminemia and mild fluid excess    PLAN:  I will ask vascular surgery to remove the tunneled dialysis cath  No further indication for dialysis  Start oral diuretics torsemide 40 mg daily  Surveillance labs    I reviewed the chart and other providers notes, I reviewed imaging and lab data.  I discussed the case with the patient and he voiced good understanding  Copied text in this note has been reviewed and is accurate as of 10/18/23.       Thank you for involving us in the care of Delvin Cardozo.  Please feel free to call with any questions.    Antoine Hay MD  10/18/23  09:51 EDT    [facility]  [phone]    Please note that portions of this note were completed with a voice recognition program.

## 2023-10-19 LAB
ALBUMIN SERPL-MCNC: 2.8 G/DL (ref 3.5–5.2)
ANION GAP SERPL CALCULATED.3IONS-SCNC: 13 MMOL/L (ref 5–15)
ANISOCYTOSIS BLD QL: ABNORMAL
BUN SERPL-MCNC: 29 MG/DL (ref 8–23)
BUN/CREAT SERPL: 17.8 (ref 7–25)
CALCIUM SPEC-SCNC: 9.3 MG/DL (ref 8.6–10.5)
CHLORIDE SERPL-SCNC: 103 MMOL/L (ref 98–107)
CO2 SERPL-SCNC: 23 MMOL/L (ref 22–29)
CREAT SERPL-MCNC: 1.63 MG/DL (ref 0.76–1.27)
DEPRECATED RDW RBC AUTO: 46.8 FL (ref 37–54)
EGFRCR SERPLBLD CKD-EPI 2021: 47.3 ML/MIN/1.73
EOSINOPHIL # BLD MANUAL: 0.17 10*3/MM3 (ref 0–0.4)
EOSINOPHIL NFR BLD MANUAL: 1.1 % (ref 0.3–6.2)
ERYTHROCYTE [DISTWIDTH] IN BLOOD BY AUTOMATED COUNT: 15.4 % (ref 12.3–15.4)
GLUCOSE BLDC GLUCOMTR-MCNC: 107 MG/DL (ref 70–130)
GLUCOSE BLDC GLUCOMTR-MCNC: 130 MG/DL (ref 70–130)
GLUCOSE BLDC GLUCOMTR-MCNC: 134 MG/DL (ref 70–130)
GLUCOSE BLDC GLUCOMTR-MCNC: 149 MG/DL (ref 70–130)
GLUCOSE BLDC GLUCOMTR-MCNC: 71 MG/DL (ref 70–130)
GLUCOSE SERPL-MCNC: 78 MG/DL (ref 65–99)
HCT VFR BLD AUTO: 26.6 % (ref 37.5–51)
HGB BLD-MCNC: 8.5 G/DL (ref 13–17.7)
LYMPHOCYTES # BLD MANUAL: 2.09 10*3/MM3 (ref 0.7–3.1)
LYMPHOCYTES NFR BLD MANUAL: 1.1 % (ref 5–12)
MAGNESIUM SERPL-MCNC: 1.4 MG/DL (ref 1.6–2.4)
MCH RBC QN AUTO: 27.1 PG (ref 26.6–33)
MCHC RBC AUTO-ENTMCNC: 32 G/DL (ref 31.5–35.7)
MCV RBC AUTO: 84.7 FL (ref 79–97)
METAMYELOCYTES NFR BLD MANUAL: 5.3 % (ref 0–0)
MONOCYTES # BLD: 0.17 10*3/MM3 (ref 0.1–0.9)
MYELOCYTES NFR BLD MANUAL: 1.1 % (ref 0–0)
NEUTROPHILS # BLD AUTO: 11.78 10*3/MM3 (ref 1.7–7)
NEUTROPHILS NFR BLD MANUAL: 77.7 % (ref 42.7–76)
PHOSPHATE SERPL-MCNC: 3 MG/DL (ref 2.5–4.5)
PLAT MORPH BLD: NORMAL
PLATELET # BLD AUTO: 369 10*3/MM3 (ref 140–450)
PMV BLD AUTO: 10 FL (ref 6–12)
POTASSIUM SERPL-SCNC: 3.9 MMOL/L (ref 3.5–5.2)
RBC # BLD AUTO: 3.14 10*6/MM3 (ref 4.14–5.8)
SODIUM SERPL-SCNC: 139 MMOL/L (ref 136–145)
URATE SERPL-MCNC: 7.8 MG/DL (ref 3.4–7)
VARIANT LYMPHS NFR BLD MANUAL: 13.8 % (ref 19.6–45.3)
WBC MORPH BLD: NORMAL
WBC NRBC COR # BLD: 15.16 10*3/MM3 (ref 3.4–10.8)

## 2023-10-19 PROCEDURE — 25010000002 MAGNESIUM SULFATE IN D5W 1G/100ML (PREMIX) 1-5 GM/100ML-% SOLUTION: Performed by: INTERNAL MEDICINE

## 2023-10-19 PROCEDURE — 80069 RENAL FUNCTION PANEL: CPT | Performed by: HOSPITALIST

## 2023-10-19 PROCEDURE — 83735 ASSAY OF MAGNESIUM: CPT | Performed by: INTERNAL MEDICINE

## 2023-10-19 PROCEDURE — 85025 COMPLETE CBC W/AUTO DIFF WBC: CPT | Performed by: INTERNAL MEDICINE

## 2023-10-19 PROCEDURE — 63710000001 PREDNISONE PER 5 MG: Performed by: HOSPITALIST

## 2023-10-19 PROCEDURE — 85007 BL SMEAR W/DIFF WBC COUNT: CPT | Performed by: INTERNAL MEDICINE

## 2023-10-19 PROCEDURE — 25010000002 HEPARIN (PORCINE) PER 1000 UNITS: Performed by: HOSPITALIST

## 2023-10-19 PROCEDURE — 84550 ASSAY OF BLOOD/URIC ACID: CPT | Performed by: INTERNAL MEDICINE

## 2023-10-19 PROCEDURE — 82948 REAGENT STRIP/BLOOD GLUCOSE: CPT

## 2023-10-19 RX ORDER — MAGNESIUM SULFATE 1 G/100ML
1 INJECTION INTRAVENOUS
Status: DISPENSED | OUTPATIENT
Start: 2023-10-19 | End: 2023-10-19

## 2023-10-19 RX ORDER — CLONAZEPAM 0.5 MG/1
0.5 TABLET ORAL 2 TIMES DAILY PRN
Qty: 6 TABLET | Refills: 0 | Status: SHIPPED | OUTPATIENT
Start: 2023-10-19

## 2023-10-19 RX ORDER — DEXTROAMPHETAMINE SACCHARATE, AMPHETAMINE ASPARTATE, DEXTROAMPHETAMINE SULFATE AND AMPHETAMINE SULFATE 1.25; 1.25; 1.25; 1.25 MG/1; MG/1; MG/1; MG/1
5 TABLET ORAL 3 TIMES DAILY
Qty: 9 TABLET | Refills: 0 | Status: SHIPPED | OUTPATIENT
Start: 2023-10-19

## 2023-10-19 RX ORDER — FOLIC ACID 1 MG/1
1 TABLET ORAL DAILY
Start: 2023-10-19

## 2023-10-19 RX ADMIN — HEPARIN SODIUM 5000 UNITS: 5000 INJECTION INTRAVENOUS; SUBCUTANEOUS at 10:05

## 2023-10-19 RX ADMIN — PANTOPRAZOLE SODIUM 40 MG: 40 TABLET, DELAYED RELEASE ORAL at 17:03

## 2023-10-19 RX ADMIN — TORSEMIDE 40 MG: 20 TABLET ORAL at 10:05

## 2023-10-19 RX ADMIN — MAGNESIUM SULFATE IN DEXTROSE 1 G: 10 INJECTION, SOLUTION INTRAVENOUS at 10:11

## 2023-10-19 RX ADMIN — Medication 3 MG: at 23:42

## 2023-10-19 RX ADMIN — DEXTROAMPHETAMINE SACCHARATE, AMPHETAMINE ASPARTATE, DEXTROAMPHETAMINE SULFATE, AMPHETAMINE SULFATE TABLETS, 5 MG,CLL 5 MG: 1.25; 1.25; 1.25; 1.25 TABLET ORAL at 16:56

## 2023-10-19 RX ADMIN — Medication 10 ML: at 20:45

## 2023-10-19 RX ADMIN — PREDNISONE 10 MG: 10 TABLET ORAL at 10:06

## 2023-10-19 RX ADMIN — DEXTROAMPHETAMINE SACCHARATE, AMPHETAMINE ASPARTATE, DEXTROAMPHETAMINE SULFATE, AMPHETAMINE SULFATE TABLETS, 5 MG,CLL 5 MG: 1.25; 1.25; 1.25; 1.25 TABLET ORAL at 20:43

## 2023-10-19 RX ADMIN — HEPARIN SODIUM 5000 UNITS: 5000 INJECTION INTRAVENOUS; SUBCUTANEOUS at 20:44

## 2023-10-19 RX ADMIN — PREDNISONE 10 MG: 10 TABLET ORAL at 18:31

## 2023-10-19 RX ADMIN — VENLAFAXINE HYDROCHLORIDE 75 MG: 75 TABLET ORAL at 10:05

## 2023-10-19 RX ADMIN — Medication 10 ML: at 10:06

## 2023-10-19 RX ADMIN — DEXTROAMPHETAMINE SACCHARATE, AMPHETAMINE ASPARTATE, DEXTROAMPHETAMINE SULFATE, AMPHETAMINE SULFATE TABLETS, 5 MG,CLL 5 MG: 1.25; 1.25; 1.25; 1.25 TABLET ORAL at 10:04

## 2023-10-19 RX ADMIN — FOLIC ACID 1 MG: 1 TABLET ORAL at 10:05

## 2023-10-19 RX ADMIN — SENNOSIDES AND DOCUSATE SODIUM 2 TABLET: 50; 8.6 TABLET ORAL at 10:04

## 2023-10-19 RX ADMIN — PANTOPRAZOLE SODIUM 40 MG: 40 TABLET, DELAYED RELEASE ORAL at 10:06

## 2023-10-19 RX ADMIN — MAGNESIUM SULFATE IN DEXTROSE 1 G: 10 INJECTION, SOLUTION INTRAVENOUS at 11:32

## 2023-10-19 NOTE — PROGRESS NOTES
[facility] Progress Note      Patient Name: Delvin Cardozo  : 1960  MRN: [id number]  Primary Care Physician:  Provider, No Known  Date of admission: 10/11/2023    Subjective     Interval History:   Follow-up acute kidney injury    Patient continuing to do quite well, has excellent urine output, the tunneled dialysis catheter was removed.  No nausea or vomiting, no abdominal pain, no dysuria or gross hematuria, had minimal lower extremity edema.    Review of Systems:   As noted above    Objective     Vitals:   Temp:  [97.5 °F (36.4 °C)-99.9 °F (37.7 °C)] 98 °F (36.7 °C)  Heart Rate:  [] 73  Resp:  [16] 16  BP: (137-161)/(74-96) 161/78    Intake/Output Summary (Last 24 hours) at 10/19/2023 0834  Last data filed at 10/19/2023 0525  Gross per 24 hour   Intake 1040 ml   Output 2750 ml   Net -1710 ml       Physical Exam:    General Appearance: alert, awake, chronically ill, no acute distress   Skin: warm and dry  HEENT: oral mucosa normal, nonicteric sclera  Neck: No JVD, he has tunneled dialysis catheter in the right IJ with exit site below the right clavicle.  Lungs: Bilateral rhonchi, breathing effort not labored  Heart: RRR, normal S1 and S2, no S3, no rub  Abdomen: soft, nontender, nondistended, normoactive bowel  : no palpable bladder  Extremities: Trace lower extremity edema  Neuro: normal speech and mental status     Scheduled Meds:     amphetamine-dextroamphetamine, 5 mg, Oral, TID  folic acid, 1 mg, Oral, Daily  heparin (porcine), 5,000 Units, Subcutaneous, Q12H  insulin lispro, 2-7 Units, Subcutaneous, 4x Daily AC & at Bedtime  lidocaine, 5 mL, Injection, Once  pantoprazole, 40 mg, Oral, BID AC  predniSONE, 10 mg, Oral, BID With Meals  senna-docusate sodium, 2 tablet, Oral, BID  sodium chloride, 10 mL, Intravenous, Q12H  torsemide, 40 mg, Oral, Daily  venlafaxine, 75 mg, Oral, Daily      IV Meds:          Results Reviewed:   I have personally reviewed the results from  the time of this admission to 10/19/2023 08:34 EDT     Results from last 7 days   Lab Units 10/19/23  0603 10/18/23  0616 10/17/23  0609 10/16/23  0330 10/15/23  0312   SODIUM mmol/L 139 137 138 137 136   POTASSIUM mmol/L 3.9 4.2 3.2* 3.2* 3.5   CHLORIDE mmol/L 103 106 105 102 103   CO2 mmol/L 23.0 22.7 20.9* 22.0 21.0*   BUN mg/dL 29* 25* 29* 31* 29*   CREATININE mg/dL 1.63* 1.65* 1.95* 2.31* 2.46*   CALCIUM mg/dL 9.3 8.8 8.8 8.8 8.7   BILIRUBIN mg/dL  --   --  0.5 0.4 0.4   ALK PHOS U/L  --   --  526* 499* 503*   ALT (SGPT) U/L  --   --  38 30 29   AST (SGOT) U/L  --   --  58* 44* 55*   GLUCOSE mg/dL 78 68 63* 141* 146*       Estimated Creatinine Clearance: 49 mL/min (A) (by C-G formula based on SCr of 1.63 mg/dL (H)).    Results from last 7 days   Lab Units 10/19/23  0603 10/18/23  0616 10/17/23  0609   MAGNESIUM mg/dL 1.4* 1.7 1.5*   PHOSPHORUS mg/dL 3.0 2.3* 2.7       Results from last 7 days   Lab Units 10/19/23  0603 10/18/23  0616 10/17/23  0609 10/15/23  0312 10/13/23  0433   URIC ACID mg/dL 7.8* 7.8* 7.9* 7.0 8.6*       Results from last 7 days   Lab Units 10/19/23  0603 10/18/23  0616 10/17/23  0609 10/16/23  0330 10/15/23  0312   WBC 10*3/mm3 15.16* 14.35* 13.97* 13.95* 15.68*   HEMOGLOBIN g/dL 8.5* 7.8* 7.9* 8.0* 7.4*   PLATELETS 10*3/mm3 369 301 272 273 271       Results from last 7 days   Lab Units 10/15/23  0312   INR  1.02       Assessment / Plan     ASSESSMENT:  Acute kidney injury has been requiring dialysis since early September 202 and he was transferred to SNF and he was dialyzed daily over the SNF.  The etiology of the BURAK probably ATN associated with sepsis, but also he was on immune checkpoint therapy which is known to be associated with the interstitial nephritis, the renal function is recovering, his creatinine is 1.63 very stable, his tunneled dialysis catheter was removed.    Septic shock resolved..  Metastatic melanoma previously on immunotherapy  Adrenal insufficiency currently on  steroid  Hypomagnesemia improved, magnesium today is down to 1.4  Dependent edema associated with hypoalbuminemia and mild fluid excess    PLAN:  Replete magnesium  No further indication for dialysis  Start oral diuretics torsemide 40 mg daily  If the patient is discharged needs follow-up in our office in 1 to 2 weeks postdischarge  Surveillance labs    I reviewed the chart and other providers notes, I reviewed imaging and lab data.  I discussed the case with the patient and he voiced good understanding  Copied text in this note has been reviewed and is accurate as of 10/19/23.       Thank you for involving us in the care of Delvin Cardozo.  Please feel free to call with any questions.    Antoine Hay MD  10/19/23  08:34 EDT    [facility]  [phone]    Please note that portions of this note were completed with a voice recognition program.

## 2023-10-19 NOTE — PROGRESS NOTES
Discharge Planning Assessment  [facility]     Patient Name: Delvin Cardozo  MRN: [id number]  Today's Date: 10/19/2023    Admit Date: 10/11/2023    Plan: [facility] for short term rehab   Discharge Needs Assessment    No documentation.                  Discharge Plan       Row Name 10/19/23 1008       Plan    Plan Comments Discharge packet given to RN. Per RN he needs 4 runs of Mag. before [facility]. She states to let the family know that 13:30 would be a good time to pick the patient up for transportation to [facility], skilled bed. OTIS Mcdowell RN CCP.      Row Name 10/19/23 0953       Plan    Plan Comments Family/Yin/sister [phone] called and will pick him up and provide the transportation to [facility] today. I am to call her when I have everything ready so that she can pick him up. OTIS Mcdowell Rn, CCP.                  Continued Care and Services - Admitted Since 10/11/2023       Destination Coordination complete.      Service Provider Request Status Selected Services Address Phone Fax Patient Preferred    [facility]  Selected Skilled Nursing [address] [id number] [phone] --       Internal Comment last updated by Yin Win RN 10/18/2023 1405    Spoke with Liam, she said to resend referral               [facility] Accepted N/A [address] [facility] [address] [phone] --    [facility] Accepted N/A [address] [facility] [address] [phone] --    [facility] AND REHAB Accepted N/A [address] [id number] [phone] --    [facility] Accepted N/A [address] [facility] [id number] [phone] --    [facility] [place] INPATIENT Considering N/A [address] [facility] [address] [phone] --    [facility] Considering N/A [address] [facility] [address] [phone] --    [facility] AND REHAB Pending - No Request Sent N/A [address] [facility] [place] [address] [phone] -- --    [facility] Declined  Patient Insurance Not Accepted N/A [address] [facility] [address] [phone] --       Internal Comment last updated by Agustina Farris, CSW 10/12/2023 1419    HD               [facility] Declined  Patient Insurance Not Accepted N/A [address] [facility] [id number] [phone] --       Internal Comment last updated by Agustina Farris, TERE 10/12/2023 1425    HD               [facility] Declined  Bed not available N/A [address] [facility] [id number] [phone] --    [facility] Declined  Bed not available N/A [address] [facility] [address] [phone] --    [facility] Declined  Patient Insurance Not Accepted N/A [address] [facility] [address] [phone] --    [facility] Declined  No Medicaid Bed Available N/A [address] [facility] [address] [phone] --    [facility] Declined  Cannot meet patient's needs N/A [address] [facility] [address] [phone] --    [facility] Declined  Out of network N/A [address] [facility] [address] [phone] --    [facility] Declined  Out of network N/A [address] [facility] [address] [phone] --                  Expected Discharge Date and Time       Expected Discharge Date Expected Discharge Time    Oct 19, 2023            Demographic Summary    No documentation.                  Functional Status    No documentation.                  Psychosocial    No documentation.                   Abuse/Neglect    No documentation.                  Legal    No documentation.                  Substance Abuse    No documentation.                  Patient Forms    No documentation.                     Precious Mcdowell RN

## 2023-10-19 NOTE — PROGRESS NOTES
"Nutrition Services    Patient Name:  Delvin Cardozo  YOB: 1960  MRN: [id number]  Admit Date:  10/11/2023  Assessment Date:  10/19/23    Nutrition follow up completed. Visited pt at bedside. Endorses good appetite. Eating 100% PO. Dislikes the Novasource Renal, will discontinue. Encouraged adequate protein intake. Pt endorses his sister is going to bring protein shakes from home. No further need for dialysis and starting oral diuretics per nephrology. Long-acting insulin discontinued per MD. Labs reviewed. BUN 29, Creat 1.63, Mg 1.4.     REC:   Discontinue Novasource Renal   Will continue to encourage and monitor PO intake   Monitor and replace electrolytes PRN     RD to follow per protocol.     Encounter Information         Current Summary Follow up protocol      Current Nutrition Orders & Evaluation of Intake       Oral Nutrition     Current PO Diet Diet: Diabetic Diets; Consistent Carbohydrate; Texture: Regular Texture (IDDSI 7); Fluid Consistency: Thin (IDDSI 0)   Supplement Novasource Renal  TID    PO Evaluation     PO Intake % 100%    Factors Affecting Intake  No factors at this time   --  Anthropometrics          Height    Weight Height: 170.2 cm (67\")  Weight: 85.2 kg (187 lb 13.3 oz) (10/19/23 0525)    BMI kg/m2 Body mass index is 29.42 kg/m².  Overweight (25 - 29.9)    Weight trend      Labs        Pertinent Labs Reviewed, listed below     Results from last 7 days   Lab Units 10/19/23  0603 10/18/23  0616 10/17/23  0609 10/16/23  0330 10/15/23  0312   SODIUM mmol/L 139 137 138 137 136   POTASSIUM mmol/L 3.9 4.2 3.2* 3.2* 3.5   CHLORIDE mmol/L 103 106 105 102 103   CO2 mmol/L 23.0 22.7 20.9* 22.0 21.0*   BUN mg/dL 29* 25* 29* 31* 29*   CREATININE mg/dL 1.63* 1.65* 1.95* 2.31* 2.46*   CALCIUM mg/dL 9.3 8.8 8.8 8.8 8.7   BILIRUBIN mg/dL  --   --  0.5 0.4 0.4   ALK PHOS U/L  --   --  526* 499* 503*   ALT (SGPT) U/L  --   --  38 30 29   AST (SGOT) U/L  --   --  58* 44* 55*   GLUCOSE mg/dL 78 68 63* " 141* 146*       Results from last 7 days   Lab Units 10/19/23  0603 10/18/23  0616 10/17/23  0609   MAGNESIUM mg/dL 1.4* 1.7 1.5*   PHOSPHORUS mg/dL 3.0 2.3* 2.7   HEMOGLOBIN g/dL 8.5* 7.8* 7.9*   HEMATOCRIT % 26.6* 24.4* 25.4*   WBC 10*3/mm3 15.16* 14.35* 13.97*   ALBUMIN g/dL 2.8* 2.5* 2.5*     Results from last 7 days   Lab Units 10/19/23  0603 10/18/23  0616 10/17/23  0609 10/16/23  0330 10/15/23  0312   INR   --   --   --   --  1.02   PLATELETS 10*3/mm3 369 301 272 273 271     COVID19   Date Value Ref Range Status   10/11/2023 Not Detected Not Detected - Ref. Range Final     Lab Results   Component Value Date    HGBA1C 6.90 (H) 10/14/2023          Medications            Scheduled Medications amphetamine-dextroamphetamine, 5 mg, Oral, TID  folic acid, 1 mg, Oral, Daily  heparin (porcine), 5,000 Units, Subcutaneous, Q12H  insulin lispro, 2-7 Units, Subcutaneous, 4x Daily AC & at Bedtime  lidocaine, 5 mL, Injection, Once  magnesium sulfate, 1 g, Intravenous, Q1H  pantoprazole, 40 mg, Oral, BID AC  predniSONE, 10 mg, Oral, BID With Meals  senna-docusate sodium, 2 tablet, Oral, BID  sodium chloride, 10 mL, Intravenous, Q12H  torsemide, 40 mg, Oral, Daily  venlafaxine, 75 mg, Oral, Daily        Infusions      PRN Medications   acetaminophen **OR** acetaminophen    senna-docusate sodium **AND** polyethylene glycol **AND** bisacodyl **AND** bisacodyl    calcium carbonate    clonazePAM    dextrose    dextrose    glucagon (human recombinant)    heparin (porcine)    loperamide    melatonin    nitroglycerin    ondansetron    [COMPLETED] Insert Peripheral IV **AND** sodium chloride    sodium chloride    sodium chloride     Physical Findings          General Appearance alert, oriented, overweight   Oral/Mouth Cavity WNL   Edema  lower extremity , 2+ (mild), 3+ (moderate)   Gastrointestinal diarrhea, non-distended , last bowel movement: 10/17   Skin  bruising, other:    left flank, left anterior thigh abrasion, right distal  calf abrasion, left second toe arterial ulcer necrotic    Tubes/Drains/Lines none   NFPE Not indicated at this time   --  NUTRITION INTERVENTION / PLAN OF CARE  Intervention Goal         Intervention Goal(s) Reduce/improve symptoms, Disease management/therapy, Maintain intake, and No significant weight loss     Nutrition Intervention         RD Action Continue to monitor and Care plan reviewed     Nutrition Prescription         Diet Prescription     Supplement Prescription Novasource Renal  TID    EN/PN Prescription    New Prescription Ordered? Continue same per protocol, No changes at this time   --  Monitor/Evaluation        Monitor Per protocol   Discharge Needs Pending clinical course     RD to follow up per protocol.    Electronically signed by:  Erica Sanchez Dietitian Intern   10/19/23 09:24 EDT

## 2023-10-19 NOTE — PROGRESS NOTES
Discharge Planning Assessment  [facility]     Patient Name: Delvin Cardozo  MRN: [id number]  Today's Date: 10/19/2023    Admit Date: 10/11/2023    [facility]   Discharge Needs Assessment    No documentation.                  Discharge Plan       Row Name 10/19/23 0953       Plan    Plan Comments Family/Yin/sister [phone] called and will pick him up and provide the transportation to [facility] today. I am to call her when I have everything ready so that she can pick him up. OTIS Mcdowell, Rn, CCP.                  Continued Care and Services - Admitted Since 10/11/2023       Destination Coordination complete.      Service Provider Request Status Selected Services Address Phone Fax Patient Preferred    [facility]  Selected Skilled Nursing [address] [id number] [phone] --       Internal Comment last updated by Yin Win RN 10/18/2023 4805    Spoke with Liam, she said to resend referral               [facility] Accepted N/A [address] [facility] [address] [phone] --    [facility] Accepted N/A [address] [facility] [address] [phone] --    [facility] [address] [id number] [phone] --    [facility], [facility] Accepted N/A [address] [facility] [id number] [phone] --    [facility] [place] IN[facility] Considering N/A [address] [facility] [address] [phone] --    [facility] REHABILITATION Considering N/A [address] [facility] [address] [phone] --    [facility] AND REHAB Pending - No Request Sent N/A [address] [facility] [address] [phone] -- --    [facility] Declined  Patient Insurance Not Accepted N/A [address]  [facility] [address], [facility] [id number] [phone] --       Internal Comment last updated by Agustina aFrris CSW 10/12/2023 1419    HD               [facility] Declined  Patient Insurance Not Accepted N/A [address] [facility] [address] [phone] --       Internal Comment last updated by Agustina Farris CSW 10/12/2023 1425    HD               [facility] Declined  Bed not available N/A [address] [facility] [address] [phone] --    [facility] Declined  Bed not available N/A [address] [facility] [address] [phone] --    [facility] Declined  Patient Insurance Not Accepted N/A [address] [facility] [address] [phone] --    [facility] Declined  No Medicaid Bed Available N/A [address] [facility] [address] [phone] --    [facility] Declined  Cannot meet patient's needs N/A [address] [facility] [address] [phone] --    [facility] Declined  Out of network N/A [address] [facility] [address] [phone] --    [facility] Declined  Out of network N/A [address] [facility] [address] [phone] --                  Expected Discharge Date and Time       Expected Discharge Date Expected Discharge Time    Oct 19, 2023            Demographic Summary    No documentation.                  Functional Status    No documentation.                  Psychosocial    No documentation.                  Abuse/Neglect    No documentation.                  Legal    No documentation.                  Substance Abuse    No documentation.                  Patient Forms    No documentation.                     Precious Mcdowell RN

## 2023-10-19 NOTE — PLAN OF CARE
Goal Outcome Evaluation:           Progress: no change  Outcome Evaluation: Pt is A&O x4. Pt takes pills whole. Pt had a low magnesium this morning and IV mag replacement was ordered. After one dose was administered, pt complained of severe fatigue and feeling feverish, pt also appeared flushed. Called MD and mag was stopped. Later on in the day, pt c/o feeling feverish again, and patient was diaphoretic, bed sheets were soaked. Pt also was fatigued, falling asleep multiple times during care. Family was notifed, both sisters and discharge was put off. Continue with plan of care.

## 2023-10-19 NOTE — DISCHARGE SUMMARY
Date of Admission: 10/11/2023  Date of Discharge:  10/19/2023  Primary Care Physician: Provider, No Known     Discharge Diagnosis:  Active Hospital Problems    Diagnosis  POA    **Septic shock [A41.9, R65.21]  Yes    BURAK (acute kidney injury) [N17.9]  Unknown    Metastatic melanoma [C43.9]  Unknown    Adrenal insufficiency [E27.40]  Unknown    DM (diabetes mellitus), type 2 [E11.9]  Unknown      Resolved Hospital Problems   No resolved problems to display.       DETAILS OF HOSPITAL STAY     Pertinent Test Results and Procedures Performed    Head CT:  1. No acute intracranial abnormality is identified. There is mild small   vessel disease in the periventricular white matter of the cerebral   hemispheres. There is mild diffuse cerebral atrophy. The remainder of   the head CT is normal. The etiology of the patient's confusion and   altered mental status is not established on this exam.     Chest x-ray:  1. No definite active disease is seen in the chest. Lung volumes are low   with horizontal platelike atelectasis at the lung bases. There is a   right internal jugular HemoCath in place with its tip in the superior   aspect of the right atrium.     Right upper quadrant ultrasound:  1.  Mildly enlarged diffusely heterogeneous liver with multiple   intrahepatic lesions, measuring up to 4.7 cm, as above. Additional less   defined intrahepatic lesions are suspected. Findings are concerning for   malignancy, including metastatic disease, possibly superimposed on   cirrhosis. Recommend correlation with contrast-enhanced CT or MRI and   comparison with prior imaging.   2.  Cholelithiasis. No biliary ductal dilatation.     CT scan of the chest, abdomen, and pelvis:  1. Pathologic left axillary, subpectoral, infra-axillary lymph nodes.   Left posterolateral flank cutaneous/subcutaneous mass measuring 9.5 cm.   Extensive hepatic metastatic disease and periportal and to a lesser   degree right cardiophrenic omid enlargement.    2. Small pericardial effusion. Small left pleural effusion. Left lower   lobe consolidation with atelectasis or potential mild infiltrate.   3. 14 mm splenic low-density lesion, potential metastasis. Mild   splenomegaly. Cholelithiasis.   4. Healing left anterior rib fractures.     Hospital Course  This is a 63-year-old male who was initially admitted to the intensive care unit after he presented to the emergency room with shock presumably of septic origin.  Please see intensivist H&P for full details.  He was initiated on broad-spectrum antibiotics.  Infectious disease was consulted.  No definitive source of infection was found.  He was ultimately taken off antibiotics and has remained stable/afebrile for the past 5 days.  The patient does have widely metastatic melanoma which is followed downtown at the [facility].  He had a previous hospitalization for sepsis and acute kidney injury with ATN requiring initiation of dialysis.  This was continued upon his admission.  He has shown good renal recovery and no longer requiring dialysis and his tunneled dialysis catheter was removed yesterday.  The patient showed evidence of confusion and very bizarre behavior upon presentation.  This is now greatly improved and he is very awake, alert, and conversant.  At this point he is medically stable.  Consulting services have all signed off.  He will be released to rehab today.  He will follow-up with nephrology in 1 to 2 weeks and have instructed the patient to call his oncologist office to schedule follow-up now that he is out of the hospital as his care there has been interrupted due to repeated hospitalizations recently.  He expresses understanding and agreement with this plan.    Physical Exam at Discharge:  General: No acute distress, AAOx3, chronically ill-appearing  HEENT: EOMI, PERRL  Cardiovascular: +s1 and s2, RRR  Lungs: No rhonchi or wheezing  Abdomen: soft, nontender    Consults:   Consults       Date and  Time Order Name Status Description    10/18/2023  9:58 AM Inpatient Vascular Surgery Consult Completed     10/13/2023  9:33 AM Inpatient Hospitalist Consult      10/12/2023 10:42 AM Inpatient Nephrology Consult Completed     10/12/2023  1:18 AM Inpatient Cardiology Consult Completed     10/12/2023  1:18 AM Inpatient Infectious Diseases Consult Completed               Condition on Discharge: Stable, improved    Discharge Disposition  Skilled Nursing Facility (DC - External)    Discharge Medications     Discharge Medications        New Medications        Instructions Start Date   folic acid 1 MG tablet  Commonly known as: FOLVITE   1 mg, Oral, Daily             Changes to Medications        Instructions Start Date   Torsemide 40 MG tablet  What changed:   medication strength  how much to take   40 mg, Oral, Daily             Continue These Medications        Instructions Start Date   amphetamine-dextroamphetamine 5 MG tablet  Commonly known as: ADDERALL   5 mg, Oral, 3 Times Daily      aspirin 81 MG chewable tablet   81 mg, Oral, Daily      atorvastatin 20 MG tablet  Commonly known as: LIPITOR   20 mg, Oral, Daily      clonazePAM 0.5 MG tablet  Commonly known as: KlonoPIN   0.5 mg, Oral, 2 Times Daily PRN      melatonin 5 MG tablet tablet   5 mg, Oral, Nightly PRN      omega-3 acid ethyl esters 1 g capsule  Commonly known as: LOVAZA   1 g, Oral, 2 Times Daily      One Daily Womens tablet tablet  Generic drug: multivitamin with minerals   1 tablet, Oral, Daily      pantoprazole 40 MG EC tablet  Commonly known as: PROTONIX   40 mg, Oral, Daily      predniSONE 10 MG tablet  Commonly known as: DELTASONE   10 mg, Oral, Daily      venlafaxine 75 MG tablet  Commonly known as: EFFEXOR   75 mg, Oral, Daily               Discharge Diet:   Diet Instructions       Diet: Regular/House Diet, Diabetic Diets; Consistent Carbohydrate; Thin (IDDSI 0)      Discharge Diet:  Regular/House Diet  Diabetic Diets       Diabetic Diet:  Consistent Carbohydrate    Fluid Consistency: Thin (IDDSI 0)            Activity at Discharge:   Activity Instructions       Activity as Tolerated              Follow-up Appointments  No future appointments.  Additional Instructions for the Follow-ups that You Need to Schedule       Discharge Follow-up with PCP   As directed       Currently Documented PCP:    Provider, No Known    PCP Phone Number:    [phone]     Follow Up Details: 1 week        Discharge Follow-up with Specialty: [facility]; 2 Weeks   As directed      Specialty: [facility]   Follow Up: 2 Weeks        Discharge Follow-up with Specialty: Primary oncologist at [facility] as soon as possible postdischarge   As directed      Specialty: Primary oncologist at [facility] as soon as possible postdischarge                  I have examined and discussed discharge planning with the patient today.    I wore full protective equipment throughout the patient encounter including eye protection and facemask.  Hand hygiene was performed before donning protective equipment and after removal when leaving the room.     Jeff Chavez MD  10/19/23  09:49 EDT    Time: Discharge greater than 30 min

## 2023-10-19 NOTE — PROGRESS NOTES
Discharge Planning Assessment  [facility]     Patient Name: Delvin Cardozo  MRN: [id number]  Today's Date: 10/19/2023    Admit Date: 10/11/2023    Plan: [facility] for short term rehab   Discharge Needs Assessment    No documentation.                  Discharge Plan       Row Name 10/19/23 1632       Plan    Plan Comments Spoke with Yin/sister and updated her that the discharge is cx. for today. Informed her that we will be in contact with her tomorrow after MD rounds. She states she will udpate all the family regarding this. CCP and RN discussed the case. Notified [facility] discharge cx for today. Called Liam with [facility] and updated her that the patient's discharge was cx. For today. OTIS Mcdowell RN, [facility]                  Continued Care and Services - Admitted Since 10/11/2023       Destination Coordination complete.      Service Provider Request Status Selected Services Address Phone Fax Patient Preferred    [facility]  Selected Skilled Nursing [address] [id number] [phone] --       Internal Comment last updated by Yin Win, DREW 10/18/2023 9717    Spoke with Liam, she said to resend referral               [facility] AT [facility] Accepted N/A [address] [facility] [address] [phone] --    [facility] Accepted N/A [address] [id number] [phone] --    [facility] AND REHAB Accepted N/A [address] [id number] [phone] --    [facility] REHAB, [facility] Accepted N/A [address] [facility] [id number] [phone] --    [facility] [place] [facility] Considering N/A [address] [facility] [address] [phone] --    [facility] Considering N/A [address] [id number] [phone] --    [facility] AND REHAB Pending - No Request Sent N/A [address] [facility] [address] [phone] -- --    [facility] Declined  Patient Insurance Not Accepted N/A [address], [facility] [id number] [phone] --       Internal Comment last updated by Agustina Farris CSW 10/12/2023 1419    HD               [facility] Declined  Patient Insurance Not Accepted N/A [address] [facility] [phone] --       Internal Comment last updated by Agustina Farris CSW 10/12/2023 1425    HD               [facility] Declined  [facility] [facility] [address] [phone] --    [facility] [place] Declined  Bed not available N/A [address] [facility] [address] [phone] --    [facility] Declined  Patient Insurance Not Accepted N/A [address] [facility] [address] [phone] --    [facility] Declined  No Medicaid Bed Available N/A [address] [facility] [address] [phone] --    [facility] Declined  Cannot meet patient's needs N/A [address] [facility] [address] [phone] --    [facility] Declined  Out of network N/A [address] [facility] [address] [phone] --    [facility] Declined  Out of network N/A [address] [facility] [address] [phone] --                  Expected Discharge Date and Time       Expected Discharge Date Expected Discharge Time    Oct 19, 2023            Demographic Summary    No documentation.                  Functional Status    No documentation.                  Psychosocial    No documentation.                  Abuse/Neglect    No documentation.                  Legal    No documentation.                  Substance Abuse    No  documentation.                  Patient Forms    No documentation.                     Precious Mcdowell RN

## 2023-10-19 NOTE — PLAN OF CARE
Goal Outcome Evaluation:  Plan of Care Reviewed With: patient        Progress: improving  Outcome Evaluation: Alert and oriented x4. Up with assist x1. VSS. PRN melatonin at bedtime. Will continue to monitor.

## 2023-10-20 VITALS
HEART RATE: 92 BPM | RESPIRATION RATE: 18 BRPM | WEIGHT: 175.93 LBS | TEMPERATURE: 97.3 F | OXYGEN SATURATION: 96 % | BODY MASS INDEX: 27.61 KG/M2 | DIASTOLIC BLOOD PRESSURE: 82 MMHG | HEIGHT: 67 IN | SYSTOLIC BLOOD PRESSURE: 137 MMHG

## 2023-10-20 LAB
ALBUMIN SERPL-MCNC: 3 G/DL (ref 3.5–5.2)
ANION GAP SERPL CALCULATED.3IONS-SCNC: 11 MMOL/L (ref 5–15)
BUN SERPL-MCNC: 31 MG/DL (ref 8–23)
BUN/CREAT SERPL: 18.9 (ref 7–25)
CALCIUM SPEC-SCNC: 9.5 MG/DL (ref 8.6–10.5)
CHLORIDE SERPL-SCNC: 100 MMOL/L (ref 98–107)
CO2 SERPL-SCNC: 26 MMOL/L (ref 22–29)
CREAT SERPL-MCNC: 1.64 MG/DL (ref 0.76–1.27)
DEPRECATED RDW RBC AUTO: 48.3 FL (ref 37–54)
EGFRCR SERPLBLD CKD-EPI 2021: 47 ML/MIN/1.73
ERYTHROCYTE [DISTWIDTH] IN BLOOD BY AUTOMATED COUNT: 15.5 % (ref 12.3–15.4)
GLUCOSE BLDC GLUCOMTR-MCNC: 129 MG/DL (ref 70–130)
GLUCOSE BLDC GLUCOMTR-MCNC: 137 MG/DL (ref 70–130)
GLUCOSE BLDC GLUCOMTR-MCNC: 80 MG/DL (ref 70–130)
GLUCOSE SERPL-MCNC: 79 MG/DL (ref 65–99)
HCT VFR BLD AUTO: 28.8 % (ref 37.5–51)
HGB BLD-MCNC: 9 G/DL (ref 13–17.7)
MAGNESIUM SERPL-MCNC: 1.5 MG/DL (ref 1.6–2.4)
MCH RBC QN AUTO: 26.7 PG (ref 26.6–33)
MCHC RBC AUTO-ENTMCNC: 31.3 G/DL (ref 31.5–35.7)
MCV RBC AUTO: 85.5 FL (ref 79–97)
PHOSPHATE SERPL-MCNC: 3.1 MG/DL (ref 2.5–4.5)
PLATELET # BLD AUTO: 385 10*3/MM3 (ref 140–450)
PMV BLD AUTO: 9.8 FL (ref 6–12)
POTASSIUM SERPL-SCNC: 3.4 MMOL/L (ref 3.5–5.2)
RBC # BLD AUTO: 3.37 10*6/MM3 (ref 4.14–5.8)
SODIUM SERPL-SCNC: 137 MMOL/L (ref 136–145)
URATE SERPL-MCNC: 8.5 MG/DL (ref 3.4–7)
WBC NRBC COR # BLD: 14.84 10*3/MM3 (ref 3.4–10.8)

## 2023-10-20 PROCEDURE — 84550 ASSAY OF BLOOD/URIC ACID: CPT | Performed by: INTERNAL MEDICINE

## 2023-10-20 PROCEDURE — 83735 ASSAY OF MAGNESIUM: CPT | Performed by: INTERNAL MEDICINE

## 2023-10-20 PROCEDURE — 63710000001 PREDNISONE PER 5 MG: Performed by: HOSPITALIST

## 2023-10-20 PROCEDURE — 80069 RENAL FUNCTION PANEL: CPT | Performed by: HOSPITALIST

## 2023-10-20 PROCEDURE — 85027 COMPLETE CBC AUTOMATED: CPT | Performed by: HOSPITALIST

## 2023-10-20 PROCEDURE — 82948 REAGENT STRIP/BLOOD GLUCOSE: CPT

## 2023-10-20 PROCEDURE — 25010000002 HEPARIN (PORCINE) PER 1000 UNITS: Performed by: HOSPITALIST

## 2023-10-20 RX ORDER — MAGNESIUM SULFATE 1 G/100ML
1 INJECTION INTRAVENOUS
Status: DISCONTINUED | OUTPATIENT
Start: 2023-10-20 | End: 2023-10-20

## 2023-10-20 RX ORDER — POTASSIUM CHLORIDE 750 MG/1
40 TABLET, FILM COATED, EXTENDED RELEASE ORAL ONCE
Status: COMPLETED | OUTPATIENT
Start: 2023-10-20 | End: 2023-10-20

## 2023-10-20 RX ADMIN — POTASSIUM CHLORIDE 40 MEQ: 750 TABLET, EXTENDED RELEASE ORAL at 11:15

## 2023-10-20 RX ADMIN — FOLIC ACID 1 MG: 1 TABLET ORAL at 09:03

## 2023-10-20 RX ADMIN — ANTACID TABLETS 2 TABLET: 500 TABLET, CHEWABLE ORAL at 09:03

## 2023-10-20 RX ADMIN — DEXTROAMPHETAMINE SACCHARATE, AMPHETAMINE ASPARTATE, DEXTROAMPHETAMINE SULFATE, AMPHETAMINE SULFATE TABLETS, 5 MG,CLL 5 MG: 1.25; 1.25; 1.25; 1.25 TABLET ORAL at 09:09

## 2023-10-20 RX ADMIN — HEPARIN SODIUM 5000 UNITS: 5000 INJECTION INTRAVENOUS; SUBCUTANEOUS at 09:04

## 2023-10-20 RX ADMIN — PREDNISONE 10 MG: 10 TABLET ORAL at 09:03

## 2023-10-20 RX ADMIN — VENLAFAXINE HYDROCHLORIDE 75 MG: 75 TABLET ORAL at 09:03

## 2023-10-20 RX ADMIN — PANTOPRAZOLE SODIUM 40 MG: 40 TABLET, DELAYED RELEASE ORAL at 09:03

## 2023-10-20 RX ADMIN — TORSEMIDE 40 MG: 20 TABLET ORAL at 09:03

## 2023-10-20 RX ADMIN — LOPERAMIDE HYDROCHLORIDE 2 MG: 2 CAPSULE ORAL at 11:15

## 2023-10-20 NOTE — PROGRESS NOTES
Case Management Discharge Note      Final Note: Discharged to [facility], skilled bed and family to transport. OTIS Mcdowell RN, CCP.         Selected Continued Care - Admitted Since 10/11/2023       Destination Coordination complete.      Service Provider Selected Services Address Phone Fax Patient Preferred    [facility] Skilled Nursing [address] [facility] [phone] --       Internal Comment last updated by Yin Win RN 10/18/2023 9113    Spoke with Liam, she said to resend referral                         Durable Medical Equipment    No services have been selected for the patient.                Dialysis/Infusion    No services have been selected for the patient.                Home Medical Care    No services have been selected for the patient.                Therapy    No services have been selected for the patient.                Community Resources    No services have been selected for the patient.                Community & DME    No services have been selected for the patient.                    Transportation Services  Private: Car    Final Discharge Disposition Code: 03 - skilled nursing facility (SNF)

## 2023-10-20 NOTE — PLAN OF CARE
Goal Outcome Evaluation:   Alert and oriented x4. Up to bathroom with standby assist. . VSS. PRN melatonin given at bedtime. Will continue to monitor

## 2023-10-20 NOTE — DISCHARGE SUMMARY
Date of Admission: 10/11/2023  Date of Discharge:  10/20/2023  Primary Care Physician: Provider, No Known     Discharge Diagnosis:  Active Hospital Problems    Diagnosis  POA    **Septic shock [A41.9, R65.21]  Yes    BURAK (acute kidney injury) [N17.9]  Unknown    Metastatic melanoma [C43.9]  Unknown    Adrenal insufficiency [E27.40]  Unknown    DM (diabetes mellitus), type 2 [E11.9]  Unknown      Resolved Hospital Problems   No resolved problems to display.       DETAILS OF HOSPITAL STAY     Pertinent Test Results and Procedures Performed    Head CT:  1. No acute intracranial abnormality is identified. There is mild small   vessel disease in the periventricular white matter of the cerebral   hemispheres. There is mild diffuse cerebral atrophy. The remainder of   the head CT is normal. The etiology of the patient's confusion and   altered mental status is not established on this exam.      Chest x-ray:  1. No definite active disease is seen in the chest. Lung volumes are low   with horizontal platelike atelectasis at the lung bases. There is a   right internal jugular HemoCath in place with its tip in the superior   aspect of the right atrium.      Right upper quadrant ultrasound:  1.  Mildly enlarged diffusely heterogeneous liver with multiple   intrahepatic lesions, measuring up to 4.7 cm, as above. Additional less   defined intrahepatic lesions are suspected. Findings are concerning for   malignancy, including metastatic disease, possibly superimposed on   cirrhosis. Recommend correlation with contrast-enhanced CT or MRI and   comparison with prior imaging.   2.  Cholelithiasis. No biliary ductal dilatation.      CT scan of the chest, abdomen, and pelvis:  1. Pathologic left axillary, subpectoral, infra-axillary lymph nodes.   Left posterolateral flank cutaneous/subcutaneous mass measuring 9.5 cm.   Extensive hepatic metastatic disease and periportal and to a lesser   degree right cardiophrenic omid enlargement.    2. Small pericardial effusion. Small left pleural effusion. Left lower   lobe consolidation with atelectasis or potential mild infiltrate.   3. 14 mm splenic low-density lesion, potential metastasis. Mild   splenomegaly. Cholelithiasis.   4. Healing left anterior rib fractures.      Hospital Course  This is a 63-year-old male who was initially admitted to the intensive care unit after he presented to the emergency room with shock presumably of septic origin.  Please see intensivist H&P for full details.  He was initiated on broad-spectrum antibiotics.  Infectious disease was consulted.  No definitive source of infection was found.  He was ultimately taken off antibiotics and has remained stable/afebrile for the past 5 days.  The patient does have widely metastatic melanoma which is followed downtown at the [facility].  He had a previous hospitalization for sepsis and acute kidney injury with ATN requiring initiation of dialysis.  This was continued upon his admission.  He has shown good renal recovery and no longer requiring dialysis and his tunneled dialysis catheter was removed yesterday.  The patient showed evidence of confusion and very bizarre behavior upon presentation.  This is now greatly improved and he is very awake, alert, and conversant.  At this point he is medically stable.  Consulting services have all signed off.  He will be released to rehab today.  He will follow-up with nephrology in 1 to 2 weeks and have instructed the patient to call his oncologist office to schedule follow-up now that he is out of the hospital as his care there has been interrupted due to repeated hospitalizations recently.  He expresses understanding and agreement with this plan.     Physical Exam at Discharge:  General: No acute distress, AAOx3, chronically ill-appearing  HEENT: EOMI, PERRL  Cardiovascular: +s1 and s2, RRR  Lungs: No rhonchi or wheezing  Abdomen: soft, nontender    Consults:   Consults       Date  and Time Order Name Status Description    10/18/2023  9:58 AM Inpatient Vascular Surgery Consult Completed     10/13/2023  9:33 AM Inpatient Hospitalist Consult      10/12/2023 10:42 AM Inpatient Nephrology Consult Completed     10/12/2023  1:18 AM Inpatient Cardiology Consult Completed     10/12/2023  1:18 AM Inpatient Infectious Diseases Consult Completed               Condition on Discharge: Stable, improved    Discharge Disposition  Skilled Nursing Facility (DC - External)    Discharge Medications     Discharge Medications        New Medications        Instructions Start Date   folic acid 1 MG tablet  Commonly known as: FOLVITE   1 mg, Oral, Daily             Changes to Medications        Instructions Start Date   Torsemide 40 MG tablet  What changed:   medication strength  how much to take   40 mg, Oral, Daily             Continue These Medications        Instructions Start Date   amphetamine-dextroamphetamine 5 MG tablet  Commonly known as: ADDERALL   5 mg, Oral, 3 Times Daily      aspirin 81 MG chewable tablet   81 mg, Oral, Daily      atorvastatin 20 MG tablet  Commonly known as: LIPITOR   20 mg, Oral, Daily      clonazePAM 0.5 MG tablet  Commonly known as: KlonoPIN   0.5 mg, Oral, 2 Times Daily PRN      melatonin 5 MG tablet tablet   5 mg, Oral, Nightly PRN      omega-3 acid ethyl esters 1 g capsule  Commonly known as: LOVAZA   1 g, Oral, 2 Times Daily      One Daily Womens tablet tablet  Generic drug: multivitamin with minerals   1 tablet, Oral, Daily      pantoprazole 40 MG EC tablet  Commonly known as: PROTONIX   40 mg, Oral, Daily      predniSONE 10 MG tablet  Commonly known as: DELTASONE   10 mg, Oral, Daily      venlafaxine 75 MG tablet  Commonly known as: EFFEXOR   75 mg, Oral, Daily               Discharge Diet:   Diet Instructions       Diet: Regular/House Diet, Diabetic Diets; Consistent Carbohydrate; Thin (IDDSI 0)      Discharge Diet:  Regular/House Diet  Diabetic Diets       Diabetic Diet:  Consistent Carbohydrate    Fluid Consistency: Thin (IDDSI 0)            Activity at Discharge:   Activity Instructions       Activity as Tolerated              Follow-up Appointments  No future appointments.  Additional Instructions for the Follow-ups that You Need to Schedule       Discharge Follow-up with PCP   As directed       Currently Documented PCP:    Provider, No Known    PCP Phone Number:    [phone]     Follow Up Details: 1 week        Discharge Follow-up with Specialty: [facility]; 2 Weeks   As directed      Specialty: [facility]   Follow Up: 2 Weeks        Discharge Follow-up with Specialty: Primary oncologist at [facility] as soon as possible postdischarge   As directed      Specialty: Primary oncologist at [facility] as soon as possible postdischarge                  I have examined and discussed discharge planning with the patient today.    I wore full protective equipment throughout the patient encounter including eye protection and facemask.  Hand hygiene was performed before donning protective equipment and after removal when leaving the room.     Jeff Chavez MD  10/20/23  09:27 EDT    Time: Discharge greater than 30 min

## 2023-10-20 NOTE — PROGRESS NOTES
Discharge Planning Assessment  [facility]     Patient Name: Delvin Cardozo  MRN: [id number]  Today's Date: 10/20/2023    Admit Date: 10/11/2023    Plan: [facility] for short term rehab   Discharge Needs Assessment    No documentation.                  Discharge Plan       Row Name 10/20/23 1430       Plan    Plan Comments Spoke with [facility] and they can accept today. Yin/sister will provide the transportation today at 16:00. OTIS Mcdowell RN CCP.    Final Discharge Disposition Code 03 - skilled nursing facility (SNF)    Final Note [facility] bed and family to transport. OTIS Mcdowlel RN, CCP.                  Continued Care and Services - Admitted Since 10/11/2023       Destination Coordination complete.      Service Provider Request Status Selected Services Address Phone Fax Patient Preferred    [facility]  Selected Skilled Nursing [address] [id number] [phone] --       Internal Comment last updated by Yin Win RN 10/18/2023 1405    Spoke with Liam, she said to resend referral               [facility] Accepted N/A [address] [facility] [address] [phone] --    [facility] Accepted N/A [address] [id number] [phone] --    [facility] AND REHAB Accepted N/A [address] [facility] [id number] [phone] --    [facility] & REHAB, [facility] Accepted N/A [address] [facility] [id number] [phone] --    [facility] [place] [facility] Considering N/A [address] [facility] [id number] [phone] --    [facility] AND REHAB Pending - No Request Sent N/A [address] [facility] [address] [phone] -- --    [facility] Declined  Patient Insurance Not Accepted N/A [address], [facility] [place] [id number] [phone] --       Internal Comment last updated by Agustina Farirs CSW 10/12/2023 1419    HD               [facility] Declined  Patient Insurance Not Accepted N/A [address] , [facility] [address] [phone] --       Internal Comment last updated by Agustina Farris CSW 10/12/2023 1425    HD               [facility]  Bed not available N/A [address] [facility] [address] [phone] --    [facility] [place] Declined  Bed not available N/A [address] [facility] [address] [phone] --    [facility] REHABILITATION Declined  No Medicaid Bed Available N/A [address] [facility] [address] [phone] --    [facility] Declined  Patient Insurance Not Accepted N/A [address] [facility] [address] [phone] --    [facility] Declined  No Medicaid Bed Available N/A [address] [facility] [address] [phone] --    [facility] Declined  Cannot meet patient's needs N/A [address] [facility] [address] [phone] --    [facility] Declined  Out of network N/A [address] [facility] [address] [phone] --    [facility] Declined  Out of network N/A [address] [facility] [address] [phone] --                  Expected Discharge Date and Time       Expected Discharge Date Expected Discharge Time    Oct 19, 2023            Demographic Summary    No documentation.                  Functional Status    No documentation.                  Psychosocial    No documentation.                  Abuse/Neglect    No documentation.                  Legal    No documentation.                  Substance Abuse    No documentation.                  Patient Forms    No  documentation.                     Precious Mcdowell RN

## 2023-10-20 NOTE — PROGRESS NOTES
[facility] Progress Note      Patient Name: Delvin Cardozo  : 1960  MRN: [id number]  Primary Care Physician:  Provider, No Known  Date of admission: 10/11/2023    Subjective     Interval History:   Follow-up acute kidney injury    Patient continuing to do quite well, has excellent urine output, the tunneled dialysis catheter was removed.  No nausea or vomiting, no abdominal pain, no dysuria or gross hematuria, had minimal lower extremity edema.  He had abdominal cramping yesterday.    Review of Systems:   As noted above    Objective     Vitals:   Temp:  [97.1 °F (36.2 °C)-99.8 °F (37.7 °C)] 97.7 °F (36.5 °C)  Heart Rate:  [81-99] 92  Resp:  [16] 16  BP: (139-163)/(85-88) 146/88    Intake/Output Summary (Last 24 hours) at 10/20/2023 0913  Last data filed at 10/20/2023 0538  Gross per 24 hour   Intake 1180 ml   Output 1050 ml   Net 130 ml       Physical Exam:    General Appearance: alert, awake, chronically ill, no acute distress   Extremity appears to have trace to 1+ edema    Scheduled Meds:     amphetamine-dextroamphetamine, 5 mg, Oral, TID  folic acid, 1 mg, Oral, Daily  heparin (porcine), 5,000 Units, Subcutaneous, Q12H  insulin lispro, 2-7 Units, Subcutaneous, 4x Daily AC & at Bedtime  lidocaine, 5 mL, Injection, Once  pantoprazole, 40 mg, Oral, BID AC  predniSONE, 10 mg, Oral, BID With Meals  senna-docusate sodium, 2 tablet, Oral, BID  sodium chloride, 10 mL, Intravenous, Q12H  torsemide, 40 mg, Oral, Daily  venlafaxine, 75 mg, Oral, Daily      IV Meds:          Results Reviewed:   I have personally reviewed the results from the time of this admission to 10/20/2023 09:13 EDT     Results from last 7 days   Lab Units 10/20/23  0709 10/19/23  0603 10/18/23  0616 10/17/23  0609 10/16/23  0330 10/15/23  0312   SODIUM mmol/L 137 139 137 138 137 136   POTASSIUM mmol/L 3.4* 3.9 4.2 3.2* 3.2* 3.5   CHLORIDE mmol/L 100 103 106 105 102 103   CO2 mmol/L 26.0 23.0 22.7 20.9* 22.0 21.0*    BUN mg/dL 31* 29* 25* 29* 31* 29*   CREATININE mg/dL 1.64* 1.63* 1.65* 1.95* 2.31* 2.46*   CALCIUM mg/dL 9.5 9.3 8.8 8.8 8.8 8.7   BILIRUBIN mg/dL  --   --   --  0.5 0.4 0.4   ALK PHOS U/L  --   --   --  526* 499* 503*   ALT (SGPT) U/L  --   --   --  38 30 29   AST (SGOT) U/L  --   --   --  58* 44* 55*   GLUCOSE mg/dL 79 78 68 63* 141* 146*       Estimated Creatinine Clearance: 47.3 mL/min (A) (by C-G formula based on SCr of 1.64 mg/dL (H)).    Results from last 7 days   Lab Units 10/20/23  0709 10/19/23  0603 10/18/23  0616   MAGNESIUM mg/dL 1.5* 1.4* 1.7   PHOSPHORUS mg/dL 3.1 3.0 2.3*       Results from last 7 days   Lab Units 10/20/23  0709 10/19/23  0603 10/18/23  0616 10/17/23  0609 10/15/23  0312   URIC ACID mg/dL 8.5* 7.8* 7.8* 7.9* 7.0       Results from last 7 days   Lab Units 10/20/23  0709 10/19/23  0603 10/18/23  0616 10/17/23  0609 10/16/23  0330   WBC 10*3/mm3 14.84* 15.16* 14.35* 13.97* 13.95*   HEMOGLOBIN g/dL 9.0* 8.5* 7.8* 7.9* 8.0*   PLATELETS 10*3/mm3 385 369 301 272 273       Results from last 7 days   Lab Units 10/15/23  0312   INR  1.02       Assessment / Plan     ASSESSMENT:  Acute kidney injury has been requiring dialysis since early September 202 and he was transferred to [facility] and he was dialyzed daily over the [facility].  The etiology of the BURAK probably ATN associated with sepsis, but also he was on immune checkpoint therapy which is known to be associated with the interstitial nephritis, the renal function is recovering, his creatinine is 1.64 very stable, no need for any further dialysis  Hypokalemia and hypomagnesemia potassium 3.4 magnesium 1.5  Metastatic melanoma previously on immunotherapy  Adrenal insufficiency currently on steroid  Dependent edema associated with hypoalbuminemia and mild fluid excess    PLAN:  Replete magnesium and potassium  No further indication for dialysis  Continue oral torsemide  If the patient is discharged needs follow-up in our office in 1 to 2 weeks  postdischarge      I reviewed the chart and other providers notes, I reviewed imaging and lab data.  I discussed the case with the patient and he voiced good understanding  Copied text in this note has been reviewed and is accurate as of 10/20/23.       Thank you for involving us in the care of Delvin Cardozo.  Please feel free to call with any questions.    Antoine Hay MD  10/20/23  09:13 EDT    [facility]  [phone]    Please note that portions of this note were completed with a voice recognition program.

## 2023-10-22 NOTE — PAYOR COMM NOTE
"Chandler Cardozo (63 y.o. Male)          DC SUMMARY FOR [id number]          Date of Birth   1960    Social Security Number       Address   [address] [facility] [address] [facility] [address]    Home Phone       MRN   [id number]       Restoration   Sabianism    Marital Status                               Admission Date   10/11/23    Admission Type   Emergency    Admitting Provider   Jesus Wilcox Jr., MD    Attending Provider       Department, Room/Bed   [address], P488/1       Discharge Date   10/20/2023    Discharge Disposition   Skilled Nursing Facility ([place] - External)    Discharge Destination   Other                              Attending Provider: (none)   Allergies: Kiwi Extract    Isolation: None   Infection: None   Code Status: Prior    Ht: 170.2 cm (67\")   Wt: 79.8 kg (175 lb 14.8 oz)    Admission Cmt: None   Principal Problem: Septic shock [A41.9,R65.21]                   Active Insurance as of 10/11/2023       Primary Coverage       Payor Plan Insurance Group Employer/Plan Group    [facility] [place] [facility] BY ERNESTO [id number]       Payor Plan Address Payor Plan Phone Number Payor Plan Fax Number Effective Dates    [address] [address]   2/1/2022 - None Entered    [facility] [address]         Subscriber Name Subscriber Birth Date Member ID       CHANDLER CARDOZO 1960 [id number]                     Emergency Contacts        (Rel.) Home Phone Work Phone Mobile Phone    ANN CARDOZO (Brother) [phone]    ANTHONY DWYER (Sister) [phone]    Judy Garg (Sister) -- -- [phone]    Dr. Eddi Cardozo (Brother) -- -- [phone]                 Discharge Summary        Jeff Chavez MD at 10/19/23 0949            Date of Admission: 10/11/2023  Date of Discharge:  10/19/2023  Primary Care Physician: Provider, No Known     Discharge Diagnosis:  Active Hospital Problems "    Diagnosis  POA    **Septic shock [A41.9, R65.21]  Yes    BURAK (acute kidney injury) [N17.9]  Unknown    Metastatic melanoma [C43.9]  Unknown    Adrenal insufficiency [E27.40]  Unknown    DM (diabetes mellitus), type 2 [E11.9]  Unknown      Resolved Hospital Problems   No resolved problems to display.       DETAILS OF HOSPITAL STAY     Pertinent Test Results and Procedures Performed    Head CT:  1. No acute intracranial abnormality is identified. There is mild small   vessel disease in the periventricular white matter of the cerebral   hemispheres. There is mild diffuse cerebral atrophy. The remainder of   the head CT is normal. The etiology of the patient's confusion and   altered mental status is not established on this exam.     Chest x-ray:  1. No definite active disease is seen in the chest. Lung volumes are low   with horizontal platelike atelectasis at the lung bases. There is a   right internal jugular HemoCath in place with its tip in the superior   aspect of the right atrium.     Right upper quadrant ultrasound:  1.  Mildly enlarged diffusely heterogeneous liver with multiple   intrahepatic lesions, measuring up to 4.7 cm, as above. Additional less   defined intrahepatic lesions are suspected. Findings are concerning for   malignancy, including metastatic disease, possibly superimposed on   cirrhosis. Recommend correlation with contrast-enhanced CT or MRI and   comparison with prior imaging.   2.  Cholelithiasis. No biliary ductal dilatation.     CT scan of the chest, abdomen, and pelvis:  1. Pathologic left axillary, subpectoral, infra-axillary lymph nodes.   Left posterolateral flank cutaneous/subcutaneous mass measuring 9.5 cm.   Extensive hepatic metastatic disease and periportal and to a lesser   degree right cardiophrenic omid enlargement.   2. Small pericardial effusion. Small left pleural effusion. Left lower   lobe consolidation with atelectasis or potential mild infiltrate.   3. 14 mm splenic  low-density lesion, potential metastasis. Mild   splenomegaly. Cholelithiasis.   4. Healing left anterior rib fractures.     Hospital Course  This is a 63-year-old male who was initially admitted to the intensive care unit after he presented to the emergency room with shock presumably of septic origin.  Please see intensivist H&P for full details.  He was initiated on broad-spectrum antibiotics.  Infectious disease was consulted.  No definitive source of infection was found.  He was ultimately taken off antibiotics and has remained stable/afebrile for the past 5 days.  The patient does have widely metastatic melanoma which is followed downtown at the [facility].  He had a previous hospitalization for sepsis and acute kidney injury with ATN requiring initiation of dialysis.  This was continued upon his admission.  He has shown good renal recovery and no longer requiring dialysis and his tunneled dialysis catheter was removed yesterday.  The patient showed evidence of confusion and very bizarre behavior upon presentation.  This is now greatly improved and he is very awake, alert, and conversant.  At this point he is medically stable.  Consulting services have all signed off.  He will be released to rehab today.  He will follow-up with nephrology in 1 to 2 weeks and have instructed the patient to call his oncologist office to schedule follow-up now that he is out of the hospital as his care there has been interrupted due to repeated hospitalizations recently.  He expresses understanding and agreement with this plan.    Physical Exam at Discharge:  General: No acute distress, AAOx3, chronically ill-appearing  HEENT: EOMI, PERRL  Cardiovascular: +s1 and s2, RRR  Lungs: No rhonchi or wheezing  Abdomen: soft, nontender    Consults:   Consults       Date and Time Order Name Status Description    10/18/2023  9:58 AM Inpatient Vascular Surgery Consult Completed     10/13/2023  9:33 AM Inpatient Hospitalist Consult       10/12/2023 10:42 AM Inpatient Nephrology Consult Completed     10/12/2023  1:18 AM Inpatient Cardiology Consult Completed     10/12/2023  1:18 AM Inpatient Infectious Diseases Consult Completed               Condition on Discharge: Stable, improved    Discharge Disposition  Skilled Nursing Facility ([place] - External)    Discharge Medications     Discharge Medications        New Medications        Instructions Start Date   folic acid 1 MG tablet  Commonly known as: FOLVITE   1 mg, Oral, Daily             Changes to Medications        Instructions Start Date   Torsemide 40 MG tablet  What changed:   medication strength  how much to take   40 mg, Oral, Daily             Continue These Medications        Instructions Start Date   amphetamine-dextroamphetamine 5 MG tablet  Commonly known as: ADDERALL   5 mg, Oral, 3 Times Daily      aspirin 81 MG chewable tablet   81 mg, Oral, Daily      atorvastatin 20 MG tablet  Commonly known as: LIPITOR   20 mg, Oral, Daily      clonazePAM 0.5 MG tablet  Commonly known as: KlonoPIN   0.5 mg, Oral, 2 Times Daily PRN      melatonin 5 MG tablet tablet   5 mg, Oral, Nightly PRN      omega-3 acid ethyl esters 1 g capsule  Commonly known as: LOVAZA   1 g, Oral, 2 Times Daily      One Daily Womens tablet tablet  Generic drug: multivitamin with minerals   1 tablet, Oral, Daily      pantoprazole 40 MG EC tablet  Commonly known as: PROTONIX   40 mg, Oral, Daily      predniSONE 10 MG tablet  Commonly known as: DELTASONE   10 mg, Oral, Daily      venlafaxine 75 MG tablet  Commonly known as: EFFEXOR   75 mg, Oral, Daily               Discharge Diet:   Diet Instructions       Diet: Regular/House Diet, Diabetic Diets; Consistent Carbohydrate; Thin (IDDSI 0)      Discharge Diet:  Regular/House Diet  Diabetic Diets       Diabetic Diet: Consistent Carbohydrate    Fluid Consistency: Thin (IDDSI 0)            Activity at Discharge:   Activity Instructions       Activity as Tolerated               Follow-up Appointments  No future appointments.  Additional Instructions for the Follow-ups that You Need to Schedule       Discharge Follow-up with PCP   As directed       Currently Documented PCP:    Provider, No Known    PCP Phone Number:    [phone]     Follow Up Details: 1 week        Discharge Follow-up with Specialty: [facility]; 2 Weeks   As directed      [facility]: [facility]   Follow Up: 2 Weeks        Discharge Follow-up with Specialty: Primary oncologist at [facility] as soon as possible postdischarge   As directed      Specialty: Primary oncologist at [facility] as soon as possible postdischarge                  I have examined and discussed discharge planning with the patient today.    I wore full protective equipment throughout the patient encounter including eye protection and facemask.  Hand hygiene was performed before donning protective equipment and after removal when leaving the room.     Jeff Chavez MD  10/19/23  09:49 EDT    Time: Discharge greater than 30 min            Electronically signed by Jeff Chavez MD at 10/19/23 0955       Jeff Chavez MD at 10/20/23 0927            Date of Admission: 10/11/2023  Date of Discharge:  10/20/2023  Primary Care Physician: Provider, No Known     Discharge Diagnosis:  Active Hospital Problems    Diagnosis  POA    **Septic shock [A41.9, R65.21]  Yes    BURAK (acute kidney injury) [N17.9]  Unknown    Metastatic melanoma [C43.9]  Unknown    Adrenal insufficiency [E27.40]  Unknown    DM (diabetes mellitus), type 2 [E11.9]  Unknown      Resolved Hospital Problems   No resolved problems to display.       DETAILS OF HOSPITAL STAY     Pertinent Test Results and Procedures Performed    Head CT:  1. No acute intracranial abnormality is identified. There is mild small   vessel disease in the periventricular white matter of the cerebral    hemispheres. There is mild diffuse cerebral atrophy. The remainder of   the head CT is normal. The etiology of the patient's confusion and   altered mental status is not established on this exam.      Chest x-ray:  1. No definite active disease is seen in the chest. Lung volumes are low   with horizontal platelike atelectasis at the lung bases. There is a   right internal jugular HemoCath in place with its tip in the superior   aspect of the right atrium.      Right upper quadrant ultrasound:  1.  Mildly enlarged diffusely heterogeneous liver with multiple   intrahepatic lesions, measuring up to 4.7 cm, as above. Additional less   defined intrahepatic lesions are suspected. Findings are concerning for   malignancy, including metastatic disease, possibly superimposed on   cirrhosis. Recommend correlation with contrast-enhanced CT or MRI and   comparison with prior imaging.   2.  Cholelithiasis. No biliary ductal dilatation.      CT scan of the chest, abdomen, and pelvis:  1. Pathologic left axillary, subpectoral, infra-axillary lymph nodes.   Left posterolateral flank cutaneous/subcutaneous mass measuring 9.5 cm.   Extensive hepatic metastatic disease and periportal and to a lesser   degree right cardiophrenic omid enlargement.   2. Small pericardial effusion. Small left pleural effusion. Left lower   lobe consolidation with atelectasis or potential mild infiltrate.   3. 14 mm splenic low-density lesion, potential metastasis. Mild   splenomegaly. Cholelithiasis.   4. Healing left anterior rib fractures.      Hospital Course  This is a 63-year-old male who was initially admitted to the intensive care unit after he presented to the emergency room with shock presumably of septic origin.  Please see intensivist H&P for full details.  He was initiated on broad-spectrum antibiotics.  Infectious disease was consulted.  No definitive source of infection was found.  He was ultimately taken off antibiotics and has remained  stable/afebrile for the past 5 days.  The patient does have widely metastatic melanoma which is followed downtown at the [facility].  He had a previous hospitalization for sepsis and acute kidney injury with ATN requiring initiation of dialysis.  This was continued upon his admission.  He has shown good renal recovery and no longer requiring dialysis and his tunneled dialysis catheter was removed yesterday.  The patient showed evidence of confusion and very bizarre behavior upon presentation.  This is now greatly improved and he is very awake, alert, and conversant.  At this point he is medically stable.  Consulting services have all signed off.  He will be released to rehab today.  He will follow-up with nephrology in 1 to 2 weeks and have instructed the patient to call his oncologist office to schedule follow-up now that he is out of the hospital as his care there has been interrupted due to repeated hospitalizations recently.  He expresses understanding and agreement with this plan.     Physical Exam at Discharge:  General: No acute distress, AAOx3, chronically ill-appearing  HEENT: EOMI, PERRL  Cardiovascular: +s1 and s2, RRR  Lungs: No rhonchi or wheezing  Abdomen: soft, nontender    Consults:   Consults       Date and Time Order Name Status Description    10/18/2023  9:58 AM Inpatient Vascular Surgery Consult Completed     10/13/2023  9:33 AM Inpatient Hospitalist Consult      10/12/2023 10:42 AM Inpatient Nephrology Consult Completed     10/12/2023  1:18 AM Inpatient Cardiology Consult Completed     10/12/2023  1:18 AM Inpatient Infectious Diseases Consult Completed               Condition on Discharge: Stable, improved    Discharge Disposition  Skilled Nursing Facility ([place] - External)    Discharge Medications     Discharge Medications        New Medications        Instructions Start Date   folic acid 1 MG tablet  Commonly known as: FOLVITE   1 mg, Oral, Daily             Changes to Medications         Instructions Start Date   Torsemide 40 MG tablet  What changed:   medication strength  how much to take   40 mg, Oral, Daily             Continue These Medications        Instructions Start Date   amphetamine-dextroamphetamine 5 MG tablet  Commonly known as: ADDERALL   5 mg, Oral, 3 Times Daily      aspirin 81 MG chewable tablet   81 mg, Oral, Daily      atorvastatin 20 MG tablet  Commonly known as: LIPITOR   20 mg, Oral, Daily      clonazePAM 0.5 MG tablet  Commonly known as: KlonoPIN   0.5 mg, Oral, 2 Times Daily PRN      melatonin 5 MG tablet tablet   5 mg, Oral, Nightly PRN      omega-3 acid ethyl esters 1 g capsule  Commonly known as: LOVAZA   1 g, Oral, 2 Times Daily      One Daily Womens tablet tablet  Generic drug: multivitamin with minerals   1 tablet, Oral, Daily      pantoprazole 40 MG EC tablet  Commonly known as: PROTONIX   40 mg, Oral, Daily      predniSONE 10 MG tablet  Commonly known as: DELTASONE   10 mg, Oral, Daily      venlafaxine 75 MG tablet  Commonly known as: EFFEXOR   75 mg, Oral, Daily               Discharge Diet:   Diet Instructions       Diet: Regular/House Diet, Diabetic Diets; Consistent Carbohydrate; Thin (IDDSI 0)      Discharge Diet:  Regular/House Diet  Diabetic Diets       Diabetic Diet: Consistent Carbohydrate    Fluid Consistency: Thin (IDDSI 0)            Activity at Discharge:   Activity Instructions       Activity as Tolerated              Follow-up Appointments  No future appointments.  Additional Instructions for the Follow-ups that You Need to Schedule       Discharge Follow-up with PCP   As directed       Currently Documented PCP:    Provider, No Known    PCP Phone Number:    [phone]     Follow Up Details: 1 week        Discharge Follow-up with Specialty: [facility]; 2 Weeks   As directed      Specialty: [facility]   Follow Up: 2 Weeks        Discharge Follow-up with Specialty: Primary oncologist at [facility]  Center as soon as possible postdischarge   As directed      Specialty: Primary oncologist at [facility] as soon as possible postdischarge                  I have examined and discussed discharge planning with the patient today.    I wore full protective equipment throughout the patient encounter including eye protection and facemask.  Hand hygiene was performed before donning protective equipment and after removal when leaving the room.     Jeff Chavez MD  10/20/23  09:27 EDT    Time: Discharge greater than 30 min            Electronically signed by Jeff Chavez MD at 10/20/23 0627

## 2023-10-26 PROBLEM — A41.9 SEPSIS: Status: ACTIVE | Noted: 2023-01-01

## 2023-10-26 NOTE — ED TRIAGE NOTES
To ER via EMS from [facility]  c/o N/V all day.      Pt has hx of multiple myeloma with mets to liver, bile duct, and spleen.      Pt received Zofran PO at 1800 without relief.      22ga SL in rt upper arm per facility.  Facility has NS @ 100cc/hr running.  D/c's on EMS arrival.

## 2023-10-26 NOTE — DISCHARGE PLACEMENT REQUEST
"Chandler Cardozo (63 y.o. Male)       Date of Birth   1960    Social Security Number       Address   [address] [place] [address]    Home Phone   [phone]    MRN   [id number]       Mormon   None    Marital Status                               Admission Date   10/25/23    Admission Type   Emergency    Admitting Provider   Toni Betancourt MD    Attending Provider   Toni Betancourt MD    Department, Room/Bed   [address], N[address]/1       Discharge Date       Discharge Disposition       Discharge Destination                                 Attending Provider: Toni Betancourt MD    Allergies: Silvadene [Silver Sulfadiazine]    Isolation: Spore   Infection: Norovirus (10/26/23)   Code Status: CPR    Ht: 177.8 cm (70\")   Wt: 72.5 kg (159 lb 13.3 oz)    Admission Cmt: None   Principal Problem: Sepsis [A41.9]                   Active Insurance as of 10/25/2023       Primary Coverage       Payor Plan Insurance Group Employer/Plan [facility] BY ERNESTO [id number]       Payor Plan Address Payor Plan Phone Number Payor Plan Fax Number Effective Dates    [address] [address]   2/1/2022 - None Entered    [facility] [address]         Subscriber Name Subscriber Birth Date Member ID       CHANDLER CARDOZO 1960 [id number]                     Emergency Contacts        (Rel.) Home Phone Work Phone Mobile Phone    SUZANNE MELTON (Sister) [phone] -- --    ANN ROJAS (Brother) [phone] -- --    DR ANNAMARIA CARDOZO (Brother) [phone] -- --    ANTHONY DWYER (Sister) [phone] -- --                "

## 2023-10-26 NOTE — ED NOTES
Nursing report ED to floor  Delvin Cardozo  63 y.o.  male    HPI :   Chief Complaint   Patient presents with    Vomiting       Admitting doctor:   Jeff Chavez MD    Admitting diagnosis:   There were no encounter diagnoses.    Code status:   Current Code Status       Date Active Code Status Order ID Comments User Context       Not on file            Allergies:   Silvadene [silver sulfadiazine]    Isolation:   No active isolations    Intake and Output    Intake/Output Summary (Last 24 hours) at 10/26/2023 0109  Last data filed at 10/26/2023 0106  Gross per 24 hour   Intake 50 ml   Output --   Net 50 ml       Weight:       10/25/23  2045   Weight: 80.7 kg (178 lb)       Most recent vitals:   Vitals:    10/25/23 2332 10/26/23 0001 10/26/23 0101 10/26/23 0102   BP:  126/71 103/57    Pulse:  104 100 100   Resp:       Temp:       TempSrc:       SpO2: 93%  95%    Weight:       Height:           Active LDAs/IV Access:   Lines, Drains & Airways       Active LDAs       Name Placement date Placement time Site Days    Peripheral IV 10/25/23 2140 Left;Posterior Hand 10/25/23  2140  Hand  less than 1                    Labs (abnormal labs have a star):   Labs Reviewed   LACTIC ACID, PLASMA - Abnormal; Notable for the following components:       Result Value    Lactate 3.3 (*)     All other components within normal limits   CBC WITH AUTO DIFFERENTIAL - Abnormal; Notable for the following components:    WBC 23.70 (*)     RBC 4.08 (*)     Hemoglobin 10.8 (*)     Hematocrit 35.1 (*)     MCH 26.5 (*)     MCHC 30.8 (*)     RDW 17.1 (*)     Neutrophil % 78.1 (*)     Lymphocyte % 12.6 (*)     Monocyte % 4.3 (*)     Immature Grans % 4.1 (*)     Neutrophils, Absolute 18.51 (*)     Monocytes, Absolute 1.01 (*)     Immature Grans, Absolute 0.97 (*)     nRBC 0.6 (*)     All other components within normal limits   RESPIRATORY PANEL PCR W/ COVID-19 (SARS-COV-2), NP SWAB IN UTM/VTP, 3-4 HR TAT - Normal    Narrative:     In the  setting of a positive respiratory panel with a viral infection PLUS a negative procalcitonin without other underlying concern for bacterial infection, consider observing off antibiotics or discontinuation of antibiotics and continue supportive care. If the respiratory panel is positive for atypical bacterial infection (Bordetella pertussis, Chlamydophila pneumoniae, or Mycoplasma pneumoniae), consider antibiotic de-escalation to target atypical bacterial infection.   LIPASE - Normal   GASTROINTESTINAL PANEL, PCR (PREFERRED) DOES NOT INCLUDE CDIFF   BLOOD CULTURE   BLOOD CULTURE   COMPREHENSIVE METABOLIC PANEL   URINALYSIS W/ MICROSCOPIC IF INDICATED (NO CULTURE)   LACTIC ACID, REFLEX   LACTIC ACID, REFLEX   CBC AND DIFFERENTIAL    Narrative:     The following orders were created for panel order CBC & Differential.  Procedure                               Abnormality         Status                     ---------                               -----------         ------                     CBC Auto Differential[[id number]]        Abnormal            Final result                 Please view results for these tests on the individual orders.       EKG:   No orders to display       Meds given in ED:   Medications   sodium chloride 0.9 % flush 10 mL (has no administration in time range)   lactated ringers infusion (125 mL/hr Intravenous Currently Infusing 10/26/23 0010)   ondansetron (ZOFRAN) injection 4 mg (4 mg Intravenous Given 10/25/23 2147)   piperacillin-tazobactam (ZOSYN) 3.375 g in iso-osmotic dextrose 50 ml (premix) (0 g Intravenous Stopped 10/26/23 0106)       Imaging results:  CT Abdomen Pelvis Without Contrast    Result Date: 10/25/2023  1. Left chest wall masses. Given the other findings these are likely neoplastic. 2. Liver enlargement with multiple low-density liver lesions consistent with metastatic disease. 3. Cholelithiasis.  Radiation dose reduction techniques were utilized, including automated exposure  control and exposure modulation based on body size.        Ambulatory status:   - bedrest    Social issues:   Social History     Socioeconomic History    Marital status:        NIH Stroke Scale:       Olamide Lipscomb RN  10/26/23 01:09 EDT

## 2023-10-26 NOTE — PROGRESS NOTES
[facility] Clinical Pharmacy Services: Piperacillin-Tazobactam Consult    Pt Name: Delvin Cardozo   : 1960    Indication: Sepsis    Relevant clinical data and objective history reviewed:    History reviewed. No pertinent past medical history.  Creatinine   Date Value Ref Range Status   10/26/2023 5.51 (H) 0.76 - 1.27 mg/dL Final     BUN   Date Value Ref Range Status   10/26/2023 56 (H) 8 - 23 mg/dL Final     Estimated Creatinine Clearance: 15.9 mL/min (A) (by C-G formula based on SCr of 5.51 mg/dL (H)).    Lab Results   Component Value Date    WBC 23.70 (H) 10/25/2023     Temp Readings from Last 3 Encounters:   10/25/23 97.8 °F (36.6 °C) (Tympanic)      Assessment/Plan  Estimated CrCl <20 mL/min at this time; BMI 25.54 kg/m2  Will start piperacillin-tazobactam 3.375 g IV every 12 hours     Pharmacy will continue to follow daily while on piperacillin-tazobactam and adjust as needed. Thank you for this consult.    uRss Olmstead III, [facility]  Clinical Pharmacist

## 2023-10-26 NOTE — ED NOTES
Second blood culture drawn with 21g butterfly to R index finger from this EDT. Pt is a difficult stick and has been stuck multiple times from different people. Lab was unsuccessful for blood culture draw. Short draw noted to blue top jace rahman unsuccessful.

## 2023-10-26 NOTE — ED PROVIDER NOTES
EMERGENCY DEPARTMENT ENCOUNTER    Room Number:  N635/1  Date seen:  10/26/2023  PCP: Adrien Garcia MD  Historian(s): Patient, paramedics, patient's sister      HPI:  Chief Complaint: Nausea, vomiting, diarrhea, abdominal pain, weakness  A complete HPI/ROS/PMH/PSH/SH/FH are unobtainable / limited due to: Patient somewhat hard of hearing  Context: Delvin Cardozo is a 63 y.o. male who presents to the ED via EMS from nursing facility for evaluation of persistent nausea and vomiting and diarrhea with abdominal pain.  Patient has a history of multiple myeloma with metastatic disease.  He says for the past 3 days he has had persistent nausea and poor appetite.  Apparently today the nausea and vomiting have been increasingly worse.  He has had some loose stools at times.  There is been no blood, however.        PAST MEDICAL HISTORY  Active Ambulatory Problems     Diagnosis Date Noted    No Active Ambulatory Problems     Resolved Ambulatory Problems     Diagnosis Date Noted    No Resolved Ambulatory Problems     No Additional Past Medical History         PAST SURGICAL HISTORY  History reviewed. No pertinent surgical history.      FAMILY HISTORY  History reviewed. No pertinent family history.      SOCIAL HISTORY  Social History     Socioeconomic History    Marital status:          ALLERGIES  Silvadene [silver sulfadiazine]        REVIEW OF SYSTEMS  Review of Systems   Constitutional:  Positive for activity change, appetite change and fatigue. Negative for fever.   HENT: Negative.     Eyes:  Negative for pain and visual disturbance.   Respiratory:  Negative for cough and shortness of breath.    Cardiovascular:  Negative for chest pain.   Gastrointestinal:  Positive for abdominal pain, diarrhea, nausea and vomiting. Negative for blood in stool.   Genitourinary:  Positive for decreased urine volume. Negative for dysuria.   Skin:  Negative for color change.   Neurological:  Positive for weakness. Negative for syncope and  headaches.   All other systems reviewed and are negative.           PHYSICAL EXAM  ED Triage Vitals [10/25/23 2045]   Temp Heart Rate Resp BP SpO2   97.8 °F (36.6 °C) 104 16 102/50 97 %      Temp src Heart Rate Source Patient Position BP Location FiO2 (%)   Tympanic -- -- -- --       Physical Exam      GENERAL: Somewhat ill-appearing, no diaphoresis, holding an emesis bag  HENT: nares patent, normocephalic and atraumatic, mucous membranes somewhat dry  EYES: no scleral icterus, EOMI, normal conjunctivae  CV: regular rhythm, normal rate around 100 bpm, normal distal pulses  RESPIRATORY: normal effort, no stridor, diminished at the bilateral bases.  No wheezes or rales or rhonchi  ABDOMEN: soft, mild diffuse tenderness in all 4 quadrants equally.  Bowel sounds are hypoactive but present  MUSCULOSKELETAL: no deformity, no asymmetry  NEURO: alert, moves all extremities, follows commands, no facial droop  PSYCH:  calm, cooperative, judgment appears somewhat sluggish  SKIN: warm, dry    Vital signs and nursing notes reviewed.        LAB RESULTS  Recent Results (from the past 24 hour(s))   Lipase    Collection Time: 10/25/23  9:42 PM    Specimen: Hand, Left; Blood   Result Value Ref Range    Lipase 50 13 - 60 U/L   Lactic Acid, Plasma    Collection Time: 10/25/23  9:42 PM    Specimen: Hand, Left; Blood   Result Value Ref Range    Lactate 3.3 (C) 0.5 - 2.0 mmol/L   CBC Auto Differential    Collection Time: 10/25/23  9:42 PM    Specimen: Hand, Left; Blood   Result Value Ref Range    WBC 23.70 (H) 3.40 - 10.80 10*3/mm3    RBC 4.08 (L) 4.14 - 5.80 10*6/mm3    Hemoglobin 10.8 (L) 13.0 - 17.7 g/dL    Hematocrit 35.1 (L) 37.5 - 51.0 %    MCV 86.0 79.0 - 97.0 fL    MCH 26.5 (L) 26.6 - 33.0 pg    MCHC 30.8 (L) 31.5 - 35.7 g/dL    RDW 17.1 (H) 12.3 - 15.4 %    RDW-SD 52.5 37.0 - 54.0 fl    MPV 9.9 6.0 - 12.0 fL    Platelets 361 140 - 450 10*3/mm3    Neutrophil % 78.1 (H) 42.7 - 76.0 %    Lymphocyte % 12.6 (L) 19.6 - 45.3 %     Monocyte % 4.3 (L) 5.0 - 12.0 %    Eosinophil % 0.3 0.3 - 6.2 %    Basophil % 0.6 0.0 - 1.5 %    Immature Grans % 4.1 (H) 0.0 - 0.5 %    Neutrophils, Absolute 18.51 (H) 1.70 - 7.00 10*3/mm3    Lymphocytes, Absolute 2.99 0.70 - 3.10 10*3/mm3    Monocytes, Absolute 1.01 (H) 0.10 - 0.90 10*3/mm3    Eosinophils, Absolute 0.07 0.00 - 0.40 10*3/mm3    Basophils, Absolute 0.15 0.00 - 0.20 10*3/mm3    Immature Grans, Absolute 0.97 (H) 0.00 - 0.05 10*3/mm3    nRBC 0.6 (H) 0.0 - 0.2 /100 WBC   Respiratory Panel PCR w/COVID-19(SARS-CoV-2) [facility]/DYLAN/PAD/[facility]/NIGEL In-House, NP Swab in [facility], 3-4 HR TAT - Swab, Nasopharynx    Collection Time: 10/25/23 11:03 PM    Specimen: Nasopharynx; Swab   Result Value Ref Range    ADENOVIRUS, PCR Not Detected Not Detected    Coronavirus 229E Not Detected Not Detected    Coronavirus HKU1 Not Detected Not Detected    Coronavirus NL63 Not Detected Not Detected    Coronavirus OC43 Not Detected Not Detected    COVID19 Not Detected Not Detected - Ref. Range    Human Metapneumovirus Not Detected Not Detected    Human Rhinovirus/Enterovirus Not Detected Not Detected    Influenza A PCR Not Detected Not Detected    Influenza B PCR Not Detected Not Detected    Parainfluenza Virus 1 Not Detected Not Detected    Parainfluenza Virus 2 Not Detected Not Detected    Parainfluenza Virus 3 Not Detected Not Detected    Parainfluenza Virus 4 Not Detected Not Detected    RSV, PCR Not Detected Not Detected    Bordetella pertussis pcr Not Detected Not Detected    Bordetella parapertussis PCR Not Detected Not Detected    Chlamydophila pneumoniae PCR Not Detected Not Detected    Mycoplasma pneumo by PCR Not Detected Not Detected   Comprehensive Metabolic Panel    Collection Time: 10/26/23 12:24 AM    Specimen: Hand, Left; Blood   Result Value Ref Range    Glucose 101 (H) 65 - 99 mg/dL    BUN 56 (H) 8 - 23 mg/dL    Creatinine 5.51 (H) 0.76 - 1.27 mg/dL    Sodium 134 (L) 136 - 145 mmol/L    Potassium 5.5 (H) 3.5 -  5.2 mmol/L    Chloride 84 (L) 98 - 107 mmol/L    CO2 16.2 (L) 22.0 - 29.0 mmol/L    Calcium 9.8 8.6 - 10.5 mg/dL    Total Protein 6.5 6.0 - 8.5 g/dL    Albumin 3.0 (L) 3.5 - 5.2 g/dL    ALT (SGPT) 66 (H) 1 - 41 U/L    AST (SGOT) 166 (H) 1 - 40 U/L    Alkaline Phosphatase 1,012 (H) 39 - 117 U/L    Total Bilirubin 2.1 (H) 0.0 - 1.2 mg/dL    Globulin 3.5 gm/dL    A/G Ratio 0.9 g/dL    BUN/Creatinine Ratio 10.2 7.0 - 25.0    Anion Gap 33.8 (H) 5.0 - 15.0 mmol/L    eGFR 11.0 (L) >60.0 mL/min/1.73   STAT Lactic Acid, Reflex    Collection Time: 10/26/23 12:30 AM    Specimen: Blood   Result Value Ref Range    Lactate 2.7 (C) 0.5 - 2.0 mmol/L       Ordered the above labs and reviewed the results.        RADIOLOGY  CT Abdomen Pelvis Without Contrast    Result Date: 10/25/2023  CT of the abdomen and pelvis without contrast 10/25/2023  HISTORY: Abdominal pain. Vomiting. HISTORY of metastatic disease  Spiral images were obtained from the lung bases to the symphysis pubis. No intravenous or oral contrast was given.  There are at least 3 subcutaneous masses in the left chest wall largest measuring 3.4 cm.  The liver is enlarged and there are innumerable low-density lesions likely metastatic. At least 3-4 calcified gallstones are seen.  The spleen, pancreas, adrenals and kidneys appear unremarkable.  No bowel wall thickening or bowel dilatation is seen. Prostate gland calcifications are seen. Urinary bladder is unremarkable. There is mild compression deformity of the L4 vertebrae which does not appear acute. There are some areas of sclerosis along the endplates of several vertebrae which may be related to Schmorl's nodes. These do not appear to represent metastatic lesions.      1. Left chest wall masses. Given the other findings these are likely neoplastic. 2. Liver enlargement with multiple low-density liver lesions consistent with metastatic disease. 3. Cholelithiasis.  Radiation dose reduction techniques were utilized,  including automated exposure control and exposure modulation based on body size.        Ordered the above noted radiological studies. Reviewed by me in PACS.          PROCEDURES  Critical Care    Performed by: Michael Dowell MD  Authorized by: Michael Dowell MD    Critical care provider statement:     Critical care time (minutes):  35    Critical care time was exclusive of:  Separately billable procedures and treating other patients and teaching time    Critical care was necessary to treat or prevent imminent or life-threatening deterioration of the following conditions:  Sepsis and dehydration    Critical care was time spent personally by me on the following activities:  Development of treatment plan with patient or surrogate, discussions with consultants, evaluation of patient's response to treatment, examination of patient, interpretation of cardiac output measurements, obtaining history from patient or surrogate, ordering and performing treatments and interventions, ordering and review of laboratory studies, ordering and review of radiographic studies, pulse oximetry, re-evaluation of patient's condition and review of old charts    I assumed direction of critical care for this patient from another provider in my specialty: no      Care discussed with: admitting provider            MEDICATIONS GIVEN IN ER  Medications   sodium chloride 0.9 % flush 10 mL (has no administration in time range)   lactated ringers infusion (125 mL/hr Intravenous Currently Infusing 10/26/23 0010)   sodium chloride 0.9 % flush 10 mL (has no administration in time range)   sodium chloride 0.9 % flush 10 mL (has no administration in time range)   sodium chloride 0.9 % infusion 40 mL (has no administration in time range)   nitroglycerin (NITROSTAT) SL tablet 0.4 mg (has no administration in time range)   sodium chloride 0.9 % infusion (has no administration in time range)   ondansetron (ZOFRAN) tablet 4 mg (has no administration in  time range)     Or   ondansetron (ZOFRAN) injection 4 mg (has no administration in time range)   calcium carbonate (TUMS) chewable tablet 500 mg (200 mg elemental) (has no administration in time range)   Pharmacy to Dose Zosyn (has no administration in time range)   dextrose (GLUTOSE) oral gel 15 g (has no administration in time range)   dextrose (D50W) (25 g/50 mL) IV injection 25 g (has no administration in time range)   glucagon (GLUCAGEN) injection 1 mg (has no administration in time range)   insulin lispro (HUMALOG/ADMELOG) injection 2-7 Units (has no administration in time range)   sodium chloride 0.9 % bolus 500 mL (has no administration in time range)   piperacillin-tazobactam (ZOSYN) 3.375 g in iso-osmotic dextrose 50 ml (premix) (has no administration in time range)   ondansetron (ZOFRAN) injection 4 mg (4 mg Intravenous Given 10/25/23 2147)   piperacillin-tazobactam (ZOSYN) 3.375 g in iso-osmotic dextrose 50 ml (premix) (0 g Intravenous Stopped 10/26/23 0106)         MEDICAL DECISION MAKING, PROGRESS, and CONSULTS    All labs have been independently reviewed by me.  All radiology studies have been reviewed by me and I have also reviewed the radiology report.   EKG's independently viewed and interpreted by me.  Discussion below represents my analysis of pertinent findings related to patient's condition, differential diagnosis, treatment plan and final disposition.      Additional sources:  - Discussed/ obtained information from independent historians:  I discussed with paramedics to receive report of patient's condition on arrival and treatments initiated in route to the hospital.    - External (non-ED) record review: I reviewed the recent hospital discharge summary from October 20, 2023.  He had been admitted initially to the ICU for concerns of septic shock and BURAK.    - Chronic or social conditions impacting care: Patient with multiple medical problems including metastatic cancer, recently discharged  from this facility within the past week and had been recovering at skilled nursing facility for a few days before this setback.      Additional orders considered but not ordered:  I considered ordering ultrasound the gallbladder but he had just recently had an right upper quadrant ultrasound study performed within the past week for his past hospital admission.  I will defer further radiology imaging test to be ordered by admitting service at this time.    Orders placed during this visit:  Orders Placed This Encounter   Procedures    Critical Care    Gastrointestinal Panel, PCR - Stool, Per Rectum    Respiratory Panel PCR w/COVID-19(SARS-CoV-2) [facility]/DYLAN/PAD/[facility]/MAD/NIGEL In-House, NP Swab in UTM/VTM, 3-4 HR TAT - Swab, Nasopharynx    Blood Culture - Blood,    Blood Culture - Blood,    CT Abdomen Pelvis Without Contrast    Comprehensive Metabolic Panel    Lipase    Urinalysis With Microscopic If Indicated (No Culture) - Urine, Clean Catch    Lactic Acid, Plasma    CBC Auto Differential    STAT Lactic Acid, Reflex    STAT Lactic Acid, Reflex    CBC (No Diff)    Comprehensive Metabolic Panel    Diet: Diabetic Diets; Consistent Carbohydrate; Texture: Regular Texture (IDDSI 7); Fluid Consistency: Thin (IDDSI 0)    Monitor Blood Pressure    Pulse Oximetry, Continuous    Vital Signs    Intake & Output    Weigh Patient    Oral Care    Place Sequential Compression Device    Maintain Sequential Compression Device    Telemetry - Maintain IV Access    Telemetry - Place Orders & Notify Provider of Results When Patient Experiences Acute Chest Pain, Dysrhythmia or Respiratory Distress    May Be Off Telemetry for Tests    Code Status and Medical Interventions:    ILENE (on-call MD unless specified) Details    POC Glucose 4x Daily Before Meals & at Bedtime    Insert Peripheral IV    Insert Peripheral IV    Inpatient Admission    CBC & Differential       Differential diagnosis includes but is not limited to:    Sepsis, bowel  obstruction, diverticulitis, dehydration, UTI, viral enteritis      Independent interpretation of labs, radiology studies, and discussions with consultants:  ED Course as of 10/26/23 0139   Wed Oct 25, 2023   2109 Patient appears somewhat ill here.  Persistent nausea and vomiting raise suspicion for possible bowel obstruction especially with his known cancer history.  We will start some IV fluids and antiemetics and order CT scan of the abdomen along with basic labs to check his electrolytes and renal function. [ELOISE]   2234 WBC(!): 23.70 [ELOISE]   2234 Lactate(!!): 3.3 [ELOISE]   Thu Oct 26, 2023   0102 Given patient's elevated white blood cell count, elevated lactic acid and tachycardia, he is triggering our sepsis protocol alarm and therefore I am going blood cultures and ordering a dose of IV Zosyn. [ELOISE]   0102 I independently interpreted the abdomen CT study and my findings are: No free air, no small bowel obstruction pattern.   [ELOISE]   0102 Respiratory viral panel is unremarkable.  Work of breathing remains normal and saturations are satisfactory on room air. [ELOISE]   0102 It seems that patient's nausea and vomiting have improved a lot now.  He is asking for something to drink, specifically some ginger ale.  We will allow him to take some sips of fluids to see if he can hold him down. [ELOISE]   0103 I discussed with Sally from [facility] about the patient.  She agrees to accept him to the hospital service on behalf of Dr. Chavez [ELOISE]   0103 Hemoglobin(!): 10.8 [ELOISE]   0104 Hematocrit(!): 35.1 [ELOISE]   0126 Creatinine(!): 5.51 [ELOISE]   0126 Alkaline Phosphatase(!): 1,012 [ELOISE]   0126 Total Bilirubin(!): 2.1 [ELOISE]   0126 Elevated LFTs are noted no as well as elevated creatinine.  Cholelithiasis was noted on the CT report from abdomen study earlier today.  Patient may benefit from ultrasound gallbladder study and GI consult.  I will page Sally from [facility] just to give her an update on this lab finding now that it has published. [ELOISE]       ED Course User Index  [ELOISE] Michael Dowell MD         DIAGNOSIS  Final diagnoses:   Sepsis with acute renal failure without septic shock, due to unspecified organism, unspecified acute renal failure type   Nausea and vomiting, unspecified vomiting type   Biliary calculus of other site with obstruction   Hyperkalemia         DISPOSITION  Admit to A, telemetry        Latest Documented Vital Signs:  As of 01:39 EDT  BP- 103/57 HR- 100 Temp- 97.8 °F (36.6 °C) (Tympanic) O2 sat- 95%              --    Please note that portions of this were completed with a voice recognition program.       Note Disclaimer: At [facility], we believe that sharing information builds trust and better relationships. You are receiving this note because you are receiving care at [facility] or recently visited. It is possible you will see health information before a provider has talked with you about it. This kind of information can be easy to misunderstand. To help you fully understand what it means for your health, we urge you to discuss this note with your provider.             Michael Dowell MD  10/26/23 0139

## 2023-10-26 NOTE — ED PROVIDER NOTES
I was called to the patient's bedside on [address]. secondary to a CODE BLUE.  Upon my arrival, the patient was unresponsive, asystolic, with CPR in progress per nursing staff.  He has been given IV epinephrine as well as IV sodium and bicarbonate.  I did intubate the patient orally myself without difficulty.  The patient did have a spontaneous return of circulation momentarily then once again lost pulse.  CPR was begun immediately and he was given another round of IV epinephrine.  Once again, he had return of spontaneous circulation and was transported to the intensive care unit.    Intubation    Date/Time: 10/26/2023 7:27 AM    Performed by: Richie Dasilva MD  Authorized by: Richie Dasilva MD    Consent:     Consent obtained:  Emergent situation  Universal protocol:     Patient identity confirmed:  Arm band  Pre-procedure details:     Indications: cardio/pulmonary arrest      Patient status:  Unresponsive    Look externally: no concerns      Mouth opening - incisor distance:  3 or more finger widths    Hyoid-mental distance: 3 or more finger widths      Hyoid-thyroid distance: 2 or more finger widths      Mallampati score:  I    Obstruction: none      Neck mobility: normal      Pharmacologic strategy: none      Induction agents:  None    Paralytics:  None  Procedure details:     Preoxygenation:  Bag valve mask    CPR in progress: yes      Number of attempts:  1  Successful intubation attempt details:     Intubation method:  Oral    Intubation technique: video assisted      Laryngoscope blade:  Mac 4    Bougie used: no      Tube size (mm):  7.5    Tube type:  Cuffed    Tube visualized through cords: yes    Placement assessment:     ETT at teeth/gumline (cm):  23    Tube secured with:  ETT mai    Breath sounds:  Equal    Placement verification: chest rise, colorimetric ETCO2, direct visualization and equal breath sounds    Post-procedure details:     Procedure completion:  Tolerated well, no immediate  complications         Richie Dasilva MD  10/26/23 0794

## 2023-10-26 NOTE — PLAN OF CARE
Problem: Adult Inpatient Plan of Care  Goal: Plan of Care Review  Flowsheets (Taken 10/26/2023 0504)  Progress: no change  Plan of Care Reviewed With: patient  Outcome Evaluation: No acute changes. RA O2. AOx4. IVF's running per orders. No c/o pain or discomfort. Bed locked and in lowest position. Side rails up x2. Call light within reach. Will continue to assess.  Goal: Absence of Hospital-Acquired Illness or Injury  Intervention: Identify and Manage Fall Risk  Flowsheets  Taken 10/26/2023 0428  Safety Promotion/Fall Prevention:   activity supervised   clutter free environment maintained   assistive device/personal items within reach   fall prevention program maintained   lighting adjusted   nonskid shoes/slippers when out of bed   room organization consistent   safety round/check completed  Taken 10/26/2023 0215  Safety Promotion/Fall Prevention:   activity supervised   assistive device/personal items within reach   clutter free environment maintained   fall prevention program maintained   lighting adjusted   nonskid shoes/slippers when out of bed   room organization consistent   safety round/check completed  Intervention: Prevent Skin Injury  Flowsheets  Taken 10/26/2023 0428  Body Position: position changed independently  Taken 10/26/2023 0215  Body Position: position changed independently  Skin Protection:   adhesive use limited   incontinence pads utilized   transparent dressing maintained   tubing/devices free from skin contact  Intervention: Prevent and Manage VTE (Venous Thromboembolism) Risk  Flowsheets  Taken 10/26/2023 0428  Activity Management: activity minimized  Taken 10/26/2023 0215  Activity Management: activity minimized  Range of Motion:   active ROM (range of motion) encouraged   ROM (range of motion) performed  Intervention: Prevent Infection  Flowsheets (Taken 10/26/2023 0336 by Sanjana De La Cruz, PCT)  Infection Prevention:   single patient room provided   rest/sleep promoted   personal  protective equipment utilized   hand hygiene promoted   environmental surveillance performed  Goal: Optimal Comfort and Wellbeing  Intervention: Provide Person-Centered Care  Flowsheets (Taken 10/26/2023 0215)  Trust Relationship/Rapport:   care explained   choices provided   emotional support provided   empathic listening provided   questions answered   questions encouraged   reassurance provided   thoughts/feelings acknowledged  Goal: Readiness for Transition of Care  Intervention: Mutually Develop Transition Plan  Flowsheets  Taken 10/26/2023 0339  Transportation Anticipated:   family or friend will provide   health plan transportation  Patient/Family Anticipated Services at Transition: rehabilitation services  Patient/Family Anticipates Transition to:   inpatient rehabilitation facility   long-term care facility  Taken 10/26/2023 0337  Equipment Currently Used at Home: walker, rolling     Problem: Diabetes Comorbidity  Goal: Blood Glucose Level Within Targeted Range  Intervention: Monitor and Manage Glycemia  Flowsheets (Taken 10/26/2023 0215)  Glycemic Management: blood glucose monitored     Problem: Skin Injury Risk Increased  Goal: Skin Health and Integrity  Intervention: Promote and Optimize Oral Intake  Flowsheets (Taken 10/26/2023 0215)  Oral Nutrition Promotion:   medicated   rest periods promoted  Intervention: Optimize Skin Protection  Flowsheets  Taken 10/26/2023 0428  Head of Bed (HOB) Positioning: HOB elevated  Taken 10/26/2023 0215  Pressure Reduction Techniques:   frequent weight shift encouraged   weight shift assistance provided  Head of Bed (HOB) Positioning: HOB elevated  Pressure Reduction Devices: positioning supports utilized  Skin Protection:   adhesive use limited   incontinence pads utilized   transparent dressing maintained   tubing/devices free from skin contact   Goal Outcome Evaluation:  Plan of Care Reviewed With: patient        Progress: no change  Outcome Evaluation: No acute  changes. RA O2. AOx4. IVF's running per orders. No c/o pain or discomfort. Bed locked and in lowest position. Side rails up x2. Call light within reach. Will continue to assess.

## 2023-10-26 NOTE — PAYOR COMM NOTE
"Chandler Cardozo (63 y.o. Male)      REQUESTING INPATIENT AUTHORIZATION:  ID# [id number]    REPLY TO UR DEPT: [phone],   [phone]    [facility]: NPI [id number]  [facility]# [id number]    KHURRAM RENNER RN,UR CCP     Date of Birth   1960    Social Security Number       Address   [address] [place], [place] [address]    Home Phone   [phone]    MRN   [id number]       Scientologist   None    Marital Status                               Admission Date   10/25/23    Admission Type   Emergency    Admitting Provider   Toni Betancourt MD    Attending Provider   Toni Betancourt MD    Department, Room/Bed   [address], N635/1       Discharge Date       Discharge Disposition       Discharge Destination                                 Attending Provider: Toni Betancourt MD    Allergies: Silvadene [Silver Sulfadiazine]    Isolation: Spore   Infection: Norovirus (10/26/23)   Code Status: CPR    Ht: 177.8 cm (70\")   Wt: 72.5 kg (159 lb 13.3 oz)    Admission Cmt: None   Principal Problem: Sepsis [A41.9]                   Active Insurance as of 10/25/2023       Primary Coverage       Payor Plan Insurance Group Employer/Plan Group    [facility] BY ERNESTO [facility] BY ERNESTO [id number]       Payor Plan Address Payor Plan Phone Number Payor Plan Fax Number Effective Dates    [address] [address]   2/1/2022 - None Entered    [facility] [id number]         Subscriber Name Subscriber Birth Date Member ID       CHANDLER CARDOZO 1960 [id number]                     Emergency Contacts        (Rel.) Home Phone Work Phone Mobile Phone    SUZANNE MELTON (Sister) [phone] -- --    ANN ROJAS (Brother) [phone] -- --    DR ANNAMARIA CARDOZO (Brother) [phone] -- --    ANTHONY DWYER (Sister) [phone] -- --              History & Physical    No notes of this type exist for this encounter.          Emergency Department Notes        Brown, " Richie Olmstead MD at 10/26/23 0726        Procedure Orders    1. Intubation [[id number]] ordered by Richie Dasilva MD                 I was called to the patient's bedside on 6 N. secondary to a CODE BLUE.  Upon my arrival, the patient was unresponsive, asystolic, with CPR in progress per nursing staff.  He has been given IV epinephrine as well as IV sodium and bicarbonate.  I did intubate the patient orally myself without difficulty.  The patient did have a spontaneous return of circulation momentarily then once again lost pulse.  CPR was begun immediately and he was given another round of IV epinephrine.  Once again, he had return of spontaneous circulation and was transported to the intensive care unit.    Intubation    Date/Time: 10/26/2023 7:27 AM    Performed by: Richie Dasilva MD  Authorized by: Richie Dasilva MD    Consent:     Consent obtained:  Emergent situation  Universal protocol:     Patient identity confirmed:  Arm band  Pre-procedure details:     Indications: cardio/pulmonary arrest      Patient status:  Unresponsive    Look externally: no concerns      Mouth opening - incisor distance:  3 or more finger widths    Hyoid-mental distance: 3 or more finger widths      Hyoid-thyroid distance: 2 or more finger widths      Mallampati score:  I    Obstruction: none      Neck mobility: normal      Pharmacologic strategy: none      Induction agents:  None    Paralytics:  None  Procedure details:     Preoxygenation:  Bag valve mask    CPR in progress: yes      Number of attempts:  1  Successful intubation attempt details:     Intubation method:  Oral    Intubation technique: video assisted      Laryngoscope blade:  Mac 4    Bougie used: no      Tube size (mm):  7.5    Tube type:  Cuffed    Tube visualized through cords: yes    Placement assessment:     ETT at teeth/gumline (cm):  23    Tube secured with:  ETT mai    Breath sounds:  Equal    Placement verification: chest rise, colorimetric  ETCO2, direct visualization and equal breath sounds    Post-procedure details:     Procedure completion:  Tolerated well, no immediate complications         Richie Dasilva MD  10/26/23 0728      Electronically signed by Richie Dasilva MD at 10/26/23 0728       Olamide Lipscomb, RN at 10/26/23 0130          Pt placed in clean, dry brief    Electronically signed by Olamide Lipscomb, RN at 10/26/23 0130       Olamide Lipscomb, RN at 10/26/23 0108          Nursing report ED to floor  Delvin Cardozo  63 y.o.  male    HPI :   Chief Complaint   Patient presents with    Vomiting       Admitting doctor:   Jeff Chavez MD    Admitting diagnosis:   There were no encounter diagnoses.    Code status:   Current Code Status       Date Active Code Status Order ID Comments User Context       Not on file            Allergies:   Silvadene [silver sulfadiazine]    Isolation:   No active isolations    Intake and Output    Intake/Output Summary (Last 24 hours) at 10/26/2023 0109  Last data filed at 10/26/2023 0106  Gross per 24 hour   Intake 50 ml   Output --   Net 50 ml       Weight:       10/25/23  2045   Weight: 80.7 kg (178 lb)       Most recent vitals:   Vitals:    10/25/23 2332 10/26/23 0001 10/26/23 0101 10/26/23 0102   BP:  126/71 103/57    Pulse:  104 100 100   Resp:       Temp:       TempSrc:       SpO2: 93%  95%    Weight:       Height:           Active LDAs/IV Access:   Lines, Drains & Airways       Active LDAs       Name Placement date Placement time Site Days    Peripheral IV 10/25/23 2140 Left;Posterior Hand 10/25/23  2140  Hand  less than 1                    Labs (abnormal labs have a star):   Labs Reviewed   LACTIC ACID, PLASMA - Abnormal; Notable for the following components:       Result Value    Lactate 3.3 (*)     All other components within normal limits   CBC WITH AUTO DIFFERENTIAL - Abnormal; Notable for the following components:    WBC 23.70 (*)     RBC 4.08 (*)     Hemoglobin 10.8 (*)     Hematocrit  35.1 (*)     MCH 26.5 (*)     MCHC 30.8 (*)     RDW 17.1 (*)     Neutrophil % 78.1 (*)     Lymphocyte % 12.6 (*)     Monocyte % 4.3 (*)     Immature Grans % 4.1 (*)     Neutrophils, Absolute 18.51 (*)     Monocytes, Absolute 1.01 (*)     Immature Grans, Absolute 0.97 (*)     nRBC 0.6 (*)     All other components within normal limits   RESPIRATORY PANEL PCR W/ COVID-19 (SARS-COV-2), NP SWAB IN UTM/VTP, 3-4 HR TAT - Normal    Narrative:     In the setting of a positive respiratory panel with a viral infection PLUS a negative procalcitonin without other underlying concern for bacterial infection, consider observing off antibiotics or discontinuation of antibiotics and continue supportive care. If the respiratory panel is positive for atypical bacterial infection (Bordetella pertussis, Chlamydophila pneumoniae, or Mycoplasma pneumoniae), consider antibiotic de-escalation to target atypical bacterial infection.   LIPASE - Normal   GASTROINTESTINAL PANEL, PCR (PREFERRED) DOES NOT INCLUDE CDIFF   BLOOD CULTURE   BLOOD CULTURE   COMPREHENSIVE METABOLIC PANEL   URINALYSIS W/ MICROSCOPIC IF INDICATED (NO CULTURE)   LACTIC ACID, REFLEX   LACTIC ACID, REFLEX   CBC AND DIFFERENTIAL    Narrative:     The following orders were created for panel order CBC & Differential.  Procedure                               Abnormality         Status                     ---------                               -----------         ------                     CBC Auto Differential[[id number]]        Abnormal            Final result                 Please view results for these tests on the individual orders.       EKG:   No orders to display       Meds given in ED:   Medications   sodium chloride 0.9 % flush 10 mL (has no administration in time range)   lactated ringers infusion (125 mL/hr Intravenous Currently Infusing 10/26/23 0010)   ondansetron (ZOFRAN) injection 4 mg (4 mg Intravenous Given 10/25/23 1526)   piperacillin-tazobactam (ZOSYN) 3.375  g in iso-osmotic dextrose 50 ml (premix) (0 g Intravenous Stopped 10/26/23 0106)       Imaging results:  CT Abdomen Pelvis Without Contrast    Result Date: 10/25/2023  1. Left chest wall masses. Given the other findings these are likely neoplastic. 2. Liver enlargement with multiple low-density liver lesions consistent with metastatic disease. 3. Cholelithiasis.  Radiation dose reduction techniques were utilized, including automated exposure control and exposure modulation based on body size.        Ambulatory status:   - bedrest    Social issues:   Social History     Socioeconomic History    Marital status:        NIH Stroke Scale:       Olamide Lipscomb RN  10/26/23 01:09 EDT         Electronically signed by Olamide Lipscomb RN at 10/26/23 0109       Olamide Perez PCT at 10/26/23 0032          Second blood culture drawn with 21g butterfly to R index finger from this EDT. Pt is a difficult stick and has been stuck multiple times from different people. Lab was unsuccessful for blood culture draw. Short draw noted to blue top culture, jace unsuccessful.    Electronically signed by Olamide Perez PCT at 10/26/23 0036       Michael Dowell MD at 10/25/23 2100        Procedure Orders    1. Critical Care [[id number]] ordered by Michael Dowell MD                  EMERGENCY DEPARTMENT ENCOUNTER    Room Number:  N635/1  Date seen:  10/26/2023  PCP: Adrien Garcia MD  Historian(s): Patient, paramedics, patient's sister      HPI:  Chief Complaint: Nausea, vomiting, diarrhea, abdominal pain, weakness  A complete HPI/ROS/PMH/PSH/SH/FH are unobtainable / limited due to: Patient somewhat hard of hearing  Context: eDlvin Cardozo is a 63 y.o. male who presents to the ED via EMS from nursing facility for evaluation of persistent nausea and vomiting and diarrhea with abdominal pain.  Patient has a history of multiple myeloma with metastatic disease.  He says for the past 3 days he has had persistent nausea and poor appetite.   Apparently today the nausea and vomiting have been increasingly worse.  He has had some loose stools at times.  There is been no blood, however.        PAST MEDICAL HISTORY  Active Ambulatory Problems     Diagnosis Date Noted    No Active Ambulatory Problems     Resolved Ambulatory Problems     Diagnosis Date Noted    No Resolved Ambulatory Problems     No Additional Past Medical History         PAST SURGICAL HISTORY  History reviewed. No pertinent surgical history.      FAMILY HISTORY  History reviewed. No pertinent family history.      SOCIAL HISTORY  Social History     Socioeconomic History    Marital status:          ALLERGIES  Silvadene [silver sulfadiazine]        REVIEW OF SYSTEMS  Review of Systems   Constitutional:  Positive for activity change, appetite change and fatigue. Negative for fever.   HENT: Negative.     Eyes:  Negative for pain and visual disturbance.   Respiratory:  Negative for cough and shortness of breath.    Cardiovascular:  Negative for chest pain.   Gastrointestinal:  Positive for abdominal pain, diarrhea, nausea and vomiting. Negative for blood in stool.   Genitourinary:  Positive for decreased urine volume. Negative for dysuria.   Skin:  Negative for color change.   Neurological:  Positive for weakness. Negative for syncope and headaches.   All other systems reviewed and are negative.           PHYSICAL EXAM  ED Triage Vitals [10/25/23 2045]   Temp Heart Rate Resp BP SpO2   97.8 °F (36.6 °C) 104 16 102/50 97 %      Temp src Heart Rate Source Patient Position BP Location FiO2 (%)   Tympanic -- -- -- --       Physical Exam      GENERAL: Somewhat ill-appearing, no diaphoresis, holding an emesis bag  HENT: nares patent, normocephalic and atraumatic, mucous membranes somewhat dry  EYES: no scleral icterus, EOMI, normal conjunctivae  CV: regular rhythm, normal rate around 100 bpm, normal distal pulses  RESPIRATORY: normal effort, no stridor, diminished at the bilateral bases.  No  wheezes or rales or rhonchi  ABDOMEN: soft, mild diffuse tenderness in all 4 quadrants equally.  Bowel sounds are hypoactive but present  MUSCULOSKELETAL: no deformity, no asymmetry  NEURO: alert, moves all extremities, follows commands, no facial droop  PSYCH:  calm, cooperative, judgment appears somewhat sluggish  SKIN: warm, dry    Vital signs and nursing notes reviewed.        LAB RESULTS  Recent Results (from the past 24 hour(s))   Lipase    Collection Time: 10/25/23  9:42 PM    Specimen: Hand, Left; Blood   Result Value Ref Range    Lipase 50 13 - 60 U/L   Lactic Acid, Plasma    Collection Time: 10/25/23  9:42 PM    Specimen: Hand, Left; Blood   Result Value Ref Range    Lactate 3.3 (C) 0.5 - 2.0 mmol/L   CBC Auto Differential    Collection Time: 10/25/23  9:42 PM    Specimen: Hand, Left; Blood   Result Value Ref Range    WBC 23.70 (H) 3.40 - 10.80 10*3/mm3    RBC 4.08 (L) 4.14 - 5.80 10*6/mm3    Hemoglobin 10.8 (L) 13.0 - 17.7 g/dL    Hematocrit 35.1 (L) 37.5 - 51.0 %    MCV 86.0 79.0 - 97.0 fL    MCH 26.5 (L) 26.6 - 33.0 pg    MCHC 30.8 (L) 31.5 - 35.7 g/dL    RDW 17.1 (H) 12.3 - 15.4 %    RDW-SD 52.5 37.0 - 54.0 fl    MPV 9.9 6.0 - 12.0 fL    Platelets 361 140 - 450 10*3/mm3    Neutrophil % 78.1 (H) 42.7 - 76.0 %    Lymphocyte % 12.6 (L) 19.6 - 45.3 %    Monocyte % 4.3 (L) 5.0 - 12.0 %    Eosinophil % 0.3 0.3 - 6.2 %    Basophil % 0.6 0.0 - 1.5 %    Immature Grans % 4.1 (H) 0.0 - 0.5 %    Neutrophils, Absolute 18.51 (H) 1.70 - 7.00 10*3/mm3    Lymphocytes, Absolute 2.99 0.70 - 3.10 10*3/mm3    Monocytes, Absolute 1.01 (H) 0.10 - 0.90 10*3/mm3    Eosinophils, Absolute 0.07 0.00 - 0.40 10*3/mm3    Basophils, Absolute 0.15 0.00 - 0.20 10*3/mm3    Immature Grans, Absolute 0.97 (H) 0.00 - 0.05 10*3/mm3    nRBC 0.6 (H) 0.0 - 0.2 /100 WBC   Respiratory Panel PCR w/COVID-19(SARS-CoV-2) EL/BARNEY/DYLAN/PAD/COR/MAD/NIGEL In-House, NP Swab in [facility], 3-4 HR TAT - Swab, Nasopharynx    Collection Time: 10/25/23 11:03 PM     Specimen: Nasopharynx; Swab   Result Value Ref Range    ADENOVIRUS, PCR Not Detected Not Detected    Coronavirus 229E Not Detected Not Detected    Coronavirus HKU1 Not Detected Not Detected    Coronavirus NL63 Not Detected Not Detected    Coronavirus OC43 Not Detected Not Detected    COVID19 Not Detected Not Detected - Ref. Range    Human Metapneumovirus Not Detected Not Detected    Human Rhinovirus/Enterovirus Not Detected Not Detected    Influenza A PCR Not Detected Not Detected    Influenza B PCR Not Detected Not Detected    Parainfluenza Virus 1 Not Detected Not Detected    Parainfluenza Virus 2 Not Detected Not Detected    Parainfluenza Virus 3 Not Detected Not Detected    Parainfluenza Virus 4 Not Detected Not Detected    RSV, PCR Not Detected Not Detected    Bordetella pertussis pcr Not Detected Not Detected    Bordetella parapertussis PCR Not Detected Not Detected    Chlamydophila pneumoniae PCR Not Detected Not Detected    Mycoplasma pneumo by PCR Not Detected Not Detected   Comprehensive Metabolic Panel    Collection Time: 10/26/23 12:24 AM    Specimen: Hand, Left; Blood   Result Value Ref Range    Glucose 101 (H) 65 - 99 mg/dL    BUN 56 (H) 8 - 23 mg/dL    Creatinine 5.51 (H) 0.76 - 1.27 mg/dL    Sodium 134 (L) 136 - 145 mmol/L    Potassium 5.5 (H) 3.5 - 5.2 mmol/L    Chloride 84 (L) 98 - 107 mmol/L    CO2 16.2 (L) 22.0 - 29.0 mmol/L    Calcium 9.8 8.6 - 10.5 mg/dL    Total Protein 6.5 6.0 - 8.5 g/dL    Albumin 3.0 (L) 3.5 - 5.2 g/dL    ALT (SGPT) 66 (H) 1 - 41 U/L    AST (SGOT) 166 (H) 1 - 40 U/L    Alkaline Phosphatase 1,012 (H) 39 - 117 U/L    Total Bilirubin 2.1 (H) 0.0 - 1.2 mg/dL    Globulin 3.5 gm/dL    A/G Ratio 0.9 g/dL    BUN/Creatinine Ratio 10.2 7.0 - 25.0    Anion Gap 33.8 (H) 5.0 - 15.0 mmol/L    eGFR 11.0 (L) >60.0 mL/min/1.73   STAT Lactic Acid, Reflex    Collection Time: 10/26/23 12:30 AM    Specimen: Blood   Result Value Ref Range    Lactate 2.7 (C) 0.5 - 2.0 mmol/L       Ordered the  above labs and reviewed the results.        RADIOLOGY  CT Abdomen Pelvis Without Contrast    Result Date: 10/25/2023  CT of the abdomen and pelvis without contrast 10/25/2023  HISTORY: Abdominal pain. Vomiting. HISTORY of metastatic disease  Spiral images were obtained from the lung bases to the symphysis pubis. No intravenous or oral contrast was given.  There are at least 3 subcutaneous masses in the left chest wall largest measuring 3.4 cm.  The liver is enlarged and there are innumerable low-density lesions likely metastatic. At least 3-4 calcified gallstones are seen.  The spleen, pancreas, adrenals and kidneys appear unremarkable.  No bowel wall thickening or bowel dilatation is seen. Prostate gland calcifications are seen. Urinary bladder is unremarkable. There is mild compression deformity of the L4 vertebrae which does not appear acute. There are some areas of sclerosis along the endplates of several vertebrae which may be related to Schmorl's nodes. These do not appear to represent metastatic lesions.      1. Left chest wall masses. Given the other findings these are likely neoplastic. 2. Liver enlargement with multiple low-density liver lesions consistent with metastatic disease. 3. Cholelithiasis.  Radiation dose reduction techniques were utilized, including automated exposure control and exposure modulation based on body size.        Ordered the above noted radiological studies. Reviewed by me in PACS.          PROCEDURES  Critical Care    Performed by: Michael Dowell MD  Authorized by: Michael Dowell MD    Critical care provider statement:     Critical care time (minutes):  35    Critical care time was exclusive of:  Separately billable procedures and treating other patients and teaching time    Critical care was necessary to treat or prevent imminent or life-threatening deterioration of the following conditions:  Sepsis and dehydration    Critical care was time spent personally by me on the  following activities:  Development of treatment plan with patient or surrogate, discussions with consultants, evaluation of patient's response to treatment, examination of patient, interpretation of cardiac output measurements, obtaining history from patient or surrogate, ordering and performing treatments and interventions, ordering and review of laboratory studies, ordering and review of radiographic studies, pulse oximetry, re-evaluation of patient's condition and review of old charts    I assumed direction of critical care for this patient from another provider in my specialty: no      Care discussed with: admitting provider            MEDICATIONS GIVEN IN ER  Medications   sodium chloride 0.9 % flush 10 mL (has no administration in time range)   lactated ringers infusion (125 mL/hr Intravenous Currently Infusing 10/26/23 0010)   sodium chloride 0.9 % flush 10 mL (has no administration in time range)   sodium chloride 0.9 % flush 10 mL (has no administration in time range)   sodium chloride 0.9 % infusion 40 mL (has no administration in time range)   nitroglycerin (NITROSTAT) SL tablet 0.4 mg (has no administration in time range)   sodium chloride 0.9 % infusion (has no administration in time range)   ondansetron (ZOFRAN) tablet 4 mg (has no administration in time range)     Or   ondansetron (ZOFRAN) injection 4 mg (has no administration in time range)   calcium carbonate (TUMS) chewable tablet 500 mg (200 mg elemental) (has no administration in time range)   Pharmacy to Dose Zosyn (has no administration in time range)   dextrose (GLUTOSE) oral gel 15 g (has no administration in time range)   dextrose (D50W) (25 g/50 mL) IV injection 25 g (has no administration in time range)   glucagon (GLUCAGEN) injection 1 mg (has no administration in time range)   insulin lispro (HUMALOG/ADMELOG) injection 2-7 Units (has no administration in time range)   sodium chloride 0.9 % bolus 500 mL (has no administration in time  range)   piperacillin-tazobactam (ZOSYN) 3.375 g in iso-osmotic dextrose 50 ml (premix) (has no administration in time range)   ondansetron (ZOFRAN) injection 4 mg (4 mg Intravenous Given 10/25/23 2147)   piperacillin-tazobactam (ZOSYN) 3.375 g in iso-osmotic dextrose 50 ml (premix) (0 g Intravenous Stopped 10/26/23 0106)         MEDICAL DECISION MAKING, PROGRESS, and CONSULTS    All labs have been independently reviewed by me.  All radiology studies have been reviewed by me and I have also reviewed the radiology report.   EKG's independently viewed and interpreted by me.  Discussion below represents my analysis of pertinent findings related to patient's condition, differential diagnosis, treatment plan and final disposition.      Additional sources:  - Discussed/ obtained information from independent historians:  I discussed with paramedics to receive report of patient's condition on arrival and treatments initiated in route to the hospital.    - External (non-ED) record review: I reviewed the recent hospital discharge summary from October 20, 2023.  He had been admitted initially to the ICU for concerns of septic shock and BURAK.    - Chronic or social conditions impacting care: Patient with multiple medical problems including metastatic cancer, recently discharged from this facility within the past week and had been recovering at skilled nursing facility for a few days before this setback.      Additional orders considered but not ordered:  I considered ordering ultrasound the gallbladder but he had just recently had an right upper quadrant ultrasound study performed within the past week for his past hospital admission.  I will defer further radiology imaging test to be ordered by admitting service at this time.    Orders placed during this visit:  Orders Placed This Encounter   Procedures    Critical Care    Gastrointestinal Panel, PCR - Stool, Per Rectum    Respiratory Panel PCR w/COVID-19(SARS-CoV-2)  EL/BARNEY/DYLAN/PAD/COR/MAD/NIGEL In-House, NP Swab in UTM/VTM, 3-4 HR TAT - Swab, Nasopharynx    Blood Culture - Blood,    Blood Culture - Blood,    CT Abdomen Pelvis Without Contrast    Comprehensive Metabolic Panel    Lipase    Urinalysis With Microscopic If Indicated (No Culture) - Urine, Clean Catch    Lactic Acid, Plasma    CBC Auto Differential    STAT Lactic Acid, Reflex    STAT Lactic Acid, Reflex    CBC (No Diff)    Comprehensive Metabolic Panel    Diet: Diabetic Diets; Consistent Carbohydrate; Texture: Regular Texture (IDDSI 7); Fluid Consistency: Thin (IDDSI 0)    Monitor Blood Pressure    Pulse Oximetry, Continuous    Vital Signs    Intake & Output    Weigh Patient    Oral Care    Place Sequential Compression Device    Maintain Sequential Compression Device    Telemetry - Maintain IV Access    Telemetry - Place Orders & Notify Provider of Results When Patient Experiences Acute Chest Pain, Dysrhythmia or Respiratory Distress    May Be Off Telemetry for Tests    Code Status and Medical Interventions:    LHA (on-call MD unless specified) Details    POC Glucose 4x Daily Before Meals & at Bedtime    Insert Peripheral IV    Insert Peripheral IV    Inpatient Admission    CBC & Differential       Differential diagnosis includes but is not limited to:    Sepsis, bowel obstruction, diverticulitis, dehydration, UTI, viral enteritis      Independent interpretation of labs, radiology studies, and discussions with consultants:  ED Course as of 10/26/23 0139   Wed Oct 25, 2023   2109 Patient appears somewhat ill here.  Persistent nausea and vomiting raise suspicion for possible bowel obstruction especially with his known cancer history.  We will start some IV fluids and antiemetics and order CT scan of the abdomen along with basic labs to check his electrolytes and renal function. [ELOISE]   2234 WBC(!): 23.70 [ELOISE]   2234 Lactate(!!): 3.3 [ELOISE]   Thu Oct 26, 2023   0102 Given patient's elevated white blood cell count, elevated  lactic acid and tachycardia, he is triggering our sepsis protocol alarm and therefore I am going blood cultures and ordering a dose of IV Zosyn. [ELOISE]   0102 I independently interpreted the abdomen CT study and my findings are: No free air, no small bowel obstruction pattern.   [ELOISE]   0102 Respiratory viral panel is unremarkable.  Work of breathing remains normal and saturations are satisfactory on room air. [ELOISE]   0102 It seems that patient's nausea and vomiting have improved a lot now.  He is asking for something to drink, specifically some ginger ale.  We will allow him to take some sips of fluids to see if he can hold him down. [ELOISE]   0103 I discussed with Sally from [facility] about the patient.  She agrees to accept him to the hospital service on behalf of Dr. Chavez [ELOISE]   0103 Hemoglobin(!): 10.8 [ELOISE]   0104 Hematocrit(!): 35.1 [ELOISE]   0126 Creatinine(!): 5.51 [ELOISE]   0126 Alkaline Phosphatase(!): 1,012 [ELOISE]   0126 Total Bilirubin(!): 2.1 [ELOISE]   0126 Elevated LFTs are noted no as well as elevated creatinine.  Cholelithiasis was noted on the CT report from abdomen study earlier today.  Patient may benefit from ultrasound gallbladder study and GI consult.  I will page Sally from [facility] just to give her an update on this lab finding now that it has published. [ELOISE]      ED Course User Index  [ELOISE] Michael Dowell MD         DIAGNOSIS  Final diagnoses:   Sepsis with acute renal failure without septic shock, due to unspecified organism, unspecified acute renal failure type   Nausea and vomiting, unspecified vomiting type   Biliary calculus of other site with obstruction   Hyperkalemia         DISPOSITION  Admit to [facility], telemetry        Latest Documented Vital Signs:  As of 01:39 EDT  BP- 103/57 HR- 100 Temp- 97.8 °F (36.6 °C) (Tympanic) O2 sat- 95%              --    Please note that portions of this were completed with a voice recognition program.       Note Disclaimer: At [facility], we believe that sharing  information builds trust and better relationships. You are receiving this note because you are receiving care at [facility] or recently visited. It is possible you will see health information before a provider has talked with you about it. This kind of information can be easy to misunderstand. To help you fully understand what it means for your health, we urge you to discuss this note with your provider.             Michael Dowell MD  10/26/23 0139      Electronically signed by Michael Dowell MD at 10/26/23 0139       Dee Dee Chen, RN at 10/25/23 2043          To ER via EMS from [facility]  c/o N/V all day.      Pt has hx of multiple myeloma with mets to liver, bile duct, and spleen.      Pt received Zofran PO at 1800 without relief.      22ga SL in rt upper arm per facility.  Facility has NS @ 100cc/hr running.  D/c's on EMS arrival.      Electronically signed by Dee Dee Chen RN at 10/25/23 2048       Vital Signs (last 2 days)       Date/Time Temp Temp src Pulse Resp BP Patient Position SpO2    10/26/23 0715 -- -- 132 -- -- -- --    10/26/23 07:14:15 -- -- 156 -- -- -- --    10/26/23 07:12:27 -- -- 0 -- -- -- --    10/26/23 07:12:05 -- -- 0  -- -- -- --    Pulse: pt asystole on monitor at 10/26/23 0712    10/26/23 07:10:52 -- -- 0 -- -- -- --    10/26/23 07:08:55 -- -- 0 -- -- -- --    10/26/23 07:08:02 -- -- 0  -- -- -- --    Pulse: pt pulseless, CPR restarted at 10/26/23 0708    10/26/23 0706 -- -- 80 -- 86/50 -- --    10/26/23 07:04:02 -- -- 123 -- -- -- --    10/26/23 07:00:05 -- -- 0  -- -- -- --    Pulse: pt asystole on monitor at 10/26/23 0700    10/26/23 06:58:41 -- -- 0 -- -- -- --    10/26/23 06:57:05 -- -- 0  -- -- -- --    Pulse: pt asystole on monitor at 10/26/23 0657    10/26/23 06:54:05 -- -- 0  -- -- -- --    Pulse: pt asystole on monitor at 10/26/23 0654    10/26/23 06:50:10 -- -- 0  -- -- -- --    Pulse: Pt asystole on monitor. at 10/26/23 0650    10/26/23 0503 -- -- -- -- 98/62  Lying 99    10/26/23 0436 -- -- 102 16 83/45 Lying 99    10/26/23 0146 98.4 (36.9) Oral 98 16 95/54 Lying 94    10/26/23 0102 -- -- 100 -- -- -- --    10/26/23 0101 -- -- 100 -- 103/57 -- 95    10/26/23 00:54:05 -- -- 0  -- -- -- --    Pulse: pt asystole on monitor at 10/26/23 0054    10/26/23 0001 -- -- 104 -- 126/71 -- --    10/25/23 2332 -- -- -- -- -- -- 93    10/25/23 2331 -- -- 101 -- 125/68 -- --    10/25/23 2240 -- -- 97 -- -- -- 93    10/25/23 2231 -- -- 100 -- 135/77 -- 92    10/25/23 2143 -- -- 108 -- -- -- 94    10/25/23 2045 97.8 (36.6) Tympanic 104 16 102/50 -- 97          Oxygen Therapy (last 2 days)       Date/Time SpO2 Device (Oxygen Therapy) Flow (L/min) Oxygen Concentration (%) ETCO2 (mmHg)    10/26/23 0715 -- -- -- -- 18    10/26/23 07:14:15 -- -- -- -- 23    10/26/23 07:12:27 -- -- -- -- 24    10/26/23 07:10:52 -- -- -- -- 29    10/26/23 07:08:55 -- -- -- -- 7    10/26/23 0706 -- -- -- -- 19    10/26/23 06:58:41 -- -- -- -- 11    10/26/23 06:50:10 -- nonrebreather mask -- -- --    10/26/23 0503 99 room air -- -- --    10/26/23 0436 99 room air -- -- --    10/26/23 0215 -- room air -- -- --    10/26/23 0146 94 room air -- -- --    10/26/23 0101 95 room air -- -- --    10/25/23 2332 93 -- -- -- --    10/25/23 2240 93 -- -- -- --    10/25/23 2231 92 -- -- -- --    10/25/23 2143 94 -- -- -- --    10/25/23 2045 97 room air -- -- --          Intake & Output (last 2 days)         10/24 0701  10/25 0700 10/25 0701  10/26 0700 10/26 0701  10/27 0700    IV Piggyback  50     Total Intake(mL/kg)  50 (0.7)     Net  +50                  Lines, Drains & Airways       Active LDAs       None                  Medication Administration Report for Delvin Cardozo as of 10/26/23 0946     Legend:    Given Hold Not Given Due Canceled Entry Other Actions    Time Time (Time) Time Time-Action         Discontinued     Completed     Future     MAR Hold     Linked             Medications 10/25/23 10/26/23      calcium  carbonate (TUMS) chewable tablet 500 mg (200 mg elemental)  Dose: 2 tablet  Freq: 2 Times Daily PRN Route: PO  PRN Reason: Heartburn  Start: 10/26/23 0116   Admin Instructions:   One tablet contains 200 mg elemental calcium.  Take with food.         dextrose (D50W) (25 g/50 mL) IV injection 25 g  Dose: 25 g  Freq: Every 15 Minutes PRN Route: IV  PRN Reason: Low Blood Sugar  PRN Comment: Blood Sugar Less Than 70  Start: 10/26/23 0121   Admin Instructions:   Blood sugar less than 70; patient has IV access - Unresponsive, NPO or Unable To Safely Swallow         dextrose (GLUTOSE) oral gel 15 g  Dose: 15 g  Freq: Every 15 Minutes PRN Route: PO  PRN Reason: Low Blood Sugar  PRN Comment: Blood sugar less than 70  Start: 10/26/23 0121   Admin Instructions:   BS<70, Patient Alert, Is not NPO, Can safely swallow.         glucagon (GLUCAGEN) injection 1 mg  Dose: 1 mg  Freq: Every 15 Minutes PRN Route: IM  PRN Reason: Low Blood Sugar  PRN Comment: Blood Glucose Less Than 70  Start: 10/26/23 0121   Admin Instructions:   Blood Glucose Less Than 70 - Patient Without IV Access - Unresponsive, NPO or Unable To Safely Swallow  Reconstitute powder for injection by adding 1 mL of -supplied sterile diluent or sterile water for injection to a vial containing 1 mg of the drug, to provide solutions containing 1 mg/mL. Shake vial gently to dissolve.         insulin lispro (HUMALOG/ADMELOG) injection 2-7 Units  Dose: 2-7 Units  Freq: 4 Times Daily Before Meals & Nightly Route: SC  Start: 10/26/23 0730   Admin Instructions:   Correction Insulin - Low Dose - Total Insulin Dose Less Than 40 units/day (Lean, Elderly or Renal Patients)    Blood Glucose 150-199 mg/dL - 2 units  Blood Glucose 200-249 mg/dL - 3 units  Blood Glucose 250-299 mg/dL - 4 units  Blood Glucose 300-349 mg/dL - 5 units  Blood Glucose 350-400 mg/dL - 6 units  Blood Glucose Greater Than 400 mg/dL - 7 units & Call Provider     Caution: Look alike/sound alike drug  alert       (0632)-Not Given [C]     1130     1730     2100            lactated ringers infusion  Rate: 125 mL/hr Dose: 125 mL/hr  Freq: Continuous Route: IV  Start: 10/25/23 2115    2144-New Bag            0010-Currently Infusing               nitroglycerin (NITROSTAT) SL tablet 0.4 mg  Dose: 0.4 mg  Freq: Every 5 Minutes PRN Route: SL  PRN Reason: Chest Pain  PRN Comment: Only if SBP Greater Than 100  Start: 10/26/23 0116   Admin Instructions:   If Pain Unrelieved After 3 Doses Notify MD  May administer up to 3 doses per episode.         ondansetron (ZOFRAN) tablet 4 mg  Dose: 4 mg  Freq: Every 6 Hours PRN Route: PO  PRN Reasons: Nausea,Vomiting  Start: 10/26/23 0116   Admin Instructions:   If BOTH ondansetron (ZOFRAN) and promethazine (PHENERGAN) are ordered use ondansetron first and THEN promethazine IF ondansetron is ineffective.        Or  ondansetron (ZOFRAN) injection 4 mg  Dose: 4 mg  Freq: Every 6 Hours PRN Route: IV  PRN Reasons: Nausea,Vomiting  Start: 10/26/23 0116   Admin Instructions:   If BOTH ondansetron (ZOFRAN) and promethazine (PHENERGAN) are ordered use ondansetron first and THEN promethazine IF ondansetron is ineffective.         piperacillin-tazobactam (ZOSYN) 3.375 g in iso-osmotic dextrose 50 ml (premix)  Dose: 3.375 g  Freq: Every 12 Hours Route: IV  Indications of Use: SEPSIS  Start: 10/26/23 0830   End: 10/31/23 0829   Admin Instructions:   Refrigerate     0830     2030              sodium chloride 0.9 % bolus 500 mL  Dose: 500 mL  Freq: Once Route: IV  Start: 10/26/23 0137     (0431)-Not Given [C]               sodium chloride 0.9 % flush 10 mL  Dose: 10 mL  Freq: As Needed Route: IV  PRN Reason: Line Care  Start: 10/26/23 0116         sodium chloride 0.9 % flush 10 mL  Dose: 10 mL  Freq: Every 12 Hours Scheduled Route: IV  Start: 10/26/23 0137     0215-Given     0900     2100             sodium chloride 0.9 % flush 10 mL  Dose: 10 mL  Freq: As Needed Route: IV  PRN Reason: Line  "Care  Start: 10/25/23 2059         sodium chloride 0.9 % infusion  Rate: 100 mL/hr Dose: 100 mL/hr  Freq: Continuous Route: IV  Start: 10/26/23 0137     0432-New Bag               sodium chloride 0.9 % infusion 40 mL  Dose: 40 mL  Freq: As Needed Route: IV  PRN Reason: Line Care  Start: 10/26/23 0116   Admin Instructions:   Following administration of an IV intermittent medication, flush line with 40mL NS at 100mL/hr.        Completed Medications  Medications 10/25/23 10/26/23       amiodarone (CORDARONE) injection  Freq: Code / Trauma / Sedation Medication Route: IV  Start: 10/26/23 0706   End: 10/26/23 0706     0706-Given               EPINEPHrine (ADRENALIN) injection  Freq: Code / Trauma / Sedation Medication Route: IV  Start: 10/26/23 0052   End: 10/26/23 0712     0052-Given     0652-Given     0655-Given     0700-Given     0709-Given       0712-Given               ondansetron (ZOFRAN) injection 4 mg  Dose: 4 mg  Freq: Once Route: IV  Start: 10/25/23 2115   End: 10/25/23 2147   Admin Instructions:   \"If multiple N/V medications ordered, use in the following order: Ondansetron, Prochlorperazine, Promethazine. Use PO unless patient refuses or patient unable to swallow.\"    2147-Given                piperacillin-tazobactam (ZOSYN) 3.375 g in iso-osmotic dextrose 50 ml (premix)  Dose: 3.375 g  Freq: Once Route: IV  Indications of Use: SEPSIS  Start: 10/25/23 2354   End: 10/26/23 0106   Admin Instructions:   Refrigerate     0035-New Bag [C]     0106-Stopped              sodium bicarbonate injection 8.4%  Freq: Code / Trauma / Sedation Medication Route: IV  Start: 10/26/23 0658   End: 10/26/23 0658     0658-Given              Discontinued Medications  Medications 10/25/23 10/26/23       Pharmacy to Dose Zosyn  Freq: Continuous PRN Route: XX  PRN Reason: Consult  Indications of Use: SEPSIS  Start: 10/26/23 0120   End: 10/26/23 0740          Lab Results (all)       Procedure Component Value Units Date/Time    STAT " Lactic Acid, Reflex [[id number]]  (Abnormal) Collected: 10/26/23 0639    Specimen: Blood Updated: 10/26/23 0721     Lactate 10.8 mmol/L     CBC (No Diff) [[id number]]  (Abnormal) Collected: 10/26/23 0639    Specimen: Blood Updated: 10/26/23 0703     WBC 24.62 10*3/mm3      RBC 3.74 10*6/mm3      Hemoglobin 10.2 g/dL      Hematocrit 32.6 %      MCV 87.2 fL      MCH 27.3 pg      MCHC 31.3 g/dL      RDW 16.6 %      RDW-SD 51.8 fl      MPV 10.9 fL      Platelets 518 10*3/mm3     POC Glucose Once [[id number]]  (Normal) Collected: 10/26/23 0629    Specimen: Blood Updated: 10/26/23 0631     Glucose 102 mg/dL     Gastrointestinal Panel, PCR - Stool, Per Rectum [[id number]]  (Abnormal) Collected: 10/26/23 0003    Specimen: Stool from Per Rectum Updated: 10/26/23 0149     Campylobacter Not Detected     Plesiomonas shigelloides Not Detected     Salmonella Not Detected     Vibrio Not Detected     Vibrio cholerae Not Detected     Yersinia enterocolitica Not Detected     Enteroaggregative E. coli (EAEC) Not Detected     Enteropathogenic E. coli (EPEC) Not Detected     Enterotoxigenic E. coli (ETEC) lt/st Not Detected     Shiga-like toxin-producing E. coli (STEC) stx1/stx2 Not Detected     Shigella/Enteroinvasive E. coli (EIEC) Not Detected     Cryptosporidium Not Detected     Cyclospora cayetanensis Not Detected     Entamoeba histolytica Not Detected     Giardia lamblia Not Detected     Adenovirus F40/41 Not Detected     Astrovirus Not Detected     Norovirus GI/GII Detected     Rotavirus A Not Detected     Sapovirus (I, II, IV or V) Not Detected    STAT Lactic Acid, Reflex [[id number]]  (Abnormal) Collected: 10/26/23 0030    Specimen: Blood Updated: 10/26/23 0139     Lactate 2.7 mmol/L     Comprehensive Metabolic Panel [[id number]]  (Abnormal) Collected: 10/26/23 0024    Specimen: Blood from Hand, Left Updated: 10/26/23 0114     Glucose 101 mg/dL      BUN 56 mg/dL      Creatinine 5.51 mg/dL      Sodium 134 mmol/L      Potassium 5.5  mmol/L      Chloride 84 mmol/L      CO2 16.2 mmol/L      Calcium 9.8 mg/dL      Total Protein 6.5 g/dL      Albumin 3.0 g/dL      ALT (SGPT) 66 U/L      AST (SGOT) 166 U/L      Alkaline Phosphatase 1,012 U/L      Total Bilirubin 2.1 mg/dL      Globulin 3.5 gm/dL      A/G Ratio 0.9 g/dL      BUN/Creatinine Ratio 10.2     Anion Gap 33.8 mmol/L      eGFR 11.0 mL/min/1.73      Comment: <15 Indicative of kidney failure       Narrative:      GFR Normal >60  Chronic Kidney Disease <60  Kidney Failure <15      Blood Culture - Blood, Arm, Left [[id number]] Collected: 10/26/23 0024    Specimen: Blood from Arm, Left Updated: 10/26/23 0041    Blood Culture - Blood, Arm, Left [[id number]] Collected: 10/26/23 0031    Specimen: Blood from Arm, Left Updated: 10/26/23 0040    Respiratory Panel PCR w/COVID-19(SARS-CoV-2) EL/BARNEY/DYLAN/PAD/COR/MAD/NIGEL In-House, NP Swab in UTM/VTM, 3-4 HR TAT - Swab, Nasopharynx [[id number]]  (Normal) Collected: 10/25/23 2303    Specimen: Swab from Nasopharynx Updated: 10/26/23 0006     ADENOVIRUS, PCR Not Detected     Coronavirus 229E Not Detected     Coronavirus HKU1 Not Detected     Coronavirus NL63 Not Detected     Coronavirus OC43 Not Detected     COVID19 Not Detected     Human Metapneumovirus Not Detected     Human Rhinovirus/Enterovirus Not Detected     Influenza A PCR Not Detected     Influenza B PCR Not Detected     Parainfluenza Virus 1 Not Detected     Parainfluenza Virus 2 Not Detected     Parainfluenza Virus 3 Not Detected     Parainfluenza Virus 4 Not Detected     RSV, PCR Not Detected     Bordetella pertussis pcr Not Detected     Bordetella parapertussis PCR Not Detected     Chlamydophila pneumoniae PCR Not Detected     Mycoplasma pneumo by PCR Not Detected    Narrative:      In the setting of a positive respiratory panel with a viral infection PLUS a negative procalcitonin without other underlying concern for bacterial infection, consider observing off antibiotics or discontinuation of  antibiotics and continue supportive care. If the respiratory panel is positive for atypical bacterial infection (Bordetella pertussis, Chlamydophila pneumoniae, or Mycoplasma pneumoniae), consider antibiotic de-escalation to target atypical bacterial infection.    Lactic Acid, Plasma [[id number]]  (Abnormal) Collected: 10/25/23 2142    Specimen: Blood from Hand, Left Updated: 10/25/23 2231     Lactate 3.3 mmol/L     Lipase [[id number]]  (Normal) Collected: 10/25/23 2142    Specimen: Blood from Hand, Left Updated: 10/25/23 2223     Lipase 50 U/L     CBC & Differential [[id number]]  (Abnormal) Collected: 10/25/23 2142    Specimen: Blood from Hand, Left Updated: 10/25/23 2208    Narrative:      The following orders were created for panel order CBC & Differential.  Procedure                               Abnormality         Status                     ---------                               -----------         ------                     CBC Auto Differential[[id number]]        Abnormal            Final result                 Please view results for these tests on the individual orders.    CBC Auto Differential [[id number]]  (Abnormal) Collected: 10/25/23 2142    Specimen: Blood from Hand, Left Updated: 10/25/23 2208     WBC 23.70 10*3/mm3      RBC 4.08 10*6/mm3      Hemoglobin 10.8 g/dL      Hematocrit 35.1 %      MCV 86.0 fL      MCH 26.5 pg      MCHC 30.8 g/dL      RDW 17.1 %      RDW-SD 52.5 fl      MPV 9.9 fL      Platelets 361 10*3/mm3      Neutrophil % 78.1 %      Lymphocyte % 12.6 %      Monocyte % 4.3 %      Eosinophil % 0.3 %      Basophil % 0.6 %      Immature Grans % 4.1 %      Neutrophils, Absolute 18.51 10*3/mm3      Lymphocytes, Absolute 2.99 10*3/mm3      Monocytes, Absolute 1.01 10*3/mm3      Eosinophils, Absolute 0.07 10*3/mm3      Basophils, Absolute 0.15 10*3/mm3      Immature Grans, Absolute 0.97 10*3/mm3      nRBC 0.6 /100 WBC           Imaging Results (All)       Procedure Component Value Units Date/Time     CT Abdomen Pelvis Without Contrast [[id number]] Collected: 10/25/23 2300     Updated: 10/25/23 2300    Narrative:      CT of the abdomen and pelvis without contrast 10/25/2023     HISTORY: Abdominal pain. Vomiting. HISTORY of metastatic disease     Spiral images were obtained from the lung bases to the symphysis pubis.  No intravenous or oral contrast was given.     There are at least 3 subcutaneous masses in the left chest wall largest  measuring 3.4 cm.     The liver is enlarged and there are innumerable low-density lesions  likely metastatic. At least 3-4 calcified gallstones are seen.     The spleen, pancreas, adrenals and kidneys appear unremarkable.     No bowel wall thickening or bowel dilatation is seen. Prostate gland  calcifications are seen. Urinary bladder is unremarkable. There is mild  compression deformity of the L4 vertebrae which does not appear acute.  There are some areas of sclerosis along the endplates of several  vertebrae which may be related to Schmorl's nodes. These do not appear  to represent metastatic lesions.       Impression:      1. Left chest wall masses. Given the other findings these are likely  neoplastic.  2. Liver enlargement with multiple low-density liver lesions consistent  with metastatic disease.  3. Cholelithiasis.     Radiation dose reduction techniques were utilized, including automated  exposure control and exposure modulation based on body size.                ECG/EMG Results (all)       Procedure Component Value Units Date/Time    SCANNED - TELEMETRY   [[id number]] Resulted: 10/25/23     Updated: 10/26/23 0912          Physician Progress Notes (all)    No notes of this type exist for this encounter.       Estiven Meyer, RN   Registered Nurse  Cardiology     Nursing Note     Signed     Date of Service: 10/26/23 0823  Creation Time: 10/26/23 0823     Signed         0641: Pt found unresponsive upon entering the room. Pt still has a pulse @ this time. Rapid response called.       0645: Pt heart rate now 0. No pulse detected. Code blue started. Family notified and on their way.      0718: Code blue called. Pt . Post mortem care initiated.

## 2023-10-26 NOTE — CASE MANAGEMENT/SOCIAL WORK
Case Management Discharge Note      Final Note: pt          Selected Continued Care - Admitted Since 10/25/2023       Destination    No services have been selected for the patient.                Durable Medical Equipment    No services have been selected for the patient.                Dialysis/Infusion    No services have been selected for the patient.                Home Medical Care    No services have been selected for the patient.                Therapy    No services have been selected for the patient.                Community Resources    No services have been selected for the patient.                Community & DME    No services have been selected for the patient.                         Final Discharge Disposition Code: 20 -

## 2023-10-26 NOTE — NURSING NOTE
0641: Pt found unresponsive upon entering the room. Pt still has a pulse @ this time. Rapid response called.     0645: Pt heart rate now 0. No pulse detected. Code blue started. Family notified and on their way.     0718: Code blue called. Pt . Post mortem care initiated.

## 2023-10-30 NOTE — DISCHARGE SUMMARY
ILENE    This was a 63-year-old male with history of multiple myeloma and metastatic disease who presented to the emergency room with complaints of nausea, vomiting, and diarrhea along with abdominal pain.  CT scan of the abdomen/pelvis in the ER showed cholelithiasis and changes consistent with his known history of metastatic disease but no other acute findings.  Please refer to ER documentation for full details.  I personally did not have any involvement in this patient's care but have been asked to complete this discharge summary given the fact that he was admitted under my name.  He was started on Zosyn and the hospital service was asked to admit him for further care.  He was accepted by the on-call nurse practitioner, Sally Emery.  Upon arrival to the floor, nursing notes indicated that he was awake and oriented x4 with no complaints of pain or discomfort.  Shortly thereafter he was found to be unresponsive and asystolic.  A CODE BLUE was initiated and CPR was begun.  He  at 7:18 AM.    Jeff Chavez MD

## 2023-10-31 LAB
BACTERIA SPEC AEROBE CULT: NORMAL
BACTERIA SPEC AEROBE CULT: NORMAL